# Patient Record
Sex: FEMALE | Race: WHITE | Employment: OTHER | URBAN - METROPOLITAN AREA
[De-identification: names, ages, dates, MRNs, and addresses within clinical notes are randomized per-mention and may not be internally consistent; named-entity substitution may affect disease eponyms.]

---

## 2017-02-23 ENCOUNTER — HOSPITAL ENCOUNTER (OUTPATIENT)
Dept: RADIATION ONCOLOGY | Age: 37
Discharge: HOME | End: 2017-02-23
Payer: COMMERCIAL

## 2017-02-23 PROCEDURE — 99211 OFF/OP EST MAY X REQ PHY/QHP: CPT | Performed by: OBSTETRICS & GYNECOLOGY

## 2017-07-13 ENCOUNTER — HOSPITAL ENCOUNTER (OUTPATIENT)
Dept: RADIATION ONCOLOGY | Age: 37
Discharge: HOME | End: 2017-07-13
Payer: COMMERCIAL

## 2017-07-13 PROCEDURE — 99211 OFF/OP EST MAY X REQ PHY/QHP: CPT | Performed by: OBSTETRICS & GYNECOLOGY

## 2018-05-10 ENCOUNTER — HOSPITAL ENCOUNTER (OUTPATIENT)
Dept: RADIATION ONCOLOGY | Age: 38
Discharge: HOME | End: 2018-05-10
Payer: COMMERCIAL

## 2018-05-10 PROCEDURE — 99211 OFF/OP EST MAY X REQ PHY/QHP: CPT | Performed by: OBSTETRICS & GYNECOLOGY

## 2019-05-24 ENCOUNTER — APPOINTMENT (OUTPATIENT)
Dept: GENERAL RADIOLOGY | Age: 39
End: 2019-05-24
Payer: COMMERCIAL

## 2019-05-24 ENCOUNTER — HOSPITAL ENCOUNTER (OUTPATIENT)
Age: 39
Setting detail: OBSERVATION
Discharge: HOME | End: 2019-05-26
Attending: EMERGENCY MEDICINE | Admitting: INTERNAL MEDICINE
Payer: COMMERCIAL

## 2019-05-24 DIAGNOSIS — J98.4 CAVITATING MASS IN RIGHT MIDDLE LUNG LOBE: Primary | ICD-10-CM

## 2019-05-24 PROCEDURE — 83605 ASSAY OF LACTIC ACID: CPT

## 2019-05-24 PROCEDURE — 71260 CT THORAX DX C+: CPT

## 2019-05-24 PROCEDURE — 80053 COMPREHEN METABOLIC PANEL: CPT

## 2019-05-24 PROCEDURE — 71046 X-RAY EXAM CHEST 2 VIEWS: CPT

## 2019-05-24 PROCEDURE — 36415 COLL VENOUS BLD VENIPUNCTURE: CPT

## 2019-05-24 PROCEDURE — 85025 COMPLETE CBC W/AUTO DIFF WBC: CPT

## 2019-05-24 PROCEDURE — 99285 EMERGENCY DEPT VISIT HI MDM: CPT

## 2019-05-24 RX ORDER — CITALOPRAM 10 MG/1
10 TABLET ORAL NIGHTLY
COMMUNITY

## 2019-05-24 SDOH — HEALTH STABILITY: MENTAL HEALTH: HOW OFTEN DO YOU HAVE A DRINK CONTAINING ALCOHOL?: NEVER

## 2019-05-24 ASSESSMENT — ENCOUNTER SYMPTOMS
EYE DISCHARGE: 0
WHEEZING: 0
BACK PAIN: 0
NAUSEA: 0
SHORTNESS OF BREATH: 1
CHEST TIGHTNESS: 0
COUGH: 1
PHOTOPHOBIA: 0
VOMITING: 0
ABDOMINAL PAIN: 0
ANAL BLEEDING: 0
SORE THROAT: 0
ABDOMINAL DISTENTION: 0
CONSTIPATION: 0

## 2019-05-24 ASSESSMENT — PAIN SCALES - GENERAL: PAINLEVEL_OUTOF10: 6

## 2019-05-24 ASSESSMENT — PAIN DESCRIPTION - ORIENTATION: ORIENTATION: LEFT;UPPER

## 2019-05-24 ASSESSMENT — PAIN DESCRIPTION - LOCATION: LOCATION: BACK

## 2019-05-24 ASSESSMENT — PAIN DESCRIPTION - PAIN TYPE: TYPE: ACUTE PAIN

## 2019-05-25 ENCOUNTER — APPOINTMENT (OUTPATIENT)
Dept: CT IMAGING | Age: 39
End: 2019-05-25
Payer: COMMERCIAL

## 2019-05-25 PROBLEM — R91.8 LUNG MASS: Status: ACTIVE | Noted: 2019-05-25

## 2019-05-25 PROBLEM — C78.00 METASTATIC CANCER TO LUNG (HCC): Status: ACTIVE | Noted: 2019-05-25

## 2019-05-25 PROBLEM — N93.9 ABNORMAL VAGINAL BLEEDING: Status: ACTIVE | Noted: 2019-05-25

## 2019-05-25 PROBLEM — R05.9 COUGH: Status: ACTIVE | Noted: 2019-05-25

## 2019-05-25 PROBLEM — Z90.710 H/O: HYSTERECTOMY: Chronic | Status: ACTIVE | Noted: 2019-05-25

## 2019-05-25 LAB
ALBUMIN SERPL-MCNC: 3.8 G/DL (ref 3.5–4.6)
ALP BLD-CCNC: 64 U/L (ref 40–130)
ALT SERPL-CCNC: 17 U/L (ref 0–33)
ANION GAP SERPL CALCULATED.3IONS-SCNC: 12 MEQ/L (ref 9–15)
ANION GAP SERPL CALCULATED.3IONS-SCNC: 14 MEQ/L (ref 9–15)
AST SERPL-CCNC: 16 U/L (ref 0–35)
BASOPHILS ABSOLUTE: 0.1 K/UL (ref 0–0.2)
BASOPHILS RELATIVE PERCENT: 0.5 %
BILIRUB SERPL-MCNC: 0.5 MG/DL (ref 0.2–0.7)
BUN BLDV-MCNC: 15 MG/DL (ref 6–20)
BUN BLDV-MCNC: 16 MG/DL (ref 6–20)
CA 125: 22.3 U/ML (ref 0–35)
CALCIUM SERPL-MCNC: 8.7 MG/DL (ref 8.5–9.9)
CALCIUM SERPL-MCNC: 9.3 MG/DL (ref 8.5–9.9)
CEA: 3.4 NG/ML (ref 0–5.5)
CHLORIDE BLD-SCNC: 100 MEQ/L (ref 95–107)
CHLORIDE BLD-SCNC: 98 MEQ/L (ref 95–107)
CO2: 24 MEQ/L (ref 20–31)
CO2: 26 MEQ/L (ref 20–31)
CREAT SERPL-MCNC: 0.67 MG/DL (ref 0.5–0.9)
CREAT SERPL-MCNC: 0.67 MG/DL (ref 0.5–0.9)
EOSINOPHILS ABSOLUTE: 0.2 K/UL (ref 0–0.7)
EOSINOPHILS RELATIVE PERCENT: 1.7 %
GFR AFRICAN AMERICAN: >60
GFR AFRICAN AMERICAN: >60
GFR NON-AFRICAN AMERICAN: >60
GFR NON-AFRICAN AMERICAN: >60
GLOBULIN: 3.7 G/DL (ref 2.3–3.5)
GLUCOSE BLD-MCNC: 142 MG/DL (ref 70–99)
GLUCOSE BLD-MCNC: 175 MG/DL (ref 70–99)
HCT VFR BLD CALC: 35.1 % (ref 37–47)
HCT VFR BLD CALC: 38 % (ref 37–47)
HEMOGLOBIN: 11.8 G/DL (ref 12–16)
HEMOGLOBIN: 12.9 G/DL (ref 12–16)
LACTIC ACID: 0.8 MMOL/L (ref 0.5–2.2)
LYMPHOCYTES ABSOLUTE: 1.6 K/UL (ref 1–4.8)
LYMPHOCYTES RELATIVE PERCENT: 14.9 %
MCH RBC QN AUTO: 27.7 PG (ref 27–31.3)
MCH RBC QN AUTO: 28.6 PG (ref 27–31.3)
MCHC RBC AUTO-ENTMCNC: 33.5 % (ref 33–37)
MCHC RBC AUTO-ENTMCNC: 34 % (ref 33–37)
MCV RBC AUTO: 82.7 FL (ref 82–100)
MCV RBC AUTO: 84 FL (ref 82–100)
MONOCYTES ABSOLUTE: 0.6 K/UL (ref 0.2–0.8)
MONOCYTES RELATIVE PERCENT: 5.6 %
NEUTROPHILS ABSOLUTE: 8.4 K/UL (ref 1.4–6.5)
NEUTROPHILS RELATIVE PERCENT: 77.3 %
PDW BLD-RTO: 15.7 % (ref 11.5–14.5)
PDW BLD-RTO: 15.9 % (ref 11.5–14.5)
PLATELET # BLD: 279 K/UL (ref 130–400)
PLATELET # BLD: 337 K/UL (ref 130–400)
POC CREATININE WHOLE BLOOD: 0.7
POTASSIUM REFLEX MAGNESIUM: 3.9 MEQ/L (ref 3.4–4.9)
POTASSIUM SERPL-SCNC: 3.9 MEQ/L (ref 3.4–4.9)
RBC # BLD: 4.25 M/UL (ref 4.2–5.4)
RBC # BLD: 4.53 M/UL (ref 4.2–5.4)
SODIUM BLD-SCNC: 136 MEQ/L (ref 135–144)
SODIUM BLD-SCNC: 138 MEQ/L (ref 135–144)
TOTAL PROTEIN: 7.5 G/DL (ref 6.3–8)
WBC # BLD: 10.8 K/UL (ref 4.8–10.8)
WBC # BLD: 9.4 K/UL (ref 4.8–10.8)

## 2019-05-25 PROCEDURE — 36415 COLL VENOUS BLD VENIPUNCTURE: CPT

## 2019-05-25 PROCEDURE — G0378 HOSPITAL OBSERVATION PER HR: HCPCS

## 2019-05-25 PROCEDURE — 99232 SBSQ HOSP IP/OBS MODERATE 35: CPT | Performed by: INTERNAL MEDICINE

## 2019-05-25 PROCEDURE — 96360 HYDRATION IV INFUSION INIT: CPT

## 2019-05-25 PROCEDURE — 6360000004 HC RX CONTRAST MEDICATION: Performed by: EMERGENCY MEDICINE

## 2019-05-25 PROCEDURE — 71260 CT THORAX DX C+: CPT

## 2019-05-25 PROCEDURE — 86304 IMMUNOASSAY TUMOR CA 125: CPT

## 2019-05-25 PROCEDURE — 2580000003 HC RX 258: Performed by: INTERNAL MEDICINE

## 2019-05-25 PROCEDURE — 2500000003 HC RX 250 WO HCPCS

## 2019-05-25 PROCEDURE — 80048 BASIC METABOLIC PNL TOTAL CA: CPT

## 2019-05-25 PROCEDURE — 87076 CULTURE ANAEROBE IDENT EACH: CPT

## 2019-05-25 PROCEDURE — 99222 1ST HOSP IP/OBS MODERATE 55: CPT | Performed by: INTERNAL MEDICINE

## 2019-05-25 PROCEDURE — 6370000000 HC RX 637 (ALT 250 FOR IP): Performed by: INTERNAL MEDICINE

## 2019-05-25 PROCEDURE — 85027 COMPLETE CBC AUTOMATED: CPT

## 2019-05-25 PROCEDURE — 87149 DNA/RNA DIRECT PROBE: CPT

## 2019-05-25 PROCEDURE — 6370000000 HC RX 637 (ALT 250 FOR IP): Performed by: HOSPITALIST

## 2019-05-25 PROCEDURE — 6370000000 HC RX 637 (ALT 250 FOR IP): Performed by: EMERGENCY MEDICINE

## 2019-05-25 PROCEDURE — 6360000004 HC RX CONTRAST MEDICATION: Performed by: HOSPITALIST

## 2019-05-25 PROCEDURE — 82378 CARCINOEMBRYONIC ANTIGEN: CPT

## 2019-05-25 PROCEDURE — 87040 BLOOD CULTURE FOR BACTERIA: CPT

## 2019-05-25 PROCEDURE — 74177 CT ABD & PELVIS W/CONTRAST: CPT

## 2019-05-25 RX ORDER — FAMOTIDINE 20 MG/1
20 TABLET, FILM COATED ORAL 2 TIMES DAILY
Status: DISCONTINUED | OUTPATIENT
Start: 2019-05-25 | End: 2019-05-26 | Stop reason: HOSPADM

## 2019-05-25 RX ORDER — SODIUM CHLORIDE 9 MG/ML
INJECTION, SOLUTION INTRAVENOUS CONTINUOUS
Status: DISCONTINUED | OUTPATIENT
Start: 2019-05-25 | End: 2019-05-25

## 2019-05-25 RX ORDER — LORAZEPAM 2 MG/ML
0.5 INJECTION INTRAMUSCULAR EVERY 6 HOURS PRN
Status: DISCONTINUED | OUTPATIENT
Start: 2019-05-25 | End: 2019-05-26 | Stop reason: HOSPADM

## 2019-05-25 RX ORDER — KETOROLAC TROMETHAMINE 30 MG/ML
30 INJECTION, SOLUTION INTRAMUSCULAR; INTRAVENOUS EVERY 6 HOURS PRN
Status: DISCONTINUED | OUTPATIENT
Start: 2019-05-25 | End: 2019-05-26 | Stop reason: HOSPADM

## 2019-05-25 RX ORDER — CODEINE PHOSPHATE AND GUAIFENESIN 10; 100 MG/5ML; MG/5ML
10 SOLUTION ORAL EVERY 4 HOURS PRN
Status: DISCONTINUED | OUTPATIENT
Start: 2019-05-25 | End: 2019-05-26 | Stop reason: HOSPADM

## 2019-05-25 RX ORDER — ONDANSETRON 2 MG/ML
4 INJECTION INTRAMUSCULAR; INTRAVENOUS EVERY 6 HOURS PRN
Status: DISCONTINUED | OUTPATIENT
Start: 2019-05-25 | End: 2019-05-26 | Stop reason: HOSPADM

## 2019-05-25 RX ORDER — SODIUM CHLORIDE 0.9 % (FLUSH) 0.9 %
10 SYRINGE (ML) INJECTION EVERY 12 HOURS SCHEDULED
Status: DISCONTINUED | OUTPATIENT
Start: 2019-05-25 | End: 2019-05-26 | Stop reason: HOSPADM

## 2019-05-25 RX ORDER — CITALOPRAM 10 MG/1
10 TABLET ORAL DAILY
Status: DISCONTINUED | OUTPATIENT
Start: 2019-05-25 | End: 2019-05-26 | Stop reason: HOSPADM

## 2019-05-25 RX ORDER — SODIUM CHLORIDE 0.9 % (FLUSH) 0.9 %
10 SYRINGE (ML) INJECTION EVERY 12 HOURS SCHEDULED
Status: DISCONTINUED | OUTPATIENT
Start: 2019-05-25 | End: 2019-05-25 | Stop reason: SDUPTHER

## 2019-05-25 RX ORDER — MAGNESIUM SULFATE 1 G/100ML
1 INJECTION INTRAVENOUS PRN
Status: DISCONTINUED | OUTPATIENT
Start: 2019-05-25 | End: 2019-05-26 | Stop reason: HOSPADM

## 2019-05-25 RX ORDER — TRAMADOL HYDROCHLORIDE 50 MG/1
50 TABLET ORAL EVERY 6 HOURS PRN
Status: DISCONTINUED | OUTPATIENT
Start: 2019-05-25 | End: 2019-05-26 | Stop reason: HOSPADM

## 2019-05-25 RX ORDER — POTASSIUM CHLORIDE 7.45 MG/ML
10 INJECTION INTRAVENOUS PRN
Status: DISCONTINUED | OUTPATIENT
Start: 2019-05-25 | End: 2019-05-26 | Stop reason: HOSPADM

## 2019-05-25 RX ORDER — POTASSIUM CHLORIDE 20 MEQ/1
40 TABLET, EXTENDED RELEASE ORAL PRN
Status: DISCONTINUED | OUTPATIENT
Start: 2019-05-25 | End: 2019-05-26 | Stop reason: HOSPADM

## 2019-05-25 RX ORDER — SENNOSIDES 8.8 MG/5ML
8.8 LIQUID ORAL DAILY PRN
Status: DISCONTINUED | OUTPATIENT
Start: 2019-05-25 | End: 2019-05-26 | Stop reason: HOSPADM

## 2019-05-25 RX ORDER — ONDANSETRON 2 MG/ML
4 INJECTION INTRAMUSCULAR; INTRAVENOUS EVERY 6 HOURS PRN
Status: DISCONTINUED | OUTPATIENT
Start: 2019-05-25 | End: 2019-05-25

## 2019-05-25 RX ORDER — SODIUM CHLORIDE 0.9 % (FLUSH) 0.9 %
10 SYRINGE (ML) INJECTION PRN
Status: DISCONTINUED | OUTPATIENT
Start: 2019-05-25 | End: 2019-05-25 | Stop reason: SDUPTHER

## 2019-05-25 RX ORDER — LORAZEPAM 1 MG/1
2 TABLET ORAL ONCE
Status: COMPLETED | OUTPATIENT
Start: 2019-05-25 | End: 2019-05-25

## 2019-05-25 RX ORDER — ACETAMINOPHEN 325 MG/1
650 TABLET ORAL EVERY 4 HOURS PRN
Status: DISCONTINUED | OUTPATIENT
Start: 2019-05-25 | End: 2019-05-26 | Stop reason: HOSPADM

## 2019-05-25 RX ORDER — SODIUM CHLORIDE 0.9 % (FLUSH) 0.9 %
10 SYRINGE (ML) INJECTION PRN
Status: DISCONTINUED | OUTPATIENT
Start: 2019-05-25 | End: 2019-05-26 | Stop reason: HOSPADM

## 2019-05-25 RX ORDER — POTASSIUM CHLORIDE 1.5 G/1.77G
40 POWDER, FOR SOLUTION ORAL PRN
Status: DISCONTINUED | OUTPATIENT
Start: 2019-05-25 | End: 2019-05-26 | Stop reason: HOSPADM

## 2019-05-25 RX ADMIN — IOPAMIDOL 75 ML: 755 INJECTION, SOLUTION INTRAVENOUS at 00:29

## 2019-05-25 RX ADMIN — CITALOPRAM HYDROBROMIDE 10 MG: 10 TABLET ORAL at 07:42

## 2019-05-25 RX ADMIN — Medication 10 ML: at 21:16

## 2019-05-25 RX ADMIN — GUAIFENESIN AND CODEINE PHOSPHATE 10 ML: 10; 100 LIQUID ORAL at 05:22

## 2019-05-25 RX ADMIN — FAMOTIDINE 20 MG: 20 TABLET ORAL at 07:42

## 2019-05-25 RX ADMIN — LORAZEPAM 2 MG: 1 TABLET ORAL at 02:53

## 2019-05-25 RX ADMIN — SODIUM CHLORIDE: 9 INJECTION, SOLUTION INTRAVENOUS at 04:14

## 2019-05-25 RX ADMIN — FAMOTIDINE 20 MG: 20 TABLET ORAL at 21:16

## 2019-05-25 RX ADMIN — BARIUM SULFATE 450 ML: 20 SUSPENSION ORAL at 11:21

## 2019-05-25 RX ADMIN — Medication 10 ML: at 07:43

## 2019-05-25 RX ADMIN — IOPAMIDOL 100 ML: 755 INJECTION, SOLUTION INTRAVENOUS at 12:20

## 2019-05-25 ASSESSMENT — PAIN SCALES - GENERAL: PAINLEVEL_OUTOF10: 6

## 2019-05-25 NOTE — H&P
[place] [address] MEDICINE    HISTORY AND PHYSICAL EXAM    PATIENT NAME:  Dina Masterson    MRN:  [id number]  SERVICE DATE:  5/25/2019   SERVICE TIME:  5:09 AM    Primary Care Physician: No primary care provider on file. SUBJECTIVE  CHIEF COMPLAINT:  Cough    HPI:  This is a 46 y.o. female who presents with significant PMH of endometrial cancer treated with chemo and radiation and then radical hysterectomy, unsure of cervical status; patient presented to the ER tonight with complaints of a cough that has been waxing and waning since December. Patient has tried over-the-counter medications that have not seemed to help much. Patient did see someone at [facility] and was diagnosed with a respiratory infection in December, her other symptoms one away such as congestion and shortness of breath but the cough has persisted. Patient admits to one episode of hemoptysis within the last 48 hours, cough is occasionally productive with yellow sputum. Patient also has occasional chest discomfort with deep inspiration. Patient denies night sweats or history of lung disease. Patient also admits to 35-40 pound weight loss within the last 3 months, patient did state she is active at work and has been eating better. Patient had endometrial cancer 4 years ago treated with chemo and radiation and then a radical hysterectomy, unsure if she still has a cervix. Patient also has a large family history of cancer. Patient also states she's been having episodes of intermittent abdominal cramping with vaginal bleeding, she attributed it to stress. Patient states she would've come in sooner for the cough but she works a lot and has been unable to get any time off. Patient is having some chest discomfort with cough but denies chest pain. Patient has no cardiac history.   Patient denies fevers, chills, myalgias, night sweats, chest pain, shortness of breath, palpitations, abdominal pain, nausea, vomiting, diarrhea, dysuria, numbness or tingling, dizziness or lightheadedness, vision changes.     PAST MEDICAL HISTORY:    Past Medical History:   Diagnosis Date    Endometrial cancer ([facility] [address]     chemo and radiation     PAST SURGICAL HISTORY:    Past Surgical History:   Procedure Laterality Date    HYSTERECTOMY  10/2015     FAMILY HISTORY:    Family History   Problem Relation Age of Onset    Cancer Mother         lymphoma    Diabetes Mother     No Known Problems Father     Diabetes Maternal Grandmother         questionable female cancer    Heart Disease Maternal Grandmother     Cancer Paternal Grandfather         stomach     SOCIAL HISTORY:    Social History     Socioeconomic History    Marital status: Single     Spouse name: Not on file    Number of children: Not on file    Years of education: Not on file    Highest education level: Not on file   Occupational History    Not on file   Social Needs    Financial resource strain: Not on file    Food insecurity:     Worry: Not on file     Inability: Not on file    Transportation needs:     Medical: Not on file     Non-medical: Not on file   Tobacco Use    Smoking status: Former Smoker     Types: Cigarettes     Last attempt to quit:      Years since quittin.3    Smokeless tobacco: Never Used   Substance and Sexual Activity    Alcohol use: Never     Frequency: Never    Drug use: Never    Sexual activity: Not on file   Lifestyle    Physical activity:     Days per week: Not on file     Minutes per session: Not on file    Stress: Not on file   Relationships    Social connections:     Talks on phone: Not on file     Gets together: Not on file     Attends Jewish service: Not on file     Active member of club or organization: Not on file     Attends meetings of clubs or organizations: Not on file     Relationship status: Not on file    Intimate partner violence:     Fear of current or ex partner: Not on file     Emotionally abused: Not on file     Physically abused: Not on file     Forced sexual activity: Not on file   Other Topics Concern    Not on file   Social History Narrative    Not on file     MEDICATIONS:   Prior to Admission medications    Medication Sig Start Date End Date Taking? Authorizing Provider   citalopram (CELEXA) 10 MG tablet Take 10 mg by mouth daily   Yes Historical Provider, MD       ALLERGIES: Pcn [penicillins] and Sulfa antibiotics    REVIEW OF SYSTEM:   ROS as noted in HPI, 12 point ROS reviewed and otherwise negative. OBJECTIVE  PHYSICAL EXAM: BP (!) 143/80   Pulse 111   Temp 98.4 °F (36.9 °C) (Oral)   Resp 16   Ht 5' 11\" (1.803 m)   Wt (!) 315 lb (142.9 kg)   SpO2 94%   BMI 43.93 kg/m²     CONSTITUTIONAL:  awake, alert, cooperative, moderately obese and tearful  HEMATOLOGIC/LYMPHATICS:  no cervical lymphadenopathy and no supraclavicular lymphadenopathy  LUNGS:  no increased work of breathing, good air exchange and diminished breath sounds right base and right middle lobe  CARDIOVASCULAR:  normal apical pulses, regular rate and rhythm, normal S1 and S2 and no edema  ABDOMEN: normal bowel sounds, soft, non-distended, non-tender, no masses palpated, no hepatosplenomegally  MUSCULOSKELETAL:  there is no redness, warmth, or swelling of the joints  motor strength is 5 out of 5 all extremities bilaterally  tone is normal  NEUROLOGIC:  Awake, alert, oriented to name, place and time. Cranial nerves II-XII are grossly intact. SKIN:  no bruising or bleeding, no rashes and no lesions    DATA:     Diagnostic tests reviewed for today's visit:    Most recent labs and imaging results reviewed.      LABS:    Recent Results (from the past 24 hour(s))   Comprehensive Metabolic Panel    Collection Time: 05/24/19 11:45 PM   Result Value Ref Range    Sodium 138 135 - 144 mEq/L    Potassium 3.9 3.4 - 4.9 mEq/L    Chloride 98 95 - 107 mEq/L    CO2 26 20 - 31 mEq/L    Anion Gap 14 9 - 15 mEq/L    Glucose 142 (H) 70 - 99 mg/dL    BUN 16 6 - 20 mg/dL CREATININE 0.67 0.50 - 0.90 mg/dL    GFR Non-African American >60.0 >60    GFR  >60.0 >60    Calcium 9.3 8.5 - 9.9 mg/dL    Total Protein 7.5 6.3 - 8.0 g/dL    Alb 3.8 3.5 - 4.6 g/dL    Total Bilirubin 0.5 0.2 - 0.7 mg/dL    Alkaline Phosphatase 64 40 - 130 U/L    ALT 17 0 - 33 U/L    AST 16 0 - 35 U/L    Globulin 3.7 (H) 2.3 - 3.5 g/dL   CBC Auto Differential    Collection Time: 05/24/19 11:45 PM   Result Value Ref Range    WBC 10.8 4.8 - 10.8 K/uL    RBC 4.53 4.20 - 5.40 M/uL    Hemoglobin 12.9 12.0 - 16.0 g/dL    Hematocrit 38.0 37.0 - 47.0 %    MCV 84.0 82.0 - 100.0 fL    MCH 28.6 27.0 - 31.3 pg    MCHC 34.0 33.0 - 37.0 %    RDW 15.9 (H) 11.5 - 14.5 %    Platelets 013 340 - 475 K/uL    Neutrophils % 77.3 %    Lymphocytes % 14.9 %    Monocytes % 5.6 %    Eosinophils % 1.7 %    Basophils % 0.5 %    Neutrophils # 8.4 (H) 1.4 - 6.5 K/uL    Lymphocytes # 1.6 1.0 - 4.8 K/uL    Monocytes # 0.6 0.2 - 0.8 K/uL    Eosinophils # 0.2 0.0 - 0.7 K/uL    Basophils # 0.1 0.0 - 0.2 K/uL   Lactic Acid, Plasma    Collection Time: 05/24/19 11:45 PM   Result Value Ref Range    Lactic Acid 0.8 0.5 - 2.2 mmol/L   POCT Creatinine    Collection Time: 05/25/19 12:17 AM   Result Value Ref Range    POC CREATININE WHOLE BLOOD 0.7        IMAGING:  No results found.     VTE Prophylaxis: unfractionated heparin -  start    [address]  Patient Active Hospital Problem List:   Lung mass (5/25/2019)    Assessment: Chest x-ray shows significant lung mass, history of endometrial cancer, significant family history of cancer, persistent cough since December     Plan:   Admit for observation  Consult interventional radiology for bronchoscopy or biopsy  Consults oncology  Consult pulmonology  Check CEA and       Cough (5/25/2019)    Assessment: Persistent, occasionally productive, chest discomfort with cough, Mucinex and benzonatate ineffective     Plan:   Robitussin with codeine      Abnormal vaginal bleeding (5/25/2019)

## 2019-05-25 NOTE — ED TRIAGE NOTES
Patient c/o cough for 3 months with yellow colored sputum, this morning she was coughing really hard and has had pain in left upper back with inspiraTION EVER SINCE.

## 2019-05-25 NOTE — ED NOTES
This RN spoke to Teodoro Leary on 4W; he stated he will send a tech to take pt to floor.      Armando Mittal RN  05/25/19 1211

## 2019-05-25 NOTE — ED NOTES
CT called by this RN to notify them of pt's POC creatnine.      Carlitos Suresh, OLLIE  05/25/19 5525

## 2019-05-25 NOTE — CONSULTS
Inpatient consult to Pulmonology  Consult performed by: Marvin Estrada MD  Consult ordered by: THONG Cho - CNP        Pulmonary Medicine  Consult Note      Reason for consultation: Lung masses          HISTORY OF PRESENT ILLNESS:    This is a 20-year-old white female with known history of endometrial cancer status post chemo, radiation, and hysterectomy back in 2015. Patient had been followed by Dr. Joao Caballero, initially but not recently according to her. Patient has been having persistent dry cough for several months but has not had time to check it out due to her heavy work schedule!!  Patient reported mild increase in her shortness breath on exertion as well. Ultimately she came to the emergency room where chest x-ray showed large bilateral masses, confirmed by CT of the chest, patient is also scheduled for CT of the abdomen. Past Medical History:        Diagnosis Date    Endometrial cancer ([facility] [address] 2015    chemo and radiation       Past Surgical History:        Procedure Laterality Date    HYSTERECTOMY  10/2015       Social History:     reports that she quit smoking about 4 years ago. Her smoking use included cigarettes. She has never used smokeless tobacco. She reports that she does not drink alcohol or use drugs.     Family History:       Problem Relation Age of Onset    Cancer Mother         lymphoma    Diabetes Mother     No Known Problems Father     Diabetes Maternal Grandmother         questionable female cancer    Heart Disease Maternal Grandmother     Cancer Paternal Grandfather         stomach       Allergies:  Pcn [penicillins] and Sulfa antibiotics        MEDICATIONS during current hospitalization:    Continuous Infusions:    Scheduled Meds:   sodium chloride flush  10 mL Intravenous 2 times per day    citalopram  10 mg Oral Daily    famotidine  20 mg Oral BID       PRN Meds:sodium chloride flush, potassium chloride **OR** potassium alternative oral replacement **OR** potassium chloride, potassium chloride, magnesium sulfate, senna, LORazepam, magnesium hydroxide, ondansetron, guaiFENesin-codeine, traMADol, ketorolac, acetaminophen, barium sulfate        REVIEW OF SYSTEMS:  As in history of present illness  Other 14 point review of system is negative. PHYSICAL EXAM:    Vitals:  /60   Pulse 98   Temp 98.2 °F (36.8 °C) (Oral)   Resp 18   Ht 5' 11\" (1.803 m)   Wt (!) 302 lb (137 kg)   SpO2 92%   BMI 42.12 kg/m²   General: Patient is  Alert, awake . comfortable in bed, No distress. Head: Atraumatic , Normocephalic   Eyes: PERRL. No icteric sclera. No conjunctival injection. No discharge   ENT: No nasal  discharge. Pharynx clear. Neck:  Trachea midline. No thyromegaly, no JVD, No cervical adenopathy. Chest : Adequate effort, symmetric bilateral excursions  Lung : Diminished breath sounds bilaterally  Heart[de-identified] Regular rhythm and rate. No mumur ,  Rub or gallop  ABD: Benign. Non-tender. Non-distended. No masses or organmegaly. Normal bowel sounds. EXT: No significant Pitting edema both lower extremities , No Cyanosis No clubbing  Neuro: no focal weakness  Skin: Warm and dry. No erythema or rash on exposed extremities. .      Data Review  Recent Labs     05/24/19 2345 05/25/19  0719   WBC 10.8 9.4   HGB 12.9 11.8*   HCT 38.0 35.1*    279      Recent Labs     05/24/19  2345 05/25/19  0719    136   K 3.9 3.9   CL 98 100   CO2 26 24   BUN 16 15   CREATININE 0.67 0.67   GLUCOSE 142* 175*       MV Settings: ABGs: No results for input(s): PHART, AYQ6JEG, PO2ART, SAQ9PFS, BEART, V1HQTVZQ, LHF9EKQ in the last 72 hours. O2 Device: None (Room air)  Lab Results   Component Value Date    LACTA 0.8 05/24/2019       Radiology  Xr Chest Standard (2 Vw)    Result Date: 5/25/2019  XR CHEST (2 VW) : 5/24/2019 CLINICAL HISTORY:  COUGH, PAIN . COMPARISON: Chest CT 3/17/2016. TECHNIQUE: Upright PA and lateral radiographs of the chest were obtained.  FINDINGS: Since the previous CT, large masses within the mid to lower lung fields have developed bilaterally, measuring up to 12.5 cm of the mid to inferior right upper lobe. Further evaluation with CT is suggested. There is no significant cardiomegaly, pleural effusion, vascular congestion, pneumothorax, or acute complication identified. LARGE PULMONARY MASSES HAVE DEVELOPED SINCE THE PREVIOUS CT. A FOLLOW-UP CT OF THE CHEST IS SUGGESTED. Ct Chest W Contrast    Result Date: 5/25/2019  EXAMINATION:  CT CHEST WITH IV CONTRAST CLINICAL HISTORY:  METASTATIC UTERINE CARCINOMA, PATIENT WITH INSPIRATORY CHEST PAIN AND PRODUCTIVE COUGH, ABNORMAL CHEST X-RAY COMPARISON:  3/17/2016 TECHNIQUE:  CT chest is obtained following IV injection of 75 mL of Isovue-370. Axial and MPR CT images of the chest are obtained. CT images are viewed with pulmonary and mediastinal window settings. FINDINGS:  The CT chest with IV contrast demonstrates the following: Pulmonary: There are bilateral large inhomogeneously hypodense pulmonary masses. The largest tumor mass is found in the RUL but there are additional relatively large tumor masses in both lower lobes. The right upper lobe mass/consolidation demonstrates central cavitation which may very well represent tumor necrosis. In view of the patient's history of uterine carcinoma, the bilateral pulmonary mass are most likely a manifestation of extensive metastatic disease to both lungs. There are no pleural effusions. There is no pneumothorax. Heart and mediastinum: The heart is not enlarged and there is no pericardial effusion. There is mediastinal and bilateral hilar adenopathy which likely represent metastatic adenopathy. There is no axillary adenopathy and there is no supraclavicular adenopathy. Thoracic aorta and great vessels appear unremarkable. Upper abdomen unremarkable. Osseous structures: Mild degenerative bone spurring in the thoracic spine. No thoracic spine fracture or malalignment. Sternum and ribs appear intact. No osteolytic or osteoblastic metastases visible in the bony thorax. 1. BILATERALLY PULMONARY MASSES COMPATIBLE WITH EXTENSIVE METASTATIC DISEASE TO THE LUNGS. 2. MEDIASTINAL AND BILATERAL HILAR ADENOPATHY LIKELY REPRESENTS METASTATIC ADENOPATHY. All CT scans at this facility use dose modulation, iterative reconstruction, and/or weight based dosing when appropriate to reduce radiation dose to as low as reasonably achievable. Ct Abdomen Pelvis W Iv Contrast Additional Contrast? Radiologist Recommendation    Result Date: 5/25/2019  CT ABDOMEN PELVIS W IV CONTRAST: 5/25/2019 CLINICAL HISTORY:  ENDOMETRIAL CANCER, ASSESS CERVICAL INVOLVEMENT . COMPARISON: 3/17/2016 and chest CT from earlier 5/25/2019. TECHNIQUE: Spiral images were obtained of the abdomen and pelvis neck Isovue. All CT scans at this facility use dose modulation, iterative reconstruction, and/or weight based dosing when appropriate to reduce radiation dose to as low as reasonably achievable. FINDINGS: Masses within the inferior hemithoraces appear unchanged from the earlier chest CTA. There has been previous hysterectomy. There are no abnormal masses, lymphadenopathy, ascites, or other significant changes in the abdomen and pelvis from 3/17/2016 identified. A moderate to markedly enlarged fatty liver appears unchanged. The spleen remains mildly enlarged. A small calcified gallstone within an otherwise normal-appearing gallstone is noted. The pancreas, adrenal glands, kidneys, great vessels, bowel loops, nearly decompressed urinary bladder, and additional images of pelvis are unremarkable. NO EVIDENCE OF RECURRENT PELVIC MASSES, OR OTHER SIGNIFICANT CHANGES IN THE ABDOMEN AND PELVIS FROM 3/17/2016 IDENTIFIED. ENLARGED FATTY LIVER. CHOLELITHIASIS. Assessment, plan:   1.  Bilateral very large lung masses likely metastatic uterine cancer  Discussed findings and plans for biopsies with the patient today at length.   Plan on proceeding with fiberoptic bronchoscopy early next week as an outpatient, awaiting oncology's opinion and further testing but otherwise patient can be discharged home from my standpoint to be evaluated as an outpatient        Electronically signed by Caterina Tilley MD, FCCP on 5/25/2019 at 3:25 PM

## 2019-05-25 NOTE — PROGRESS NOTES
Nutrition Assessment    Type and Reason for Visit: Initial, Positive Nutrition Screen(+ malnutruition screen)    Nutrition Recommendations:   Continue with General diet   Tolerating po > 75% , ONS not indicated at this time    Nutrition Assessment: Pt at high nutrition risk related to reprots of significant weight loss PTA, suspicion for metastatic lung Ca. Tolerated > 75% of breakfast today, not interested in ONS at this time    Malnutrition Assessment:  · Malnutrition Status: At risk for malnutrition  · Context: Acute illness or injury  · Findings of the 6 clinical characteristics of malnutrition (Minimum of 2 out of 6 clinical characteristics is required to make the diagnosis of moderate or severe Protein Calorie Malnutrition based on AND/ASPEN Guidelines):  1. Energy Intake-Greater than 75% of estimated energy requirement, Greater than or equal to 5 days    2. Weight Loss-10% loss or greater, in 6 months  3. Fat Loss-No significant subcutaneous fat loss,    4. Muscle Loss-No significant muscle mass loss,    5. Fluid Accumulation-No significant fluid accumulation,    6.  Strength-Not measured    Nutrition Risk Level:  Moderate    Nutrient Needs:  · Estimated Daily Total Kcal: 6849-1066 kcals ( 13-15 kcal/kg)  · Estimated Daily Protein (g): ~140 g ( 2.0 g/kg IBW)  · Estimated Daily Total Fluid (ml/day): ~2100 ( 30 ml/kg IBW)    Nutrition Diagnosis:   · Problem: Unintended weight loss  · Etiology: related to Catabolic illness     Signs and symptoms:  as evidenced by Weight loss greater than or equal to 10% in 6 months    Objective Information:  · Nutrition-Focused Physical Findings: trace non-pitting edema, Hx of Endometrial Ca ( 2015)  · Wound Type: None  · Current Nutrition Therapies:  · Oral Diet Orders: General   · Oral Diet intake: %  · Oral Nutrition Supplement (ONS) Orders: None  · OAnthropometric Measures:  · Ht: 5' 11\" (180.3 cm)   · Current Body Wt: 302 lb (137 kg)  · Admission Body Wt: 315 lb (142.9 kg)(stated)  · Usual Body Wt: 345 lb (156.5 kg)(per pt, No EMR weights available)  · % Weight Change:  ,  ~10% (35# per pt report) ~ 6 monhs ( estimate)  · Ideal Body Wt: 155 lb (70.3 kg), % Ideal Body > 100%  · BMI Classification: BMI > or equal to 40.0 Obese Class III    Nutrition Interventions:   Continue current diet  Continued Inpatient Monitoring, Education Not Indicated    Nutrition Evaluation:   · Evaluation: Goals set   · Goals: po > 75%    · Monitoring: Meal Intake, Weight      Electronically signed by Satya Evangelista RD, LD on 5/25/19 at 10:49 AM

## 2019-05-25 NOTE — PROGRESS NOTES
 barium sulfate (READI-CAT 2) 2 % suspension 450 mL  450 mL Oral ONCE PRN Katherin Hopper, APRN - CNP         Allergies   Allergen Reactions    Pcn [Penicillins]     Sulfa Antibiotics      Principal Problem:    Lung mass  Active Problems:    Cough    Abnormal vaginal bleeding  Resolved Problems:    * No resolved hospital problems. *    Blood pressure 120/60, pulse 98, temperature 98.2 °F (36.8 °C), temperature source Oral, resp. rate 18, height 5' 11\" (1.803 m), weight (!) 302 lb (137 kg), SpO2 92 %. Subjective:  Symptoms:  Stable. She reports shortness of breath, malaise, cough, chest pain and weakness. Diet:  Adequate intake. Objective:  General Appearance:  Ill-appearing and in no acute distress. Vital signs: (most recent): Blood pressure 115/60, pulse 96, temperature 97.9 °F (36.6 °C), temperature source Oral, resp. rate 18, height 5' 11\" (1.803 m), weight (!) 302 lb (137 kg), SpO2 91 %. Vital signs are normal.    Output: Producing urine and producing stool. Lungs:  Normal effort and normal respiratory rate. Breath sounds clear to auscultation. She is not in respiratory distress. No decreased breath sounds. Heart: Normal rate. Regular rhythm. S1 normal and S2 normal.    Abdomen: Abdomen is soft. Bowel sounds are normal.   There is no abdominal tenderness. Extremities: Normal range of motion. Pulses: Distal pulses are intact.       Assessment & Plan   Shortness of breath/Lung mass   History of endometrial cancer  pulm and oncology evaluation      Juan M Samano MD  5/25/2019

## 2019-05-25 NOTE — ED NOTES
pATIENT AMBULATORY TO XRAY, GAVE HER GOWN TO CHANGE IN RADIOLOGY CHANGING AREA, PATIENT AWARE SHE WILL RETURN TO LOBBY TO WAIT FOR A ROOM.      Kg Sanders RN  05/24/19 7170

## 2019-05-25 NOTE — PROGRESS NOTES
47 yo F with PMH Endometriosis s/p hysterectomy here with productive cough now with bilat lung masses

## 2019-05-25 NOTE — ED PROVIDER NOTES
[address] [facility] ED  eMERGENCY dEPARTMENT eNCOUnter      Pt Name: Deshaun Chaudhry  MRN: [id number]  [place] 1980  Date of evaluation: 5/24/2019  Provider: Raji Orellana MD    CHIEF COMPLAINT       Chief Complaint   Patient presents with    Cough     pain with breathing         HISTORY OF PRESENT ILLNESS   (Location/Symptom, Timing/Onset,Context/Setting, Quality, Duration, Modifying Factors, Severity)  Note limiting factors. Deshaun Chaudhry is a 46 y.o. female who presents to the emergency department. For evaluation of cough. Patient's had a 3 month history of cough now productive of a yellow green phlegm. She did have one episode of hemoptysis. Patient states it's hurts sometimes take a deep breath. Of significance, the patient has a remote history of endometrial cancer that supposed to be in complete remission. That was treated 4 years ago. She is a former smoker that quit 4 years ago. She's had no recent night sweats or weight loss. Nothing makes symptoms better or worse besides increased pain with deep breath. Current pain level is minimal.     HPI    NursingNotes were reviewed. REVIEW OF SYSTEMS    (2-9 systems for level 4, 10 or more for level 5)     Review of Systems   Constitutional: Negative for chills and diaphoresis. HENT: Negative for congestion, ear pain, mouth sores and sore throat. Eyes: Negative for photophobia and discharge. Respiratory: Positive for cough and shortness of breath. Negative for chest tightness and wheezing. Cardiovascular: Negative for chest pain and palpitations. Gastrointestinal: Negative for abdominal distention, abdominal pain, anal bleeding, constipation, nausea and vomiting. Endocrine: Negative for cold intolerance. Genitourinary: Negative for decreased urine volume, difficulty urinating, dysuria, flank pain, urgency and vaginal bleeding.    Musculoskeletal: Negative for arthralgias, back pain, joint swelling, myalgias and neck Emotionally abused: None     Physically abused: None     Forced sexual activity: None   Other Topics Concern    None   Social History Narrative    None       SCREENINGS      @FLOW([id number])@      PHYSICAL EXAM    (up to 7 for level 4, 8 or more for level 5)     ED Triage Vitals [05/24/19 2222]   BP Temp Temp Source Pulse Resp SpO2 Height Weight   130/64 98.5 °F (36.9 °C) Oral 107 18 96 % 5' 11\" (1.803 m) (!) 315 lb (142.9 kg)       Physical Exam   Constitutional: She is oriented to person, place, and time. She appears well-developed. HENT:   Head: Normocephalic. Nose: Nose normal.   Eyes: Pupils are equal, round, and reactive to light. Conjunctivae are normal.   Neck: Normal range of motion. Neck supple. Cardiovascular: Normal rate, regular rhythm, normal heart sounds and intact distal pulses. Pulmonary/Chest: Effort normal and breath sounds normal.   Abdominal: Soft. Bowel sounds are normal. She exhibits no mass. There is no tenderness. There is no guarding. Musculoskeletal: Normal range of motion. Neurological: She is alert and oriented to person, place, and time. Skin: Skin is warm and dry. Capillary refill takes less than 2 seconds. Psychiatric: She has a normal mood and affect. Thought content normal.   Nursing note and vitals reviewed. DIAGNOSTIC RESULTS     EKG: All EKG's are interpreted by the Emergency Department Physician who either signs or Co-signsthis chart in the absence of a cardiologist.        RADIOLOGY:   Gregary Tyree such as CT, Ultrasound and MRI are read by the radiologist. Gabriella Goodrich radiographic images are visualized and preliminarily interpreted by the emergency physician with the below findings:    Initial chest x-ray is concerning for lung mass with middle lobe effusion. She has a right middle lobe effusion along with lung masses in both lungs.     Interpretation per the Radiologist below, if available at the time ofthis note:    XR CHEST STANDARD (2 VW) (Results Pending)   CT CHEST W CONTRAST    (Results Pending)         ED BEDSIDE ULTRASOUND:   Performed by ED Physician - none    LABS:  Labs Reviewed   COMPREHENSIVE METABOLIC PANEL - Abnormal; Notable for the following components:       Result Value    Glucose 142 (*)     Globulin 3.7 (*)     All other components within normal limits   CBC WITH AUTO DIFFERENTIAL - Abnormal; Notable for the following components:    RDW 15.9 (*)     Neutrophils # 8.4 (*)     All other components within normal limits   POCT CREATININE - Normal   CULTURE BLOOD #2   CULTURE BLOOD #1   LACTIC ACID, PLASMA       All other labs were within normal range or not returned as of this dictation. EMERGENCY DEPARTMENT COURSE and DIFFERENTIAL DIAGNOSIS/MDM:   Vitals:    Vitals:    05/24/19 2222 05/25/19 0030   BP: 130/64 127/77   Pulse: 107 108   Resp: 18 16   Temp: 98.5 °F (36.9 °C)    TempSrc: Oral    SpO2: 96% 96%   Weight: (!) 315 lb (142.9 kg)    Height: 5' 11\" (1.803 m)         MDM large bilateral lateral lung masses on CT scan and x-ray. Based on the patient's history of remote endometrial cancer this is likely metastatic disease. CONSULTS:  None    PROCEDURES:  Unless otherwise noted below, none     Procedures    FINAL IMPRESSION      1. Cavitating mass in right middle lung lobe          DISPOSITION/PLAN   DISPOSITION Decision To Admit 05/25/2019 01:55:14 AM      PATIENT REFERRED TO:  No follow-up provider specified.     DISCHARGE MEDICATIONS:  New Prescriptions    No medications on file          (Please note that portions of this note were completed with a voice recognition program.  Efforts were made to edit the dictations but occasionally words are mis-transcribed.)    Grace Olson MD (electronically signed)  Attending Emergency Physician          Grace Olson MD  05/25/19 9972

## 2019-05-25 NOTE — PLAN OF CARE
Nutrition Problem: Unintended weight loss  Intervention: Food and/or Nutrient Delivery: Continue current diet  Nutritional Goals: po > 75%

## 2019-05-25 NOTE — PROGRESS NOTES
45 yo F with PMH Endometriosis s/p hysterectomy here with productive cough now with bilat lung masses

## 2019-05-26 VITALS
DIASTOLIC BLOOD PRESSURE: 67 MMHG | HEIGHT: 71 IN | RESPIRATION RATE: 18 BRPM | WEIGHT: 293 LBS | TEMPERATURE: 98.6 F | OXYGEN SATURATION: 91 % | HEART RATE: 91 BPM | SYSTOLIC BLOOD PRESSURE: 116 MMHG | BODY MASS INDEX: 41.02 KG/M2

## 2019-05-26 LAB
ANION GAP SERPL CALCULATED.3IONS-SCNC: 12 MEQ/L (ref 9–15)
BASOPHILS ABSOLUTE: 0.1 K/UL (ref 0–0.2)
BASOPHILS RELATIVE PERCENT: 1 %
BUN BLDV-MCNC: 13 MG/DL (ref 6–20)
CALCIUM SERPL-MCNC: 8.9 MG/DL (ref 8.5–9.9)
CHLORIDE BLD-SCNC: 99 MEQ/L (ref 95–107)
CO2: 26 MEQ/L (ref 20–31)
CREAT SERPL-MCNC: 0.65 MG/DL (ref 0.5–0.9)
EOSINOPHILS ABSOLUTE: 0.2 K/UL (ref 0–0.7)
EOSINOPHILS RELATIVE PERCENT: 2.2 %
GFR AFRICAN AMERICAN: >60
GFR NON-AFRICAN AMERICAN: >60
GLUCOSE BLD-MCNC: 141 MG/DL (ref 70–99)
HCT VFR BLD CALC: 36.4 % (ref 37–47)
HEMOGLOBIN: 12.5 G/DL (ref 12–16)
LYMPHOCYTES ABSOLUTE: 1.1 K/UL (ref 1–4.8)
LYMPHOCYTES RELATIVE PERCENT: 12.7 %
MCH RBC QN AUTO: 28.6 PG (ref 27–31.3)
MCHC RBC AUTO-ENTMCNC: 34.2 % (ref 33–37)
MCV RBC AUTO: 83.5 FL (ref 82–100)
MONOCYTES ABSOLUTE: 0.5 K/UL (ref 0.2–0.8)
MONOCYTES RELATIVE PERCENT: 5.2 %
NEUTROPHILS ABSOLUTE: 7 K/UL (ref 1.4–6.5)
NEUTROPHILS RELATIVE PERCENT: 78.9 %
PDW BLD-RTO: 15.8 % (ref 11.5–14.5)
PLATELET # BLD: 305 K/UL (ref 130–400)
POTASSIUM REFLEX MAGNESIUM: 4.4 MEQ/L (ref 3.4–4.9)
POTASSIUM SERPL-SCNC: 4.4 MEQ/L (ref 3.4–4.9)
RBC # BLD: 4.36 M/UL (ref 4.2–5.4)
SODIUM BLD-SCNC: 137 MEQ/L (ref 135–144)
WBC # BLD: 8.8 K/UL (ref 4.8–10.8)

## 2019-05-26 PROCEDURE — 85025 COMPLETE CBC W/AUTO DIFF WBC: CPT

## 2019-05-26 PROCEDURE — 80048 BASIC METABOLIC PNL TOTAL CA: CPT

## 2019-05-26 PROCEDURE — G0378 HOSPITAL OBSERVATION PER HR: HCPCS

## 2019-05-26 PROCEDURE — 6370000000 HC RX 637 (ALT 250 FOR IP): Performed by: HOSPITALIST

## 2019-05-26 PROCEDURE — 36415 COLL VENOUS BLD VENIPUNCTURE: CPT

## 2019-05-26 RX ADMIN — FAMOTIDINE 20 MG: 20 TABLET ORAL at 08:21

## 2019-05-26 ASSESSMENT — ENCOUNTER SYMPTOMS
SHORTNESS OF BREATH: 1
COUGH: 1

## 2019-05-26 ASSESSMENT — PAIN SCALES - GENERAL: PAINLEVEL_OUTOF10: 0

## 2019-05-26 NOTE — CONSULTS
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     Patient Active Problem List   Diagnosis    Lung mass    H/O: hysterectomy    Cough    Abnormal vaginal bleeding     Past Medical History:   Diagnosis Date    Endometrial cancer ([facility] [address]     chemo and radiation         Hx of febrile seizure as an infant (at 3 1/3 yo)  Past Surgical History:   Procedure Laterality Date    HYSTERECTOMY  10/2015     OB/GYN Hx;                         Menarche at age 13    Family History   Problem Relation Age of Onset    Cancer Mother         lymphoma    Diabetes Mother     No Known Problems Father     Diabetes Maternal Grandmother         questionable female cancer    Heart Disease Maternal Grandmother     Cancer Paternal Grandfather         stomach     Social History     Socioeconomic History    Marital status: Single     Spouse name: Not on file    Number of children: Not on file    Years of education: Not on file    Highest education level: Not on file   Occupational History    Not on file   Social Needs    Financial resource strain: Not on file    Food insecurity:     Worry: Not on file     Inability: Not on file    Transportation needs:     Medical: Not on file     Non-medical: Not on file   Tobacco Use    Smoking status: Former Smoker     Types: Cigarettes     Last attempt to quit:      Years since quittin.3    Smokeless tobacco: Never Used   Substance and Sexual Activity    Alcohol use: Never     Frequency: Never    Drug use: Never    Sexual activity: Not on file   Lifestyle    Physical activity:     Days per week: Not on file     Minutes per session: Not on file    Stress: Not on file   Relationships    Social connections:     Talks on phone: Not on file     Gets together: Not on file     Attends Yazidism service: Not on file     Active member of club or organization: Not on file     Attends meetings of clubs or organizations: Not on file     Relationship status: Not on file    Intimate partner violence:     Fear of current or ex partner: Not on file     Emotionally abused: Not on file     Physically abused: Not on file     Forced sexual activity: Not on file   Other Topics Concern    Not on file   Social History Narrative    Not on file     Pt smoked cigarettes  1/2 ppd x 19 yrs but quit in 6/2015.     Pt has Hx of taking  occ social alcoholic drink    Current Facility-Administered Medications   Medication Dose Route Frequency Provider Last Rate Last Dose    sodium chloride flush 0.9 % injection 10 mL  10 mL Intravenous 2 times per day Donna Manning MD   10 mL at 05/25/19 0743    sodium chloride flush 0.9 % injection 10 mL  10 mL Intravenous PRN Donna Manning MD        potassium chloride (KLOR-CON M) extended release tablet 40 mEq  40 mEq Oral PRN Donna Manning MD        Or    potassium chloride (KLOR-CON) packet 40 mEq  40 mEq Oral PRN Donna Manning MD        Or    potassium chloride 10 mEq/100 mL IVPB (Peripheral Line)  10 mEq Intravenous PRN Donna Manning MD        potassium chloride 10 mEq/100 mL IVPB (Peripheral Line)  10 mEq Intravenous PRN Donna Manning MD        magnesium sulfate 1 g in dextrose 5% 100 mL IVPB  1 g Intravenous PRN Donna Manning MD        senna (SENOKOT) 8.8 MG/5ML syrup 8.8 mg  8.8 mg Oral Daily PRN Donna Manning MD        citalopram (CELEXA) tablet 10 mg  10 mg Oral Daily Lu Ramos MD   10 mg at 05/25/19 0742    LORazepam (ATIVAN) injection 0.5 mg  0.5 mg Intravenous Q6H PRN Donna Manning MD        magnesium hydroxide (MILK OF MAGNESIA) 400 MG/5ML suspension 30 mL  30 mL Oral Daily PRN Thanh Li APRN - CNP        ondansetron (ZOFRAN) injection 4 mg  4 mg Intravenous Q6H PRN Thanh Li, APRN - CNP        guaiFENesin-codeine (GUAIFENESIN AC) 100-10 MG/5ML liquid 10 mL  10 mL Oral Q4H PRN Monjorden Li APRN - CNP   10 mL at 05/25/19 0522    traMADol (ULTRAM) tablet 50 mg  50 mg Oral Q6H PRN Terrea Roberto Carlos, APRN - CNP        ketorolac (TORADOL) injection 30 mg  30 mg Intravenous Q6H PRN Terrea Roberto Carlos, APRN - CNP        acetaminophen (TYLENOL) tablet 650 mg  650 mg Oral Q4H PRN Terrea Roberto Carlos, APRN - CNP        famotidine (PEPCID) tablet 20 mg  20 mg Oral BID Terrea Roberto Carlos, APRN - CNP   20 mg at 05/25/19 0413    barium sulfate (READI-CAT 2) 2 % suspension 450 mL  450 mL Oral ONCE PRN Terrea Roberto Carlos, APRN - CNP         Outpatient Medications Marked as Taking for the 5/24/19 encounter [facility] Encounter)   Medication Sig Dispense Refill    citalopram (CELEXA) 10 MG tablet Take 10 mg by mouth daily       Allergies   Allergen Reactions    Pcn [Penicillins]     Sulfa Antibiotics          ROS:  Unremarkable except for symptoms mentioned in HPI. PHYSICAL EXAMINATION:   VITAL SIGNS: /60   Pulse 98   Temp 98.2 °F (36.8 °C) (Oral)   Resp 18   Ht 5' 11\" (1.803 m)   Wt (!) 302 lb (137 kg)   SpO2 92%   BMI 42.12 kg/m²         GENERAL: In no acute distress, obese, alert and oriented to person place and time. SKIN: Warm and dry, without jaundice, ecchymoses, or petechiae. HEENT: Normocephalic, pink conjunctivae; sclerae anicteric, oral mucosa moist without lesion or exudate in the visible oral cavity or oropharynx, no epistaxis  NECK: supple ,no JVD, no thyromegaly  NODES: No palpable adenopathy in the neck , bilateral supraclavicular fossae, axillary chains, or inguinal regions. LUNGS: Good inspiratory effort, no accessory muscle use, clear bilaterally, no focal wheeze, rales or rhonchi. CARDIAC: Regular rate and rhythm,normal HS   ABDOMINAL: soft, non-tender, no mass or organomegaly. MUSKL: no tenderness over spine or ribs  EXTREMITIES: no pedal edema or calf tenderness  NEUROLOGIC: Gait not tested. No grossly apparent focal deficits.   PSYCH: cooperative; pt has appropriate affect and behavior    LAB RESULTS:  Recent Results (from the past 24 hour(s))   Comprehensive Metabolic Panel    Collection Time: 05/24/19 11:45 PM   Result Value Ref Range    Sodium 138 135 - 144 mEq/L    Potassium 3.9 3.4 - 4.9 mEq/L    Chloride 98 95 - 107 mEq/L    CO2 26 20 - 31 mEq/L    Anion Gap 14 9 - 15 mEq/L    Glucose 142 (H) 70 - 99 mg/dL    BUN 16 6 - 20 mg/dL    CREATININE 0.67 0.50 - 0.90 mg/dL    GFR Non-African American >60.0 >60    GFR  >60.0 >60    Calcium 9.3 8.5 - 9.9 mg/dL    Total Protein 7.5 6.3 - 8.0 g/dL    Alb 3.8 3.5 - 4.6 g/dL    Total Bilirubin 0.5 0.2 - 0.7 mg/dL    Alkaline Phosphatase 64 40 - 130 U/L    ALT 17 0 - 33 U/L    AST 16 0 - 35 U/L    Globulin 3.7 (H) 2.3 - 3.5 g/dL   CBC Auto Differential    Collection Time: 05/24/19 11:45 PM   Result Value Ref Range    WBC 10.8 4.8 - 10.8 K/uL    RBC 4.53 4.20 - 5.40 M/uL    Hemoglobin 12.9 12.0 - 16.0 g/dL    Hematocrit 38.0 37.0 - 47.0 %    MCV 84.0 82.0 - 100.0 fL    MCH 28.6 27.0 - 31.3 pg    MCHC 34.0 33.0 - 37.0 %    RDW 15.9 (H) 11.5 - 14.5 %    Platelets 766 381 - 297 K/uL    Neutrophils % 77.3 %    Lymphocytes % 14.9 %    Monocytes % 5.6 %    Eosinophils % 1.7 %    Basophils % 0.5 %    Neutrophils # 8.4 (H) 1.4 - 6.5 K/uL    Lymphocytes # 1.6 1.0 - 4.8 K/uL    Monocytes # 0.6 0.2 - 0.8 K/uL    Eosinophils # 0.2 0.0 - 0.7 K/uL    Basophils # 0.1 0.0 - 0.2 K/uL   Lactic Acid, Plasma    Collection Time: 05/24/19 11:45 PM   Result Value Ref Range    Lactic Acid 0.8 0.5 - 2.2 mmol/L   POCT Creatinine    Collection Time: 05/25/19 12:17 AM   Result Value Ref Range    POC CREATININE WHOLE BLOOD 0.7    Basic Metabolic Panel w/ Reflex to MG    Collection Time: 05/25/19  7:19 AM   Result Value Ref Range    Sodium 136 135 - 144 mEq/L    Potassium reflex Magnesium 3.9 3.4 - 4.9 mEq/L    Chloride 100 95 - 107 mEq/L    CO2 24 20 - 31 mEq/L    Anion Gap 12 9 - 15 mEq/L    Glucose 175 (H) 70 - 99 mg/dL    BUN 15 6 - 20 mg/dL    CREATININE 0.67 0.50 - 0.90 mg/dL    GFR Non- >60.0 >60    GFR  >60.0 >60    Calcium 8.7 8.5 - 9.9 mg/dL   CBC    Collection Time: 05/25/19  7:19 AM   Result Value Ref Range    WBC 9.4 4.8 - 10.8 K/uL    RBC 4.25 4.20 - 5.40 M/uL    Hemoglobin 11.8 (L) 12.0 - 16.0 g/dL    Hematocrit 35.1 (L) 37.0 - 47.0 %    MCV 82.7 82.0 - 100.0 fL    MCH 27.7 27.0 - 31.3 pg    MCHC 33.5 33.0 - 37.0 %    RDW 15.7 (H) 11.5 - 14.5 %    Platelets 791 453 - 758 K/uL   CEA    Collection Time: 05/25/19  7:19 AM   Result Value Ref Range    CEA 3.4 0.0 - 5.5 ng/mL       Collection Time: 05/25/19  7:19 AM   Result Value Ref Range     22.3 0.0 - 35.0 U/mL     Recent Labs     05/25/19  0719   GLUCOSE 175*            RADIOLOGY RESULTS:  Xr Chest Standard (2 Vw)    Result Date: 5/25/2019  XR CHEST (2 VW) : 5/24/2019 CLINICAL HISTORY:  COUGH, PAIN . COMPARISON: Chest CT 3/17/2016. TECHNIQUE: Upright PA and lateral radiographs of the chest were obtained. FINDINGS: Since the previous CT, large masses within the mid to lower lung fields have developed bilaterally, measuring up to 12.5 cm of the mid to inferior right upper lobe. Further evaluation with CT is suggested. There is no significant cardiomegaly, pleural effusion, vascular congestion, pneumothorax, or acute complication identified. LARGE PULMONARY MASSES HAVE DEVELOPED SINCE THE PREVIOUS CT. A FOLLOW-UP CT OF THE CHEST IS SUGGESTED. Ct Chest W Contrast    Result Date: 5/25/2019  EXAMINATION:  CT CHEST WITH IV CONTRAST CLINICAL HISTORY:  METASTATIC UTERINE CARCINOMA, PATIENT WITH INSPIRATORY CHEST PAIN AND PRODUCTIVE COUGH, ABNORMAL CHEST X-RAY COMPARISON:  3/17/2016 TECHNIQUE:  CT chest is obtained following IV injection of 75 mL of Isovue-370. Axial and MPR CT images of the chest are obtained. CT images are viewed with pulmonary and mediastinal window settings.  FINDINGS:  The CT chest with IV contrast demonstrates the following: Pulmonary: There are bilateral large

## 2019-05-26 NOTE — DISCHARGE SUMMARY
Physician Discharge Summary     Patient ID:  Lona Luo  [id number]  62 y.o.  1980    Admit date: 5/24/2019    Discharge date and time: 5/26/19     Admitting Physician: Amna Mann MD     Discharge Physician: Viktor Jo    Admission Diagnoses:  Cough and Lung masses    Discharge Diagnoses: bilateral lung masses    Admission Condition: fair    Discharged Condition: good    Indication for Admission: shortness of breath, and cough    Hospital Course:     Patient with history endometrial cancer s/p  Chemoradiation has been having chronic cough and chest pain with cxr showing bilateral lung masses. He received breathing treatment and was evaluated by oncologist and pulmonology. She was clinically stable. She was scheduled for out-patient bronchoscopy for lung mass biopsy. Consults: pulmonary/oncology    Significant Diagnostic Studies: radiology: CXR: large masses within the mid to lower lung fields have developed bilaterally, measuring up to 12.5 cm of the mid to inferior right upper lobe.     Treatments: respiratory therapy: O2 and albuterol/atropine nebulizer    Discharge Exam:  /67   Pulse 91   Temp 98.6 °F (37 °C)   Resp 18   Ht 5' 11\" (1.803 m)   Wt (!) 302 lb (137 kg)   SpO2 91%   BMI 42.12 kg/m²     General Appearance:    Alert, cooperative, no distress, appears stated age   Head:    Normocephalic, without obvious abnormality, atraumatic   Eyes:    PERRL, conjunctiva/corneas clear, EOM's intact, fundi     benign, both eyes   Ears:    Normal TM's and external ear canals, both ears   Nose:   Nares normal, septum midline, mucosa normal, no drainage    or sinus tenderness   Throat:   Lips, mucosa, and tongue normal; teeth and gums normal   Neck:   Supple, symmetrical, trachea midline, no adenopathy;     thyroid:  no enlargement/tenderness/nodules; no carotid    bruit or JVD   Back:     Symmetric, no curvature, ROM normal, no CVA tenderness   Lungs:     Clear to auscultation bilaterally, respirations unlabored   Chest Wall:    No tenderness or deformity    Heart:    Regular rate and rhythm, S1 and S2 normal, no murmur, rub   or gallop   Breast Exam:    No tenderness, masses, or nipple abnormality   Abdomen:     Soft, non-tender, bowel sounds active all four quadrants,     no masses, no organomegaly   Genitalia:    Normal female without lesion, discharge or tenderness   Rectal:    Normal tone ;guaiac negative stool   Extremities:   Extremities normal, atraumatic, no cyanosis or edema   Pulses:   2+ and symmetric all extremities   Skin:   Skin color, texture, turgor normal, no rashes or lesions   Lymph nodes:   Cervical, supraclavicular, and axillary nodes normal   Neurologic:   CNII-XII intact, normal strength, sensation and reflexes     throughout       Disposition: home    In process/preliminary results:  Outstanding Order Results     Date and Time Order Name Status Description    5/25/2019 0012 Culture Blood #1 Preliminary     5/25/2019 0012 Culture Blood #2 Preliminary           Patient Instructions:   Current Discharge Medication List      CONTINUE these medications which have NOT CHANGED    Details   citalopram (CELEXA) 10 MG tablet Take 10 mg by mouth daily           Activity: activity as tolerated  Diet: regular diet  Wound Care: none needed    Follow-up with pulmology  in 2 days.     Signed:  Mikayla Choi  5/26/2019  9:48 AM

## 2019-05-27 ENCOUNTER — HOSPITAL ENCOUNTER (EMERGENCY)
Age: 39
Discharge: HOME | End: 2019-05-27
Payer: COMMERCIAL

## 2019-05-27 VITALS
TEMPERATURE: 96.8 F | HEIGHT: 71 IN | HEART RATE: 98 BPM | SYSTOLIC BLOOD PRESSURE: 137 MMHG | OXYGEN SATURATION: 94 % | WEIGHT: 293 LBS | RESPIRATION RATE: 16 BRPM | BODY MASS INDEX: 41.02 KG/M2 | DIASTOLIC BLOOD PRESSURE: 72 MMHG

## 2019-05-27 DIAGNOSIS — Z00.00 WELL ADULT EXAM: Primary | ICD-10-CM

## 2019-05-27 DIAGNOSIS — R89.9 ABNORMAL LABORATORY TEST RESULT: ICD-10-CM

## 2019-05-27 LAB
ALBUMIN SERPL-MCNC: 3.4 G/DL (ref 3.5–4.6)
ALP BLD-CCNC: 58 U/L (ref 40–130)
ALT SERPL-CCNC: 16 U/L (ref 0–33)
ANION GAP SERPL CALCULATED.3IONS-SCNC: 14 MEQ/L (ref 9–15)
AST SERPL-CCNC: 16 U/L (ref 0–35)
BASOPHILS ABSOLUTE: 0.1 K/UL (ref 0–0.2)
BASOPHILS RELATIVE PERCENT: 1.2 %
BILIRUB SERPL-MCNC: <0.2 MG/DL (ref 0.2–0.7)
BUN BLDV-MCNC: 12 MG/DL (ref 6–20)
CALCIUM SERPL-MCNC: 8.7 MG/DL (ref 8.5–9.9)
CHLORIDE BLD-SCNC: 98 MEQ/L (ref 95–107)
CO2: 26 MEQ/L (ref 20–31)
CREAT SERPL-MCNC: 0.6 MG/DL (ref 0.5–0.9)
EOSINOPHILS ABSOLUTE: 0.2 K/UL (ref 0–0.7)
EOSINOPHILS RELATIVE PERCENT: 2.3 %
GFR AFRICAN AMERICAN: >60
GFR NON-AFRICAN AMERICAN: >60
GLOBULIN: 3.4 G/DL (ref 2.3–3.5)
GLUCOSE BLD-MCNC: 132 MG/DL (ref 70–99)
HCT VFR BLD CALC: 36.5 % (ref 37–47)
HEMOGLOBIN: 12.2 G/DL (ref 12–16)
LACTIC ACID: 1.2 MMOL/L (ref 0.5–2.2)
LYMPHOCYTES ABSOLUTE: 1.2 K/UL (ref 1–4.8)
LYMPHOCYTES RELATIVE PERCENT: 14.3 %
MCH RBC QN AUTO: 27.4 PG (ref 27–31.3)
MCHC RBC AUTO-ENTMCNC: 33.4 % (ref 33–37)
MCV RBC AUTO: 81.9 FL (ref 82–100)
MONOCYTES ABSOLUTE: 0.5 K/UL (ref 0.2–0.8)
MONOCYTES RELATIVE PERCENT: 5.9 %
NEUTROPHILS ABSOLUTE: 6.5 K/UL (ref 1.4–6.5)
NEUTROPHILS RELATIVE PERCENT: 76.3 %
PDW BLD-RTO: 15.4 % (ref 11.5–14.5)
PLATELET # BLD: 297 K/UL (ref 130–400)
POTASSIUM SERPL-SCNC: 3.9 MEQ/L (ref 3.4–4.9)
RBC # BLD: 4.45 M/UL (ref 4.2–5.4)
SODIUM BLD-SCNC: 138 MEQ/L (ref 135–144)
TOTAL CK: 62 U/L (ref 0–170)
TOTAL PROTEIN: 6.8 G/DL (ref 6.3–8)
WBC # BLD: 8.5 K/UL (ref 4.8–10.8)

## 2019-05-27 PROCEDURE — 36415 COLL VENOUS BLD VENIPUNCTURE: CPT

## 2019-05-27 PROCEDURE — 80053 COMPREHEN METABOLIC PANEL: CPT

## 2019-05-27 PROCEDURE — 87040 BLOOD CULTURE FOR BACTERIA: CPT

## 2019-05-27 PROCEDURE — 99283 EMERGENCY DEPT VISIT LOW MDM: CPT

## 2019-05-27 PROCEDURE — 83605 ASSAY OF LACTIC ACID: CPT

## 2019-05-27 PROCEDURE — 85025 COMPLETE CBC W/AUTO DIFF WBC: CPT

## 2019-05-27 PROCEDURE — 82550 ASSAY OF CK (CPK): CPT

## 2019-05-27 ASSESSMENT — ENCOUNTER SYMPTOMS
COUGH: 0
DIARRHEA: 0
VOMITING: 0
BACK PAIN: 0
ABDOMINAL PAIN: 0
SORE THROAT: 0
TROUBLE SWALLOWING: 0
NAUSEA: 0
COLOR CHANGE: 0
WHEEZING: 0
RHINORRHEA: 0
CHEST TIGHTNESS: 0
SHORTNESS OF BREATH: 0

## 2019-05-27 NOTE — ED TRIAGE NOTES
A & Ox4. Skin pink warm and dry. States was admitted from our ER on 5/25/19 because they found a mass in each lung and SOB. Sent home yesterday. Got a call today to come to ER for a positive \"critical\" blood culture. States she feels fine at this time. No complaints.

## 2019-05-27 NOTE — ED TRIAGE NOTES
Pt reports that she was admitted to the hospital and discharged yesterday pt was called today and was told to go to the ER pt alert rr even non labored ambulated to triage

## 2019-05-27 NOTE — CARE COORDINATION
5/27/2019 @ 9:53 CONTACTED PATIENT TO ADVISE OF POSITIVE BLOOD CULTURE. PER DR. HARRIS, PATIENT TO COME THE ER FOR READMISSION AND EVALUATION. PATIENT AGREEABLE.   Electronically signed by Dima Marie RN on 5/27/2019 at 9:54 AM

## 2019-05-28 ENCOUNTER — TELEPHONE (OUTPATIENT)
Dept: PULMONOLOGY | Age: 39
End: 2019-05-28

## 2019-05-28 LAB
BLOOD CULTURE, ROUTINE: ABNORMAL
ORGANISM: ABNORMAL
ORGANISM: ABNORMAL

## 2019-05-28 NOTE — ED PROVIDER NOTES
Neurological: Negative for dizziness, tremors, seizures, syncope, speech difficulty, weakness, light-headedness, numbness and headaches. Except as noted above the remainder of the review of systems was reviewed and negative.        PAST MEDICAL HISTORY     Past Medical History:   Diagnosis Date    Endometrial cancer ([facility] [address]     chemo and radiation     Past Surgical History:   Procedure Laterality Date    HYSTERECTOMY  10/2015     Social History     Socioeconomic History    Marital status: Single     Spouse name: None    Number of children: None    Years of education: None    Highest education level: None   Occupational History    None   Social Needs    Financial resource strain: None    Food insecurity:     Worry: None     Inability: None    Transportation needs:     Medical: None     Non-medical: None   Tobacco Use    Smoking status: Former Smoker     Types: Cigarettes     Last attempt to quit:      Years since quittin.4    Smokeless tobacco: Never Used   Substance and Sexual Activity    Alcohol use: Yes     Frequency: Never     Comment: socially    Drug use: Never    Sexual activity: None   Lifestyle    Physical activity:     Days per week: None     Minutes per session: None    Stress: None   Relationships    Social connections:     Talks on phone: None     Gets together: None     Attends Sabianism service: None     Active member of club or organization: None     Attends meetings of clubs or organizations: None     Relationship status: None    Intimate partner violence:     Fear of current or ex partner: None     Emotionally abused: None     Physically abused: None     Forced sexual activity: None   Other Topics Concern    None   Social History Narrative    None       SCREENINGS      @FLOW([id number])@      PHYSICAL EXAM    (up to 7 for level 4, 8 or more for level 5)     ED Triage Vitals [19 1751]   BP Temp Temp Source Pulse Resp SpO2 Height Weight   (!) 141/60 96.8 °F (36 °C) Temporal 104 16 94 % 5' 11\" (1.803 m) (!) 302 lb (137 kg)       Physical Exam   Constitutional: She is oriented to person, place, and time. She appears well-developed and well-nourished. No distress. HENT:   Head: Normocephalic and atraumatic. Right Ear: External ear normal.   Left Ear: External ear normal.   Nose: Nose normal.   Eyes: Pupils are equal, round, and reactive to light. Conjunctivae are normal. Right eye exhibits no discharge. Left eye exhibits no discharge. Neck: Normal range of motion. Cardiovascular: Normal rate, regular rhythm and intact distal pulses. Pulmonary/Chest: Effort normal.   Abdominal: Soft. She exhibits no distension. There is no tenderness. Musculoskeletal: Normal range of motion. She exhibits no tenderness. Neurological: She is alert and oriented to person, place, and time. Skin: Skin is warm and dry. Capillary refill takes less than 2 seconds. She is not diaphoretic.        DIAGNOSTIC RESULTS     EKG: All EKG's are interpreted by the Emergency Department Physician who either signs or Co-signsthis chart in the absence of a cardiologist.        RADIOLOGY:   Roxene Artist such as CT, Ultrasound and MRI are read by the radiologist. Asya Isabela radiographic images are visualized and preliminarily interpreted by the emergency physician with the below findings:        Interpretation per the Radiologist below, if available at the time ofthis note:    No orders to display         ED BEDSIDE ULTRASOUND:   Performed by ED Physician - none    LABS:  Labs Reviewed   CBC WITH AUTO DIFFERENTIAL - Abnormal; Notable for the following components:       Result Value    Hematocrit 36.5 (*)     MCV 81.9 (*)     RDW 15.4 (*)     All other components within normal limits   COMPREHENSIVE METABOLIC PANEL - Abnormal; Notable for the following components:    Glucose 132 (*)     Alb 3.4 (*)     All other components within normal limits   CULTURE BLOOD #2   CULTURE BLOOD #1   LACTIC ACID, PLASMA   CK       All other labs were within normal range or not returned as of this dictation. EMERGENCY DEPARTMENT COURSE and DIFFERENTIAL DIAGNOSIS/MDM:   Vitals:    Vitals:    05/27/19 1751 05/27/19 1945   BP: (!) 141/60 137/72   Pulse: 104 98   Resp: 16 16   Temp: 96.8 °F (36 °C)    TempSrc: Temporal    SpO2: 94% 94%   Weight: (!) 302 lb (137 kg)    Height: 5' 11\" (1.803 m)             MDM patient presents is afebrile nontoxic no acute distress hemodynamically stable no complaints pain to discomfort. Patient was directed to return for finding of one positive blood culture of the second blood culture showing negative. For patient safety and at the request and direction of the hospitalist group patient had repeat lab draws including CK and lactic acid. Patient's lab returns unremarkable no signs of acute infectious process in the physical exam or lab work. Patient is requesting be allowed to be discharged home. Placed a call out to the hospitalist on-call this evening to discuss the case for further direction. Spoke with on-call hospitalist Dr. Mayra Portillo he states that he would like to have a second set of blood cultures drawn that he will order an following this he was due the patient should be discharged. Patient encouraged return to the ER for any onset of new or worsening symptoms and follow up with pulmonologist as scheduled tomorrow. Patient verbalized understanding of all given instruction education. CRITICAL CARE TIME       CONSULTS:  None    PROCEDURES:  Unless otherwise noted below, none     Procedures    FINAL IMPRESSION      1. Well adult exam    2.  Abnormal laboratory test result          DISPOSITION/PLAN   DISPOSITION Discharge - Pending Orders Complete 05/27/2019 08:07:24 PM      PATIENT REFERRED TO:  Sheryle Kling, MD  [address]  [phone]    Call in 1 day        DISCHARGE MEDICATIONS:  New Prescriptions    No medications on file          (Please

## 2019-05-30 ENCOUNTER — ANESTHESIA EVENT (OUTPATIENT)
Dept: OPERATING ROOM | Age: 39
DRG: 204 | End: 2019-05-30
Payer: COMMERCIAL

## 2019-05-30 ENCOUNTER — APPOINTMENT (OUTPATIENT)
Dept: GENERAL RADIOLOGY | Age: 39
DRG: 204 | End: 2019-05-30
Attending: INTERNAL MEDICINE
Payer: COMMERCIAL

## 2019-05-30 ENCOUNTER — ANESTHESIA (OUTPATIENT)
Dept: OPERATING ROOM | Age: 39
DRG: 204 | End: 2019-05-30
Payer: COMMERCIAL

## 2019-05-30 ENCOUNTER — HOSPITAL ENCOUNTER (INPATIENT)
Age: 39
LOS: 3 days | Discharge: HOME | DRG: 204 | End: 2019-06-03
Attending: INTERNAL MEDICINE | Admitting: INTERNAL MEDICINE
Payer: COMMERCIAL

## 2019-05-30 VITALS — DIASTOLIC BLOOD PRESSURE: 89 MMHG | OXYGEN SATURATION: 99 % | SYSTOLIC BLOOD PRESSURE: 129 MMHG

## 2019-05-30 DIAGNOSIS — A49.8 FUSOBACTERIUM INFECTION: Primary | ICD-10-CM

## 2019-05-30 DIAGNOSIS — J98.4 CAVITARY PNEUMONIA: ICD-10-CM

## 2019-05-30 DIAGNOSIS — J18.9 CAVITARY PNEUMONIA: ICD-10-CM

## 2019-05-30 PROBLEM — R04.2 HEMOPTYSIS: Status: ACTIVE | Noted: 2019-05-30

## 2019-05-30 LAB
CULTURE, BLOOD 2: NORMAL
INR BLD: 1
PROTHROMBIN TIME: 10 SEC (ref 9–11.5)

## 2019-05-30 PROCEDURE — 6360000002 HC RX W HCPCS: Performed by: NURSE ANESTHETIST, CERTIFIED REGISTERED

## 2019-05-30 PROCEDURE — 7100000001 HC PACU RECOVERY - ADDTL 15 MIN: Performed by: INTERNAL MEDICINE

## 2019-05-30 PROCEDURE — 0BB48ZX EXCISION OF RIGHT UPPER LOBE BRONCHUS, VIA NATURAL OR ARTIFICIAL OPENING ENDOSCOPIC, DIAGNOSTIC: ICD-10-PCS | Performed by: INTERNAL MEDICINE

## 2019-05-30 PROCEDURE — G0378 HOSPITAL OBSERVATION PER HR: HCPCS

## 2019-05-30 PROCEDURE — 0B9C7ZX DRAINAGE OF RIGHT UPPER LUNG LOBE, VIA NATURAL OR ARTIFICIAL OPENING, DIAGNOSTIC: ICD-10-PCS | Performed by: INTERNAL MEDICINE

## 2019-05-30 PROCEDURE — 2580000003 HC RX 258: Performed by: INTERNAL MEDICINE

## 2019-05-30 PROCEDURE — 31628 BRONCHOSCOPY/LUNG BX EACH: CPT | Performed by: INTERNAL MEDICINE

## 2019-05-30 PROCEDURE — 88112 CYTOPATH CELL ENHANCE TECH: CPT

## 2019-05-30 PROCEDURE — 31624 DX BRONCHOSCOPE/LAVAGE: CPT | Performed by: INTERNAL MEDICINE

## 2019-05-30 PROCEDURE — 3609027000 HC BRONCHOSCOPY FIBEROPTIC: Performed by: INTERNAL MEDICINE

## 2019-05-30 PROCEDURE — 2500000003 HC RX 250 WO HCPCS: Performed by: INTERNAL MEDICINE

## 2019-05-30 PROCEDURE — 2500000003 HC RX 250 WO HCPCS: Performed by: NURSE ANESTHETIST, CERTIFIED REGISTERED

## 2019-05-30 PROCEDURE — 85610 PROTHROMBIN TIME: CPT

## 2019-05-30 PROCEDURE — 3700000000 HC ANESTHESIA ATTENDED CARE: Performed by: INTERNAL MEDICINE

## 2019-05-30 PROCEDURE — 71045 X-RAY EXAM CHEST 1 VIEW: CPT

## 2019-05-30 PROCEDURE — 6360000002 HC RX W HCPCS: Performed by: INTERNAL MEDICINE

## 2019-05-30 PROCEDURE — 88305 TISSUE EXAM BY PATHOLOGIST: CPT

## 2019-05-30 PROCEDURE — 2580000003 HC RX 258: Performed by: STUDENT IN AN ORGANIZED HEALTH CARE EDUCATION/TRAINING PROGRAM

## 2019-05-30 PROCEDURE — 0B958ZX DRAINAGE OF RIGHT MIDDLE LOBE BRONCHUS, VIA NATURAL OR ARTIFICIAL OPENING ENDOSCOPIC, DIAGNOSTIC: ICD-10-PCS | Performed by: INTERNAL MEDICINE

## 2019-05-30 PROCEDURE — 7100000000 HC PACU RECOVERY - FIRST 15 MIN: Performed by: INTERNAL MEDICINE

## 2019-05-30 PROCEDURE — 3209999900 FLUORO FOR SURGICAL PROCEDURES

## 2019-05-30 PROCEDURE — 6370000000 HC RX 637 (ALT 250 FOR IP): Performed by: INTERNAL MEDICINE

## 2019-05-30 PROCEDURE — 87205 SMEAR GRAM STAIN: CPT

## 2019-05-30 PROCEDURE — 2709999900 HC NON-CHARGEABLE SUPPLY: Performed by: INTERNAL MEDICINE

## 2019-05-30 PROCEDURE — 3700000001 HC ADD 15 MINUTES (ANESTHESIA): Performed by: INTERNAL MEDICINE

## 2019-05-30 PROCEDURE — 31625 BRONCHOSCOPY W/BIOPSY(S): CPT | Performed by: INTERNAL MEDICINE

## 2019-05-30 PROCEDURE — 87070 CULTURE OTHR SPECIMN AEROBIC: CPT

## 2019-05-30 PROCEDURE — 0BJ08ZZ INSPECTION OF TRACHEOBRONCHIAL TREE, VIA NATURAL OR ARTIFICIAL OPENING ENDOSCOPIC: ICD-10-PCS | Performed by: INTERNAL MEDICINE

## 2019-05-30 PROCEDURE — 6360000002 HC RX W HCPCS: Performed by: STUDENT IN AN ORGANIZED HEALTH CARE EDUCATION/TRAINING PROGRAM

## 2019-05-30 PROCEDURE — 31623 DX BRONCHOSCOPE/BRUSH: CPT | Performed by: INTERNAL MEDICINE

## 2019-05-30 PROCEDURE — 87075 CULTR BACTERIA EXCEPT BLOOD: CPT

## 2019-05-30 RX ORDER — LIDOCAINE HYDROCHLORIDE 20 MG/ML
INJECTION, SOLUTION EPIDURAL; INFILTRATION; INTRACAUDAL; PERINEURAL PRN
Status: DISCONTINUED | OUTPATIENT
Start: 2019-05-30 | End: 2019-05-30 | Stop reason: SDUPTHER

## 2019-05-30 RX ORDER — MAGNESIUM HYDROXIDE 1200 MG/15ML
LIQUID ORAL CONTINUOUS PRN
Status: COMPLETED | OUTPATIENT
Start: 2019-05-30 | End: 2019-05-30

## 2019-05-30 RX ORDER — DEXAMETHASONE SODIUM PHOSPHATE 10 MG/ML
INJECTION INTRAMUSCULAR; INTRAVENOUS PRN
Status: DISCONTINUED | OUTPATIENT
Start: 2019-05-30 | End: 2019-05-30 | Stop reason: SDUPTHER

## 2019-05-30 RX ORDER — PROPOFOL 10 MG/ML
INJECTION, EMULSION INTRAVENOUS PRN
Status: DISCONTINUED | OUTPATIENT
Start: 2019-05-30 | End: 2019-05-30 | Stop reason: SDUPTHER

## 2019-05-30 RX ORDER — SODIUM CHLORIDE, SODIUM LACTATE, POTASSIUM CHLORIDE, CALCIUM CHLORIDE 600; 310; 30; 20 MG/100ML; MG/100ML; MG/100ML; MG/100ML
INJECTION, SOLUTION INTRAVENOUS CONTINUOUS
Status: DISCONTINUED | OUTPATIENT
Start: 2019-05-30 | End: 2019-05-30

## 2019-05-30 RX ORDER — HYDRALAZINE HYDROCHLORIDE 20 MG/ML
10 INJECTION INTRAMUSCULAR; INTRAVENOUS EVERY 4 HOURS PRN
Status: DISCONTINUED | OUTPATIENT
Start: 2019-05-30 | End: 2019-06-03 | Stop reason: HOSPADM

## 2019-05-30 RX ORDER — LIDOCAINE HYDROCHLORIDE 20 MG/ML
INJECTION, SOLUTION EPIDURAL; INFILTRATION; INTRACAUDAL; PERINEURAL PRN
Status: DISCONTINUED | OUTPATIENT
Start: 2019-05-30 | End: 2019-05-30 | Stop reason: ALTCHOICE

## 2019-05-30 RX ORDER — ONDANSETRON 2 MG/ML
INJECTION INTRAMUSCULAR; INTRAVENOUS PRN
Status: DISCONTINUED | OUTPATIENT
Start: 2019-05-30 | End: 2019-05-30 | Stop reason: SDUPTHER

## 2019-05-30 RX ORDER — SODIUM CHLORIDE 0.9 % (FLUSH) 0.9 %
10 SYRINGE (ML) INJECTION EVERY 12 HOURS SCHEDULED
Status: DISCONTINUED | OUTPATIENT
Start: 2019-05-30 | End: 2019-06-03 | Stop reason: HOSPADM

## 2019-05-30 RX ORDER — FENTANYL CITRATE 50 UG/ML
INJECTION, SOLUTION INTRAMUSCULAR; INTRAVENOUS PRN
Status: DISCONTINUED | OUTPATIENT
Start: 2019-05-30 | End: 2019-05-30 | Stop reason: SDUPTHER

## 2019-05-30 RX ORDER — ONDANSETRON 2 MG/ML
4 INJECTION INTRAMUSCULAR; INTRAVENOUS
Status: COMPLETED | OUTPATIENT
Start: 2019-05-30 | End: 2019-05-30

## 2019-05-30 RX ORDER — CITALOPRAM 10 MG/1
10 TABLET ORAL DAILY
Status: DISCONTINUED | OUTPATIENT
Start: 2019-05-30 | End: 2019-06-03 | Stop reason: HOSPADM

## 2019-05-30 RX ORDER — ONDANSETRON 2 MG/ML
4 INJECTION INTRAMUSCULAR; INTRAVENOUS EVERY 6 HOURS PRN
Status: DISCONTINUED | OUTPATIENT
Start: 2019-05-30 | End: 2019-06-03 | Stop reason: HOSPADM

## 2019-05-30 RX ORDER — ESMOLOL HYDROCHLORIDE 10 MG/ML
INJECTION INTRAVENOUS PRN
Status: DISCONTINUED | OUTPATIENT
Start: 2019-05-30 | End: 2019-05-30 | Stop reason: SDUPTHER

## 2019-05-30 RX ORDER — ACETAMINOPHEN 325 MG/1
650 TABLET ORAL EVERY 4 HOURS PRN
Status: DISCONTINUED | OUTPATIENT
Start: 2019-05-30 | End: 2019-06-03 | Stop reason: HOSPADM

## 2019-05-30 RX ORDER — PROPOFOL 10 MG/ML
INJECTION, EMULSION INTRAVENOUS CONTINUOUS PRN
Status: DISCONTINUED | OUTPATIENT
Start: 2019-05-30 | End: 2019-05-30 | Stop reason: SDUPTHER

## 2019-05-30 RX ORDER — SODIUM CHLORIDE 0.9 % (FLUSH) 0.9 %
10 SYRINGE (ML) INJECTION PRN
Status: DISCONTINUED | OUTPATIENT
Start: 2019-05-30 | End: 2019-06-03 | Stop reason: HOSPADM

## 2019-05-30 RX ORDER — ROCURONIUM BROMIDE 10 MG/ML
INJECTION, SOLUTION INTRAVENOUS PRN
Status: DISCONTINUED | OUTPATIENT
Start: 2019-05-30 | End: 2019-05-30 | Stop reason: SDUPTHER

## 2019-05-30 RX ORDER — MIDAZOLAM HYDROCHLORIDE 1 MG/ML
INJECTION INTRAMUSCULAR; INTRAVENOUS PRN
Status: DISCONTINUED | OUTPATIENT
Start: 2019-05-30 | End: 2019-05-30 | Stop reason: SDUPTHER

## 2019-05-30 RX ADMIN — PROPOFOL 20 MG: 10 INJECTION, EMULSION INTRAVENOUS at 15:21

## 2019-05-30 RX ADMIN — FENTANYL CITRATE 25 MCG: 50 INJECTION, SOLUTION INTRAMUSCULAR; INTRAVENOUS at 15:21

## 2019-05-30 RX ADMIN — FENTANYL CITRATE 25 MCG: 50 INJECTION, SOLUTION INTRAMUSCULAR; INTRAVENOUS at 15:51

## 2019-05-30 RX ADMIN — ESMOLOL HYDROCHLORIDE 10 MG: 10 INJECTION, SOLUTION INTRAVENOUS at 15:49

## 2019-05-30 RX ADMIN — PROPOFOL 100 MG: 10 INJECTION, EMULSION INTRAVENOUS at 15:52

## 2019-05-30 RX ADMIN — LIDOCAINE HYDROCHLORIDE 100 MG: 20 INJECTION, SOLUTION EPIDURAL; INFILTRATION; INTRACAUDAL; PERINEURAL at 15:21

## 2019-05-30 RX ADMIN — SODIUM CHLORIDE, POTASSIUM CHLORIDE, SODIUM LACTATE AND CALCIUM CHLORIDE: 600; 310; 30; 20 INJECTION, SOLUTION INTRAVENOUS at 15:17

## 2019-05-30 RX ADMIN — ONDANSETRON 4 MG: 2 INJECTION INTRAMUSCULAR; INTRAVENOUS at 16:00

## 2019-05-30 RX ADMIN — ONDANSETRON 4 MG: 2 INJECTION INTRAMUSCULAR; INTRAVENOUS at 16:54

## 2019-05-30 RX ADMIN — ESMOLOL HYDROCHLORIDE 10 MG: 10 INJECTION, SOLUTION INTRAVENOUS at 15:52

## 2019-05-30 RX ADMIN — PROPOFOL 100 MCG/KG/MIN: 10 INJECTION, EMULSION INTRAVENOUS at 15:21

## 2019-05-30 RX ADMIN — ROCURONIUM BROMIDE 50 MG: 10 INJECTION INTRAVENOUS at 15:41

## 2019-05-30 RX ADMIN — SUGAMMADEX 300 MG: 100 INJECTION, SOLUTION INTRAVENOUS at 16:28

## 2019-05-30 RX ADMIN — MIDAZOLAM HYDROCHLORIDE 2 MG: 1 INJECTION, SOLUTION INTRAMUSCULAR; INTRAVENOUS at 15:17

## 2019-05-30 RX ADMIN — DEXAMETHASONE SODIUM PHOSPHATE 10 MG: 10 INJECTION INTRAMUSCULAR; INTRAVENOUS at 15:49

## 2019-05-30 RX ADMIN — CITALOPRAM HYDROBROMIDE 10 MG: 10 TABLET ORAL at 20:51

## 2019-05-30 RX ADMIN — SODIUM CHLORIDE, POTASSIUM CHLORIDE, SODIUM LACTATE AND CALCIUM CHLORIDE: 600; 310; 30; 20 INJECTION, SOLUTION INTRAVENOUS at 15:56

## 2019-05-30 RX ADMIN — ESMOLOL HYDROCHLORIDE 10 MG: 10 INJECTION, SOLUTION INTRAVENOUS at 15:58

## 2019-05-30 ASSESSMENT — PAIN - FUNCTIONAL ASSESSMENT: PAIN_FUNCTIONAL_ASSESSMENT: 0-10

## 2019-05-30 ASSESSMENT — PULMONARY FUNCTION TESTS
PIF_VALUE: 8
PIF_VALUE: 1
PIF_VALUE: 2
PIF_VALUE: 35
PIF_VALUE: 3
PIF_VALUE: 2
PIF_VALUE: 35
PIF_VALUE: 41
PIF_VALUE: 33
PIF_VALUE: 35
PIF_VALUE: 17
PIF_VALUE: 35
PIF_VALUE: 35
PIF_VALUE: 36
PIF_VALUE: 12
PIF_VALUE: 1
PIF_VALUE: 1
PIF_VALUE: 33
PIF_VALUE: 21
PIF_VALUE: 1
PIF_VALUE: 1
PIF_VALUE: 3
PIF_VALUE: 4
PIF_VALUE: 28
PIF_VALUE: 35
PIF_VALUE: 36
PIF_VALUE: 35
PIF_VALUE: 33
PIF_VALUE: 2
PIF_VALUE: 33
PIF_VALUE: 35
PIF_VALUE: 1
PIF_VALUE: 2
PIF_VALUE: 31
PIF_VALUE: 10
PIF_VALUE: 1
PIF_VALUE: 1
PIF_VALUE: 14
PIF_VALUE: 15
PIF_VALUE: 35
PIF_VALUE: 1
PIF_VALUE: 35
PIF_VALUE: 31
PIF_VALUE: 4
PIF_VALUE: 12
PIF_VALUE: 37
PIF_VALUE: 1
PIF_VALUE: 35
PIF_VALUE: 1
PIF_VALUE: 35
PIF_VALUE: 2
PIF_VALUE: 0
PIF_VALUE: 35
PIF_VALUE: 35
PIF_VALUE: 32
PIF_VALUE: 35
PIF_VALUE: 19
PIF_VALUE: 29
PIF_VALUE: 33
PIF_VALUE: 22
PIF_VALUE: 2
PIF_VALUE: 32
PIF_VALUE: 36
PIF_VALUE: 0
PIF_VALUE: 1
PIF_VALUE: -8
PIF_VALUE: 1
PIF_VALUE: 34
PIF_VALUE: 33
PIF_VALUE: 1
PIF_VALUE: 1

## 2019-05-30 ASSESSMENT — PAIN SCALES - GENERAL: PAINLEVEL_OUTOF10: 5

## 2019-05-30 NOTE — H&P
Hospital Medicine History & Physical      PCP: No primary care provider on file. Date of Admission: 5/30/2019    Date of Service: Pt seen/examined on 5/30/19 Admitted to Inpatient with expected LOS greater than two midnights due to medical therapy. Chief Complaint:  Post bronchoscopy       History Of Present Illness:   46 y.o. female  with  PMHx of endometrial cancer s/p hysterectomy, chemo and radiotion who is a direct admit to [address] [facility] following bronchoscopy d/t lung masses. Pt was reported to have had bleeding had a large dark brown emesis after procedure and also noted to have bleed from biopsy site. Pt still c/o occassional dry cough, but denies hemoptysis, fever, chills, N/V. Past Medical History:          Diagnosis Date    Endometrial cancer ([facility] [address] 2015    chemo and radiation    Prolonged emergence from general anesthesia 2015       Past Surgical History:          Procedure Laterality Date    HYSTERECTOMY  10/2015       Medications Prior to Admission:      Prior to Admission medications    Medication Sig Start Date End Date Taking? Authorizing Provider   citalopram (CELEXA) 10 MG tablet Take 10 mg by mouth daily   Yes Historical Provider, MD       Allergies:  Pcn [penicillins]; Sulfa antibiotics; and Sulfites    Social History:      The patient currently lives @ home     TOBACCO:   reports that she quit smoking about 4 years ago. Her smoking use included cigarettes. She has never used smokeless tobacco.  ETOH:   reports that she drinks alcohol. Family History:       Reviewed in detail and negative for DM, CAD, Cancer, CVA. Positive as follows:        Problem Relation Age of Onset    Cancer Mother         lymphoma    Diabetes Mother     No Known Problems Father     Diabetes Maternal Grandmother         questionable female cancer    Heart Disease Maternal Grandmother     Cancer Paternal Grandfather         stomach       REVIEW OF SYSTEMS:   Pertinent positives as noted in the HPI.  All other systems reviewed and negative. PHYSICAL EXAM:    BP (!) 164/71   Pulse 90   Temp 97.5 °F (36.4 °C) (Temporal)   Resp 25   Ht 5' 11\" (1.803 m)   Wt (!) 302 lb (137 kg)   SpO2 93%   BMI 42.12 kg/m²     General appearance:  No apparent distress, appears stated age and cooperative. HEENT:  Normal cephalic, atraumatic without obvious deformity. Pupils equal, round, and reactive to light. Extra ocular muscles intact. Conjunctivae/corneas clear. Neck: Supple, with full range of motion. No jugular venous distention. Trachea midline. Respiratory:  Normal respiratory effort. Clear to auscultation, diminished in the bases   Cardiovascular:  Regular rate and rhythm with normal S1/S2 without murmurs, rubs or gallops. Abdomen: Soft, non-tender, non-distended with normal bowel sounds. Musculoskeletal:  No clubbing, cyanosis or edema bilaterally. Full range of motion without deformity. Skin: Skin color, texture, turgor normal.  No rashes or lesions. Neurologic:  Neurovascularly intact without any focal sensory/motor deficits. Cranial nerves: II-XII intact, grossly non-focal.  Psychiatric:  Alert and oriented, thought content appropriate, normal insight  Capillary Refill: Brisk,< 3 seconds   Peripheral Pulses: +2 palpable, equal bilaterally       Labs:     No results for input(s): WBC, HGB, HCT, PLT in the last 72 hours. No results for input(s): NA, K, CL, CO2, BUN, CREATININE, CALCIUM, PHOS in the last 72 hours. Invalid input(s): MAGNES  No results for input(s): AST, ALT, BILIDIR, BILITOT, ALKPHOS in the last 72 hours. Recent Labs     05/30/19  1234   INR 1.0     No results for input(s): Terryann Haste in the last 72 hours.     Urinalysis:    No results found for: Poonam Quintana, BACTERIA, [address] [facility], [facility], [address], [place] [facility] [address]    Radiology:     CXR: I have reviewed the CXR with the following interpretation:   EKG:  I have reviewed the EKG with the following interpretation:     XR CHEST PORTABLE   Final Result      FLUORO FOR SURGICAL PROCEDURES   Final Result      XR CHEST STANDARD (2 VW)    (Results Pending)       ASSESSMENT/Plan:    Lung Mass: s/p bronchoscopy , with reports of hemoptysis,  monitor cbc for any depletion and any hemoptysis,  repeat chest x-ray in am, acapella, f/u with pulmonology     Nausea/ vomiting: improved,IV  zofran PRN    Elevated B/p : monitor for now PRN hydralazine for SBP> 165    DVT Prophylaxis:SCD   Diet: clear liquid  Code Status: Full Code    Dispo -internal      David Crenshaw, APRN - CNP    Thank you No primary care provider on file. for the opportunity to be involved in this patient's care. If you have any questions or concerns please feel free to contact me.

## 2019-05-30 NOTE — ANESTHESIA PRE PROCEDURE
Department of Anesthesiology  Preprocedure Note       Name:  Len Alejandro   Age:  46 y.o.  :  1980                                          MRN:  [id number]         Date:  2019      Surgeon: Abelino Soares):  Mary Kate Lee MD    Procedure: BRONCHOSCOPY (N/A )    Medications prior to admission:   Prior to Admission medications    Medication Sig Start Date End Date Taking? Authorizing Provider   citalopram (CELEXA) 10 MG tablet Take 10 mg by mouth daily   Yes Historical Provider, MD       Current medications:    Current Facility-Administered Medications   Medication Dose Route Frequency Provider Last Rate Last Dose    lactated ringers infusion   Intravenous Continuous Franklyn Plummer DO           Allergies:     Allergies   Allergen Reactions    Pcn [Penicillins] Hives    Sulfa Antibiotics Hives    Sulfites Other (See Comments)     Headache; Nausea       Problem List:    Patient Active Problem List   Diagnosis Code    Lung mass R91.8    H/O: hysterectomy Z90.710    Cough R05    Abnormal vaginal bleeding N93.9    Cavitating mass in right middle lung lobe J98.4    Endometrial cancer ([facility] [address] C54.1       Past Medical History:        Diagnosis Date    Endometrial cancer ([address]     chemo and radiation    Prolonged emergence from general anesthesia        Past Surgical History:        Procedure Laterality Date    HYSTERECTOMY  10/2015       Social History:    Social History     Tobacco Use    Smoking status: Former Smoker     Types: Cigarettes     Last attempt to quit: 2015     Years since quittin.4    Smokeless tobacco: Never Used   Substance Use Topics    Alcohol use: Yes     Frequency: Never     Comment: socially                                Counseling given: Not Answered      Vital Signs (Current):   Vitals:    19 1236   BP: 118/61   Pulse: 95   Resp: 20   Temp: 96.9 °F (36.1 °C)   TempSrc: Temporal   SpO2: 93%   Weight: (!) 302 lb (137 kg)   Height: 5' 11\" (1.803 m) BP Readings from Last 3 Encounters:   05/30/19 118/61   05/27/19 137/72   05/26/19 116/67       NPO Status: Time of last liquid consumption: 2345                        Time of last solid consumption: 2345                        Date of last liquid consumption: 05/29/19                        Date of last solid food consumption: 05/29/19    BMI:   Wt Readings from Last 3 Encounters:   05/30/19 (!) 302 lb (137 kg)   05/27/19 (!) 302 lb (137 kg)   05/25/19 (!) 302 lb (137 kg)     Body mass index is 42.12 kg/m². CBC:   Lab Results   Component Value Date    WBC 8.5 05/27/2019    RBC 4.45 05/27/2019    HGB 12.2 05/27/2019    HCT 36.5 05/27/2019    MCV 81.9 05/27/2019    RDW 15.4 05/27/2019     05/27/2019       CMP:   Lab Results   Component Value Date     05/27/2019    K 3.9 05/27/2019    K 4.4 05/26/2019    CL 98 05/27/2019    CO2 26 05/27/2019    BUN 12 05/27/2019    CREATININE 0.60 05/27/2019    GFRAA >60.0 05/27/2019    LABGLOM >60.0 05/27/2019    GLUCOSE 132 05/27/2019    PROT 6.8 05/27/2019    CALCIUM 8.7 05/27/2019    BILITOT <0.2 05/27/2019    ALKPHOS 58 05/27/2019    AST 16 05/27/2019    ALT 16 05/27/2019       POC Tests: No results for input(s): POCGLU, POCNA, POCK, POCCL, POCBUN, POCHEMO, POCHCT in the last 72 hours.     Coags:   Lab Results   Component Value Date    PROTIME 10.0 05/30/2019    INR 1.0 05/30/2019    APTT 26.1 05/28/2015       HCG (If Applicable): No results found for: PREGTESTUR, PREGSERUM, HCG, HCGQUANT     ABGs: No results found for: PHART, PO2ART, WDV4FWV, KKN7MQF, BEART, B1IIDJYK     Type & Screen (If Applicable):  No results found for: [facility]    Anesthesia Evaluation  Patient summary reviewed and Nursing notes reviewed no history of anesthetic complications:   Airway: Mallampati: II  TM distance: >3 FB   Neck ROM: full  Mouth opening: > = 3 FB Dental: normal exam         Pulmonary:Negative Pulmonary ROS and normal exam  breath sounds clear to auscultation                             Cardiovascular:Negative CV ROS  Exercise tolerance: good (>4 METS),           Rhythm: regular  Rate: normal           Beta Blocker:  Not on Beta Blocker         Neuro/Psych:   Negative Neuro/Psych ROS              GI/Hepatic/Renal: Neg GI/Hepatic/Renal ROS  (+) morbid obesity         ROS comment: BMI 42. Endo/Other:    (+) malignancy/cancer. Pt had PAT visit. Abdominal:           Vascular: negative vascular ROS. Anesthesia Plan      MAC     ASA 3       Induction: intravenous. MIPS: Prophylactic antiemetics administered. Anesthetic plan and risks discussed with patient. Plan discussed with CRNA.     Attending anesthesiologist reviewed and agrees with DO Sophy   5/30/2019

## 2019-05-30 NOTE — ANESTHESIA POSTPROCEDURE EVALUATION
Department of Anesthesiology  Postprocedure Note    Patient: Thang Hong  MRN: [id number]  YOB: 1980  Date of evaluation: 5/30/2019  Time:  4:38 PM     Procedure Summary     Date:  05/30/19 [place] / [place]  [place] [address] [place] [facility] [place]    Anesthesia Start:  4205 Anesthesia Stop:      Procedure:  BRONCHOSCOPY (N/A ) Diagnosis:  (LUNG NODULE)    Surgeon:  Malu Arnold MD Responsible Provider:  Gabo Redmond DO    Anesthesia Type:  MAC ASA Status:  3          Anesthesia Type: MAC    Jacky Phase I: Jacky Score: 10    Jacky Phase II:      Last vitals: Reviewed and per EMR flowsheets.        Anesthesia Post Evaluation    Patient location during evaluation: PACU  Patient participation: complete - patient participated  Level of consciousness: awake and awake and alert  Pain score: 0  Airway patency: patent  Nausea & Vomiting: no nausea and no vomiting  Complications: no  Cardiovascular status: blood pressure returned to baseline and hemodynamically stable  Respiratory status: acceptable  Hydration status: euvolemic

## 2019-05-30 NOTE — H&P
Patient seen and examined. Patient had bronchoscopy today for lung masses. She has a history of stage 3 endometrial cancer. Has hemoptysis post procedure. Hemoptysis improved at this time. She is not dyspneic. No chest pain. Vitals:    05/30/19 1805   BP: (!) 164/71   Pulse: 90   Resp: 25   Temp:    SpO2: 93%     On RA, NAD, RRR, lungs clear, neuro intact    # hemoptysis after bronch with biopsy for lung masses concerning for mets from endometrial cancer   - monitor ON  - CXR in am  - CBC am  - IS, acapella   - pulm on consult  - if remains stable, dc in am     # recent positive blood cultures- 1/2 GNR  - no symptoms or signs of infection.  Will repeat cultures now     Full H&P to follow     CHE[facility]nes[facility] Medicine

## 2019-05-30 NOTE — PROGRESS NOTES
Pt coughing a lot. Vomited a large amount of dark brownish liquid. Zofran given. Pt states she feels better.

## 2019-05-30 NOTE — H&P
Pulmonary and Critical Care Medicine   Encounter Date: 2019 3:02 PM    Ms. Adelaida Basurto is a 46 y.o. female  : 1980       HISTORY OF PRESENT ILLNESS:    Patient is 46 y.o. presents with multiple lung masses , no complaint, no chest pain, cough or SOB          Past Medical History:        Diagnosis Date    Endometrial cancer ([facility] [address]     chemo and radiation    Prolonged emergence from general anesthesia        Past Surgical History:        Procedure Laterality Date    HYSTERECTOMY  10/2015       Social History:     reports that she quit smoking about 4 years ago. Her smoking use included cigarettes. She has never used smokeless tobacco. She reports that she drinks alcohol. She reports that she does not use drugs. Family History:       Problem Relation Age of Onset    Cancer Mother         lymphoma    Diabetes Mother     No Known Problems Father     Diabetes Maternal Grandmother         questionable female cancer    Heart Disease Maternal Grandmother     Cancer Paternal Grandfather         stomach       Allergies:  Pcn [penicillins]; Sulfa antibiotics; and Sulfites        MEDICATIONS during current hospitalization:    Continuous Infusions:   lactated ringers         Scheduled Meds:    PRN Meds:ondansetron        REVIEW OF SYSTEMS:  ROS: 10 organs review of system is done including general, psychological, ENT, hematological, endocrine, respiratory, cardiovascular, gastrointestinal, musculoskeletal, neurological,  allergy and Immunology is done and is otherwise negative. PHYSICAL EXAM:    Vitals:  /61   Pulse 95   Temp 96.9 °F (36.1 °C) (Temporal)   Resp 20   Ht 5' 11\" (1.803 m)   Wt (!) 302 lb (137 kg)   SpO2 93%   BMI 42.12 kg/m²     General: alert, cooperative, no distress  Head: normocephalic, atraumatic  Eyes:No gross abnormalities.   ENT:  MMM no lesions  Neck:  supple and no masses  Chest : clear to auscultation bilaterally- no wheezes, rales or rhonchi, normal air movement, no respiratory distress  Heart[de-identified] Heart sounds are normal.  Regular rate and rhythm without murmur, gallop or rub. ABD:  symmetric, soft, non-tender  Musculoskeletal : no cyanosis, no clubbing and no edema  Neuro:  Grossly normal  Skin: No rashes or nodules noted. Lymph node:  no cervical nodes  Urology: No Farris   Psychiatric: appropriate        Data Review  Recent Labs     05/27/19  1845   WBC 8.5   HGB 12.2   HCT 36.5*         Recent Labs     05/27/19  1845      K 3.9   CL 98   CO2 26   BUN 12   CREATININE 0.60   GLUCOSE 132*       MV Settings: ABGs: No results for input(s): PHART, OTD5YCN, PO2ART, UNN9LGW, BEART, Z2GUHJAA, AQX5JSO in the last 72 hours. O2 Device: None (Room air)  Lab Results   Component Value Date    LACTA 1.2 05/27/2019    LACTA 0.8 05/24/2019              Assessment, plan:      Multiple masses likely metastatic disease   Plan for bronchoscopy with TBB/ BAL and brush     Discussed with patient risks include but not limited to bleeding, infection, lung collapse , cardiac arrhythmia, need for other procedures or allergy to med, benefits and alternatives discussed with patient.  Patient  agrees to proceed with procedure          Thank you for consultation    Electronically signed by Alfredo Gould MD, [facility],  on 5/30/2019 at 3:02 PM

## 2019-05-31 ENCOUNTER — APPOINTMENT (OUTPATIENT)
Dept: GENERAL RADIOLOGY | Age: 39
DRG: 204 | End: 2019-05-31
Attending: INTERNAL MEDICINE
Payer: COMMERCIAL

## 2019-05-31 PROBLEM — R78.81 BACTEREMIA: Status: ACTIVE | Noted: 2019-05-31

## 2019-05-31 LAB
ANION GAP SERPL CALCULATED.3IONS-SCNC: 13 MEQ/L (ref 9–15)
BASOPHILS ABSOLUTE: 0.1 K/UL (ref 0–0.2)
BASOPHILS RELATIVE PERCENT: 0.4 %
BLOOD CULTURE, ROUTINE: ABNORMAL
BLOOD CULTURE, ROUTINE: ABNORMAL
BUN BLDV-MCNC: 14 MG/DL (ref 6–20)
CALCIUM SERPL-MCNC: 9.2 MG/DL (ref 8.5–9.9)
CHLORIDE BLD-SCNC: 100 MEQ/L (ref 95–107)
CO2: 24 MEQ/L (ref 20–31)
CREAT SERPL-MCNC: 0.61 MG/DL (ref 0.5–0.9)
EOSINOPHILS ABSOLUTE: 0 K/UL (ref 0–0.7)
EOSINOPHILS RELATIVE PERCENT: 0.1 %
GFR AFRICAN AMERICAN: >60
GFR NON-AFRICAN AMERICAN: >60
GLUCOSE BLD-MCNC: 152 MG/DL (ref 70–99)
HCT VFR BLD CALC: 36.2 % (ref 37–47)
HEMOGLOBIN: 12.3 G/DL (ref 12–16)
INR BLD: 1
LYMPHOCYTES ABSOLUTE: 0.9 K/UL (ref 1–4.8)
LYMPHOCYTES RELATIVE PERCENT: 7.3 %
MCH RBC QN AUTO: 28 PG (ref 27–31.3)
MCHC RBC AUTO-ENTMCNC: 33.8 % (ref 33–37)
MCV RBC AUTO: 82.9 FL (ref 82–100)
MONOCYTES ABSOLUTE: 0.6 K/UL (ref 0.2–0.8)
MONOCYTES RELATIVE PERCENT: 5.2 %
NEUTROPHILS ABSOLUTE: 10.5 K/UL (ref 1.4–6.5)
NEUTROPHILS RELATIVE PERCENT: 87 %
ORGANISM: ABNORMAL
PDW BLD-RTO: 14.9 % (ref 11.5–14.5)
PLATELET # BLD: 343 K/UL (ref 130–400)
POTASSIUM REFLEX MAGNESIUM: 4.3 MEQ/L (ref 3.4–4.9)
PROTHROMBIN TIME: 10.4 SEC (ref 9–11.5)
RBC # BLD: 4.37 M/UL (ref 4.2–5.4)
SODIUM BLD-SCNC: 137 MEQ/L (ref 135–144)
WBC # BLD: 12 K/UL (ref 4.8–10.8)

## 2019-05-31 PROCEDURE — 1210000000 HC MED SURG R&B

## 2019-05-31 PROCEDURE — 80048 BASIC METABOLIC PNL TOTAL CA: CPT

## 2019-05-31 PROCEDURE — 99232 SBSQ HOSP IP/OBS MODERATE 35: CPT | Performed by: INTERNAL MEDICINE

## 2019-05-31 PROCEDURE — 71046 X-RAY EXAM CHEST 2 VIEWS: CPT

## 2019-05-31 PROCEDURE — 6360000002 HC RX W HCPCS: Performed by: INTERNAL MEDICINE

## 2019-05-31 PROCEDURE — 36415 COLL VENOUS BLD VENIPUNCTURE: CPT

## 2019-05-31 PROCEDURE — 85025 COMPLETE CBC W/AUTO DIFF WBC: CPT

## 2019-05-31 PROCEDURE — 6370000000 HC RX 637 (ALT 250 FOR IP): Performed by: INTERNAL MEDICINE

## 2019-05-31 PROCEDURE — 85610 PROTHROMBIN TIME: CPT

## 2019-05-31 PROCEDURE — 99222 1ST HOSP IP/OBS MODERATE 55: CPT | Performed by: INTERNAL MEDICINE

## 2019-05-31 PROCEDURE — 87040 BLOOD CULTURE FOR BACTERIA: CPT

## 2019-05-31 PROCEDURE — 2580000003 HC RX 258: Performed by: NURSE PRACTITIONER

## 2019-05-31 PROCEDURE — 2580000003 HC RX 258: Performed by: INTERNAL MEDICINE

## 2019-05-31 PROCEDURE — 02HV33Z INSERTION OF INFUSION DEVICE INTO SUPERIOR VENA CAVA, PERCUTANEOUS APPROACH: ICD-10-PCS | Performed by: INTERNAL MEDICINE

## 2019-05-31 RX ADMIN — Medication 10 ML: at 23:12

## 2019-05-31 RX ADMIN — MEROPENEM 1 G: 1 INJECTION, POWDER, FOR SOLUTION INTRAVENOUS at 23:11

## 2019-05-31 RX ADMIN — CITALOPRAM HYDROBROMIDE 10 MG: 10 TABLET ORAL at 21:23

## 2019-05-31 RX ADMIN — MEROPENEM 100 MG: 500 INJECTION, POWDER, FOR SOLUTION INTRAVENOUS at 17:39

## 2019-05-31 NOTE — PLAN OF CARE
Problem: Pain - Acute  Goal: Pain control  5/30/2019 2316 by Morelia Mckeon RN  Outcome: Ongoing  5/30/2019 2314 by Morelia Mckeon RN  Outcome: Ongoing     Problem: Breathing Pattern - Ineffective  Goal: Effective breathing pattern  5/30/2019 2316 by Morelia Mckeon RN  Outcome: Ongoing  5/30/2019 2314 by Morelia Mckeon RN  Outcome: Ongoing     Problem: Pain:  Description  Pain management should include both nonpharmacologic and pharmacologic interventions.   Goal: Pain level will decrease  Outcome: Ongoing  Goal: Control of acute pain  Outcome: Ongoing  Goal: Control of chronic pain  Outcome: Ongoing

## 2019-05-31 NOTE — PLAN OF CARE
Problem: Pain - Acute  Goal: Pain control  Outcome: Ongoing     Problem: Breathing Pattern - Ineffective  Goal: Effective breathing pattern  Outcome: Ongoing

## 2019-05-31 NOTE — PROGRESS NOTES
Physician Progress Note    2019   1:34 PM    Name:  Theresa Garladn  MRN:    [id number]      Day: 0     Admit Date: 2019 11:18 AM  PCP: No primary care provider on file. Code Status:  Full Code    Subjective:      no new events. Feels well. Physical Examination:      Vitals:  /68   Pulse 78   Temp 97.3 °F (36.3 °C) (Oral)   Resp 16   Ht 5' 11\" (1.803 m)   Wt (!) 302 lb (137 kg)   SpO2 93%   BMI 42.12 kg/m²   Temp (24hrs), Av.4 °F (36.3 °C), Min:97.3 °F (36.3 °C), Max:97.5 °F (36.4 °C)      General appearance: alert, cooperative and no distress  Mental Status: oriented to person, place and time and normal affect  Lungs: clear to auscultation bilaterally, normal effort  Heart: regular rate and rhythm, no murmur  Abdomen: soft, nontender, nondistended, bowel sounds present, no masses  Extremities: no edema, redness, tenderness in the calves  Skin: no gross lesions, rashes    Data:     Labs:  Recent Labs     19  1138   WBC 12.0*   HGB 12.3        Recent Labs     19  1139      K 4.3      CO2 24   BUN 14   CREATININE 0.61   GLUCOSE 152*     No results for input(s): AST, ALT, ALB, BILITOT, ALKPHOS in the last 72 hours.     Current Facility-Administered Medications   Medication Dose Route Frequency Provider Last Rate Last Dose    sodium chloride flush 0.9 % injection 10 mL  10 mL Intravenous 2 times per day Graylon Barley, APRN - CNP        sodium chloride flush 0.9 % injection 10 mL  10 mL Intravenous PRN Graylon Barley, APRN - CNP        magnesium hydroxide (MILK OF MAGNESIA) 400 MG/5ML suspension 30 mL  30 mL Oral Daily PRN Graylon Barley, APRN - CNP        ondansetron (ZOFRAN) injection 4 mg  4 mg Intravenous Q6H PRN Graylon Barley, APRN - CNP        acetaminophen (TYLENOL) tablet 650 mg  650 mg Oral Q4H PRN Graylon Barley, APRN - CNP        citalopram (CELEXA) tablet 10 mg  10 mg Oral Daily Zeus Gray DO   10 mg at 19    benzocaine-menthol (CEPACOL SORE THROAT) lozenge 1 lozenge  1 lozenge Oral Q2H PRN Lenor Brunner, APRN - CNP        hydrALAZINE (APRESOLINE) injection 10 mg  10 mg Intravenous Q4H PRN Lenor Brunner, APRN - CNP         Assessment and Plan:           Active problems:    # hemoptysis after bronchoscopy   - stable  - pulm on board   - ok to dc from pulm standpoint     # fusibacterium bacteremia positive twice in 1/2 on , 5/25 and 5/27, no symptoms or signs of infection, h/o endometrial cancer and mets  - per ID treatment is needed, will need to start IV abx     # depression   - resume celexa     DVT/PPx        DISCHARGE PLANNING  Pending ID [id number]/u        Electronically signed by Simin Patino DO on 5/31/2019 at 1:34 PM

## 2019-05-31 NOTE — CONSULTS
Infectious Diseases Inpatient Consult Note      Reason for Consult:   Bacteremia  Requesting Physician:   Dr Campos Lizama  Primary Care Physician:  No primary care provider on file. History Obtained From:   Pt, EPIC    Admit Date: 2019  Hospital Day: 2      HISTORY OF PRESENT ILLNESS:  This is a 46 y.o. female was admitted to [address] [place] [address]  from home with hemoptysis post Lung Bx. Was hospitalized on  with acute SOB, pleuritic chest pain that she felt in the back, 3 months productive cough, weight loss of 40 lbs, decreased appetite and night sweats. Has H/O endometrial cancer treated in  with ChemoRx, RTX and ERICKA.     CHIEF COMPLAINT:       Past Medical History:   Diagnosis Date    Endometrial cancer ([facility] [address]     chemo and radiation    Prolonged emergence from general anesthesia        Past Surgical History:   Procedure Laterality Date    HYSTERECTOMY  10/2015       Current Medications:     meropenem  1 g Intravenous Q8H    meropenem  100 mg Intravenous Once    sodium chloride flush  10 mL Intravenous 2 times per day    citalopram  10 mg Oral Daily       Allergies:  Pcn [penicillins]; Sulfa antibiotics; and Sulfites    Social History     Socioeconomic History    Marital status: Single     Spouse name: Not on file    Number of children: Not on file    Years of education: Not on file    Highest education level: Not on file   Occupational History    Not on file   Social Needs    Financial resource strain: Not on file    Food insecurity:     Worry: Not on file     Inability: Not on file    Transportation needs:     Medical: Not on file     Non-medical: Not on file   Tobacco Use    Smoking status: Former Smoker     Types: Cigarettes     Last attempt to quit:      Years since quittin.4    Smokeless tobacco: Never Used   Substance and Sexual Activity    Alcohol use: Yes     Frequency: Never     Comment: socially    Drug use: Never    Sexual activity: Not Currently   Lifestyle    Physical activity:     Days per week: Not on file     Minutes per session: Not on file    Stress: Not on file   Relationships    Social connections:     Talks on phone: Not on file     Gets together: Not on file     Attends Jain service: Not on file     Active member of club or organization: Not on file     Attends meetings of clubs or organizations: Not on file     Relationship status: Not on file    Intimate partner violence:     Fear of current or ex partner: Not on file     Emotionally abused: Not on file     Physically abused: Not on file     Forced sexual activity: Not on file   Other Topics Concern    Not on file   Social History Narrative    Not on file         Family History:   Family History   Problem Relation Age of Onset    Cancer Mother         lymphoma    Diabetes Mother     No Known Problems Father     Diabetes Maternal Grandmother         questionable female cancer    Heart Disease Maternal Grandmother     Cancer Paternal Grandfather         stomach       Review of Systems  14 system review is negative other than HPI    Physical Exam  Vitals:    05/30/19 1800 05/30/19 1805 05/30/19 1942 05/31/19 0735   BP: (!) 161/69 (!) 164/71 122/68    Pulse: 92 90 78    Resp: 27 25 16    Temp:   97.3 °F (36.3 °C)    TempSrc:   Oral Oral   SpO2: 92% 93% 93%    Weight:       Height:         General Appearance: alert and oriented to person, place and time, well-developed and well-nourished, in no acute distress  Skin: warm and dry, no rash. Head: normocephalic and atraumatic  Eyes: extraocular eye movements intact, conjunctivae normal, anicteric sclerae  ENT: oropharynx clear and moist with normal mucous membranes.  No thrush  Lungs: normal respiratory effort, diminished BS with few rales B bases, no rhonchi, no crackles, no wheezes  Heart:RRR, nl S1/S2, no murmur  Abdomen: soft, no tenderness, no H-S-megaly, + BS  NEUROLOGICAL: alert and oriented x 3, no focal deficits  Trace B leg edema  No erythema, no

## 2019-05-31 NOTE — PROGRESS NOTES
Pt resting quietly in bed. Admission and assessment completed. Oriented to room call light and phone. Pt tolerating clear liquids no nausea or emesis. Hourly rounds continue. Call light in reach.

## 2019-05-31 NOTE — CARE COORDINATION
IV BENEFIT REQUEST FORM    FAX FROM:  [facility]                        [address] [place]    REQUESTED BY: Electronically signed by Mirtha Oliveira RN on 2019 at 4:35 PM                                               RN/C3: PHONE: [phone]    DATE:/TIME OF REQUEST: 19  TIME: 4:35 PM      TO: Julia Banks 238 TO: [phone]    PHONE: [phone]     THIS PATIENT HAS BEEN IDENTIFIED TO POSSIBLY NEED LONG TERM IV'S. PLEASE CHECK INSURANCE COVERAGE FOR THE FOLLOWING PT/DRUGS. PATIENT'S NAME: Terri Bazzi                              [facility]   PATIENT'S : 1980  PATIENT ADDRESS: [address] [place] [address]    PAYOR NAME:  Payor: Lorena Cannon / Plan: Lorena Cannon / Product Type: *No Product type* /     DRUG: MERREM                      DOSE: 1 GRAM            FREQUENCY: Q 8 HR  __________ CHECK HERE IF PT HAS NO INSURANCE AND REQUESTING SELF PAY COST. *IF [facility] IS NOT A PROVIDER FOR THIS PATIENT, PLEASE FORWARD INFO VIA FAX TO [facility] @ [phone],(PHONE NUMBER: [phone]) TO RUN BENEFIT VERIFICATION AND NOTIFY THE ABOVE C3 OF THIS PLAN. (FAX FACE SHEET WITH DEMOGRAPHICS AND INSURANCE INFO WITH THIS FORM.)  PLEASE FAX BENEFIT INFO TO: THE [phone]    This message is intended only for the use of the individual or entity to which it is addressed and may contain information that is privileged, confidential, and exempt from disclosure under applicable law. If the reader of the notice is not intended recipient of the employee/agent responsible for delivering the message to the intended recipient, you are hereby notified than any dissemination, distribution or copying of this communication is strictly prohibited. Please contact the sender for further instructions on handling the information.

## 2019-06-01 LAB
ANION GAP SERPL CALCULATED.3IONS-SCNC: 12 MEQ/L (ref 9–15)
BASOPHILS ABSOLUTE: 0.1 K/UL (ref 0–0.2)
BASOPHILS RELATIVE PERCENT: 0.8 %
BUN BLDV-MCNC: 16 MG/DL (ref 6–20)
CALCIUM SERPL-MCNC: 8.6 MG/DL (ref 8.5–9.9)
CHLORIDE BLD-SCNC: 105 MEQ/L (ref 95–107)
CO2: 23 MEQ/L (ref 20–31)
CREAT SERPL-MCNC: 0.46 MG/DL (ref 0.5–0.9)
EOSINOPHILS ABSOLUTE: 0.1 K/UL (ref 0–0.7)
EOSINOPHILS RELATIVE PERCENT: 1.2 %
GFR AFRICAN AMERICAN: >60
GFR NON-AFRICAN AMERICAN: >60
GLUCOSE BLD-MCNC: 134 MG/DL (ref 70–99)
HCT VFR BLD CALC: 34.8 % (ref 37–47)
HEMOGLOBIN: 11.4 G/DL (ref 12–16)
LYMPHOCYTES ABSOLUTE: 1.1 K/UL (ref 1–4.8)
LYMPHOCYTES RELATIVE PERCENT: 16.2 %
MCH RBC QN AUTO: 27.8 PG (ref 27–31.3)
MCHC RBC AUTO-ENTMCNC: 32.8 % (ref 33–37)
MCV RBC AUTO: 84.7 FL (ref 82–100)
MONOCYTES ABSOLUTE: 0.4 K/UL (ref 0.2–0.8)
MONOCYTES RELATIVE PERCENT: 6.8 %
NEUTROPHILS ABSOLUTE: 4.9 K/UL (ref 1.4–6.5)
NEUTROPHILS RELATIVE PERCENT: 75 %
PDW BLD-RTO: 15.5 % (ref 11.5–14.5)
PLATELET # BLD: 247 K/UL (ref 130–400)
POTASSIUM REFLEX MAGNESIUM: 4.3 MEQ/L (ref 3.4–4.9)
RBC # BLD: 4.11 M/UL (ref 4.2–5.4)
SODIUM BLD-SCNC: 140 MEQ/L (ref 135–144)
WBC # BLD: 6.5 K/UL (ref 4.8–10.8)

## 2019-06-01 PROCEDURE — 1210000000 HC MED SURG R&B

## 2019-06-01 PROCEDURE — 85025 COMPLETE CBC W/AUTO DIFF WBC: CPT

## 2019-06-01 PROCEDURE — 2580000003 HC RX 258: Performed by: INTERNAL MEDICINE

## 2019-06-01 PROCEDURE — 80048 BASIC METABOLIC PNL TOTAL CA: CPT

## 2019-06-01 PROCEDURE — 6370000000 HC RX 637 (ALT 250 FOR IP): Performed by: INTERNAL MEDICINE

## 2019-06-01 PROCEDURE — 6360000002 HC RX W HCPCS: Performed by: INTERNAL MEDICINE

## 2019-06-01 PROCEDURE — 99232 SBSQ HOSP IP/OBS MODERATE 35: CPT | Performed by: INTERNAL MEDICINE

## 2019-06-01 PROCEDURE — 2580000003 HC RX 258: Performed by: NURSE PRACTITIONER

## 2019-06-01 PROCEDURE — 36415 COLL VENOUS BLD VENIPUNCTURE: CPT

## 2019-06-01 RX ADMIN — Medication 10 ML: at 10:14

## 2019-06-01 RX ADMIN — MEROPENEM 1 G: 1 INJECTION, POWDER, FOR SOLUTION INTRAVENOUS at 06:35

## 2019-06-01 RX ADMIN — CITALOPRAM HYDROBROMIDE 10 MG: 10 TABLET ORAL at 21:40

## 2019-06-01 RX ADMIN — Medication 10 ML: at 14:44

## 2019-06-01 RX ADMIN — Medication 10 ML: at 21:41

## 2019-06-01 RX ADMIN — MEROPENEM 1 G: 1 INJECTION, POWDER, FOR SOLUTION INTRAVENOUS at 14:42

## 2019-06-01 ASSESSMENT — ENCOUNTER SYMPTOMS
COUGH: 1
SHORTNESS OF BREATH: 1
GASTROINTESTINAL NEGATIVE: 1

## 2019-06-01 ASSESSMENT — PAIN SCALES - GENERAL: PAINLEVEL_OUTOF10: 2

## 2019-06-01 NOTE — PROGRESS NOTES
Nutrition Assessment    Type and Reason for Visit: Positive Nutrition Screen, Initial(+ malnutrition screen)    Nutrition Recommendations:   Continue with General diet  Pt not interested In nutritional supplement at this time    Nutrition Assessment: Nutritional status adequate at this time, at risk for decline in nutritionals status due to newly dx metastatic ca ( endometrial to lung). Pt not interested in nutritional supplements at this time    Malnutrition Assessment:  · Malnutrition Status: At risk for malnutrition  · Context: Chronic illness  · Findings of the 6 clinical characteristics of malnutrition (Minimum of 2 out of 6 clinical characteristics is required to make the diagnosis of moderate or severe Protein Calorie Malnutrition based on AND/ASPEN Guidelines):  1. Energy Intake-Greater than 75% of estimated energy requirement, Greater than or equal to 3 months    2. Weight Loss-10% loss or greater, in 6 months  3. Fat Loss-No significant subcutaneous fat loss,    4. Muscle Loss-No significant muscle mass loss,    5. Fluid Accumulation-No significant fluid accumulation,    6.  Strength-     Nutrition Risk Level: Moderate    Nutrient Needs:  · Estimated Daily Total Kcal: 4270-0728 kcals, ( 15-18 kcal/kg)  · Estimated Daily Protein (g): 84-98 g protein ( 1.2-1.4 g/kg IBW)  · Estimated Daily Total Fluid (ml/day): ~2200 ( 1 ml/kcal)    Nutrition Diagnosis:   · Problem: Unintended weight loss  · Etiology: related to Catabolic illness     Signs and symptoms:  as evidenced by Weight loss    Objective Information:  · Nutrition-Focused Physical Findings: no significant findings.  hx : endometrial Ca 2015, newly dx lung Ca  · Wound Type: None  · Current Nutrition Therapies:  · Oral Diet Orders: General   · Oral Diet intake: %  · Oral Nutrition Supplement (ONS) Orders: None  · Anthropometric Measures:  · Ht: 5' 11\" (180.3 cm)   · Current Body Wt: 289 lb (131.1 kg)(6/1)  · Admission Body Wt: 302 lb (137 kg)(stated)  · Usual Body Wt: 340 lb (154.2 kg)(2016, started to notice weight loss 12/2018)  · % Weight Change:  ,  ~50# ( 15%) ~ 6  months, states was trying to lose weight, unceratin how much weight was lost ' unintentionally'  · Ideal Body Wt: 155 lb (70.3 kg), % Ideal Body > 100%  · BMI Classification: BMI > or equal to 40.0 Obese Class III    Nutrition Interventions:   Continue current diet  Continued Inpatient Monitoring, Education Not Indicated    Nutrition Evaluation:   · Evaluation: Goals set   · Goals: po > 75%    · Monitoring: Meal Intake, Weight      Electronically signed by Pao Mckenna RD, LD on 6/1/19 at 9:44 AM

## 2019-06-01 NOTE — PROGRESS NOTES
for input(s): PHART, BHD2JIW, PO2ART, KOE0YTK, BEART, T9SYQIBO in the last 72 hours. O2 Device: None (Room air)  O2 Flow Rate (L/min): 3 L/min    DIET GENERAL;     MEDICATIONS during current hospitalization:    Continuous Infusions:    Scheduled Meds:   meropenem  1 g Intravenous Q8H    sodium chloride flush  10 mL Intravenous 2 times per day    citalopram  10 mg Oral Daily       PRN Meds:sodium chloride flush, magnesium hydroxide, ondansetron, acetaminophen, benzocaine-menthol, hydrALAZINE    Radiology  Xr Chest Standard (2 Vw)    Result Date: 5/31/2019  XR CHEST (2 VW) : 5/31/2019 CLINICAL HISTORY:  post bronchoscopy . COMPARISON: Portable chest 5/30/2019 and chest CT 5/25/2019. TECHNIQUE: Upright PA and lateral radiographs of the chest were obtained. FINDINGS: An endotracheal tube has been removed. Large masses predominately within the hilar regions have not significantly changed. There is no pneumothorax, significant pleural effusion, or other post bronchoscopy complication identified. NO EVIDENCE OF A POST BRONCHOSCOPY COMPLICATION, OR CHANGE FROM PRIOR STUDIES IDENTIFIED. Xr Chest Standard (2 Vw)    Result Date: 5/25/2019  XR CHEST (2 VW) : 5/24/2019 CLINICAL HISTORY:  COUGH, PAIN . COMPARISON: Chest CT 3/17/2016. TECHNIQUE: Upright PA and lateral radiographs of the chest were obtained. FINDINGS: Since the previous CT, large masses within the mid to lower lung fields have developed bilaterally, measuring up to 12.5 cm of the mid to inferior right upper lobe. Further evaluation with CT is suggested. There is no significant cardiomegaly, pleural effusion, vascular congestion, pneumothorax, or acute complication identified. LARGE PULMONARY MASSES HAVE DEVELOPED SINCE THE PREVIOUS CT. A FOLLOW-UP CT OF THE CHEST IS SUGGESTED.      Ct Chest W Contrast    Result Date: 5/25/2019  EXAMINATION:  CT CHEST WITH IV CONTRAST CLINICAL HISTORY:  METASTATIC UTERINE CARCINOMA, PATIENT WITH INSPIRATORY CHEST PAIN AND PRODUCTIVE COUGH, ABNORMAL CHEST X-RAY COMPARISON:  3/17/2016 TECHNIQUE:  CT chest is obtained following IV injection of 75 mL of Isovue-370. Axial and MPR CT images of the chest are obtained. CT images are viewed with pulmonary and mediastinal window settings. FINDINGS:  The CT chest with IV contrast demonstrates the following: Pulmonary: There are bilateral large inhomogeneously hypodense pulmonary masses. The largest tumor mass is found in the RUL but there are additional relatively large tumor masses in both lower lobes. The right upper lobe mass/consolidation demonstrates central cavitation which may very well represent tumor necrosis. In view of the patient's history of uterine carcinoma, the bilateral pulmonary mass are most likely a manifestation of extensive metastatic disease to both lungs. There are no pleural effusions. There is no pneumothorax. Heart and mediastinum: The heart is not enlarged and there is no pericardial effusion. There is mediastinal and bilateral hilar adenopathy which likely represent metastatic adenopathy. There is no axillary adenopathy and there is no supraclavicular adenopathy. Thoracic aorta and great vessels appear unremarkable. Upper abdomen unremarkable. Osseous structures: Mild degenerative bone spurring in the thoracic spine. No thoracic spine fracture or malalignment. Sternum and ribs appear intact. No osteolytic or osteoblastic metastases visible in the bony thorax. 1. BILATERALLY PULMONARY MASSES COMPATIBLE WITH EXTENSIVE METASTATIC DISEASE TO THE LUNGS. 2. MEDIASTINAL AND BILATERAL HILAR ADENOPATHY LIKELY REPRESENTS METASTATIC ADENOPATHY. All CT scans at this facility use dose modulation, iterative reconstruction, and/or weight based dosing when appropriate to reduce radiation dose to as low as reasonably achievable.     Ct Abdomen Pelvis W Iv Contrast Additional Contrast? Radiologist Recommendation    Result Date: 5/25/2019  CT ABDOMEN PELVIS W IV CONTRAST: UNREMARKABLE DOCUMENTATION OF BRONCHOSCOPY X-RAY DOSE SUMMARY: 3 FLUOROSCOPIC IMAGES SAVED TO PACS. 0SPOT FILM WAS EXPOSED. 76.8SECOND FLUOROSCOPY TIME. 17.53MGY CUMULATIVE X-RAY DOSE. IMPRESSION AND SUGGESTION:  1. Fusobacterium bacteremia, high grade  2. Community acquired pneumonia, cavitary, multilobar  3. H/O endometrial cancer, treated in 2015  4. Metastatic lung disease, likely from uterine ca, S/P biopsy   5. Bleed during biopsy, highly vascular tumor, now subsided   6. Obesity , ? RAFAEL will need out patient eval     She is  not having any more hemoptysis. Antibiotic as per ID. Dr. Ynes Rouse will see patient as outpatient in 1 week. She had biopsy done followed by hemoptysis which is resolved.   Possible sleep apnea need out patient eval          Electronically signed by Jimmy Roland MD, FCCP on 6/1/2019 at 3:30 PM

## 2019-06-01 NOTE — CARE COORDINATION
Discharge plan remains home and she will come to outpatient infusion for iv antibiotics. Will follow.

## 2019-06-01 NOTE — FLOWSHEET NOTE
Patient has been up in her room throughout the day with visitors at bedside. Dr Cooper Nash and Dr Cirilo Walton visited. On Merrem IVAB q8 hrs. Tele. NST-113. No hemoptosis noted or cough. HGB.11.4 for today. Lung clear w/ slight rales in lower bases. No SOB noted.

## 2019-06-01 NOTE — PLAN OF CARE
Problem: Pain - Acute  Goal: Pain control  Outcome: Ongoing     Problem: Breathing Pattern - Ineffective  Goal: Effective breathing pattern  Outcome: Ongoing     Problem: Pain:  Description  Pain management should include both nonpharmacologic and pharmacologic interventions.   Goal: Pain level will decrease  Outcome: Ongoing  Goal: Control of acute pain  Outcome: Ongoing  Goal: Control of chronic pain  Outcome: Ongoing

## 2019-06-01 NOTE — PROGRESS NOTES
Physician Progress Note    2019   1:37 PM    Name:  Thang Hong  MRN:    [id number]     IP Day: 1     Admit Date: 2019 11:18 AM  PCP: No primary care provider on file. Code Status:  Full Code    Subjective:      no new events. Feels well. Physical Examination:      Vitals:  /65   Pulse 97   Temp 98.4 °F (36.9 °C) (Oral)   Resp 16   Ht 5' 11\" (1.803 m)   Wt 289 lb (131.1 kg)   SpO2 97%   BMI 40.31 kg/m²   Temp (24hrs), Av.4 °F (36.9 °C), Min:98.4 °F (36.9 °C), Max:98.4 °F (36.9 °C)      General appearance: alert, cooperative and no distress  Mental Status: oriented to person, place and time and normal affect  Lungs: clear to auscultation bilaterally, normal effort  Heart: regular rate and rhythm, no murmur  Abdomen: soft, nontender, nondistended, bowel sounds present, no masses  Extremities: no edema, redness, tenderness in the calves  Skin: no gross lesions, rashes    Data:     Labs:  Recent Labs     19  1138 19  0708   WBC 12.0* 6.5   HGB 12.3 11.4*    247     Recent Labs     19  1139 19  0707    140   K 4.3 4.3    105   CO2 24 23   BUN 14 16   CREATININE 0.61 0.46*   GLUCOSE 152* 134*     No results for input(s): AST, ALT, ALB, BILITOT, ALKPHOS in the last 72 hours.     Current Facility-Administered Medications   Medication Dose Route Frequency Provider Last Rate Last Dose    meropenem (MERREM) 1 g in sodium chloride 0.9 % 100 mL IVPB (mini-bag)  1 g Intravenous Cleotha Frankel, MD   Stopped at 19 0813    sodium chloride flush 0.9 % injection 10 mL  10 mL Intravenous 2 times per day Lenor Brunner, APRN - CNP   10 mL at 19 1014    sodium chloride flush 0.9 % injection 10 mL  10 mL Intravenous PRN Lenor Brunner, APRN - CNP        magnesium hydroxide (MILK OF MAGNESIA) 400 MG/5ML suspension 30 mL  30 mL Oral Daily PRN Lenor Brunner, APRN - CNP        ondansetron (ZOFRAN) injection 4 mg  4 mg Intravenous Q6H PRN

## 2019-06-01 NOTE — PROGRESS NOTES
Shanita Oliva is a 46 y. o.female patient. Fusobacterium bacteremia    pneumonia, cavitary, multilobar          hemoptysis post Lung Bx   3 months productive cough, weight loss of 40 lbs   05/25 with acute SOB, pleuritic chest pain    endometrial cancer         Organism Abnormal  05/25/2019 12:12 AM  - [facility]   Fusobacterium species    Blood Culture, Routine 05/25/2019 12:12 AM [address] for   No further workup                EXAMINATION: XR CHEST PORTABLE       CLINICAL HISTORY:  post bronch        COMPARISONS: CT thorax, May 25, 2019       FINDINGS: Single AP portable view the chest obtained showing unremarkable intubation. The large bilateral lung masses, right greater than left persist. There is no pneumothorax. There is no pleural effusion. The mediastinal silhouette is unremarkable.       CONCLUSION: LARGE LUNG MASSES. NO PNEUMOTHORAX FOLLOWING BRONCHOSCOPY     EXAMINATION:  CT CHEST WITH IV CONTRAST       CLINICAL HISTORY:  METASTATIC UTERINE CARCINOMA, PATIENT WITH INSPIRATORY CHEST PAIN AND PRODUCTIVE COUGH, ABNORMAL CHEST X-RAY       COMPARISON:  3/17/2016       TECHNIQUE:  CT chest is obtained following IV injection of 75 mL of Isovue-370. Axial and MPR CT images of the chest are obtained. CT images are viewed with pulmonary and mediastinal window settings.       FINDINGS:  The CT chest with IV contrast demonstrates the following:       Pulmonary: There are bilateral large inhomogeneously hypodense pulmonary masses. The largest tumor mass is found in the RUL but there are additional relatively large tumor masses in both lower lobes. The right upper lobe mass/consolidation demonstrates    central cavitation which may very well represent tumor necrosis. In view of the patient's history of uterine carcinoma, the bilateral pulmonary mass are most likely a manifestation of extensive metastatic disease to both lungs. There are no pleural    effusions.  There is no pneumothorax.       Heart and mediastinum: The heart is not enlarged and there is no pericardial effusion. There is mediastinal and bilateral hilar adenopathy which likely represent metastatic adenopathy. There is no axillary adenopathy and there is no supraclavicular    adenopathy. Thoracic aorta and great vessels appear unremarkable. Upper abdomen unremarkable.       Osseous structures: Mild degenerative bone spurring in the thoracic spine. No thoracic spine fracture or malalignment. Sternum and ribs appear intact. No osteolytic or osteoblastic metastases visible in the bony thorax.           Impression   1. BILATERALLY PULMONARY MASSES COMPATIBLE WITH EXTENSIVE METASTATIC DISEASE TO THE LUNGS. 2. MEDIASTINAL AND BILATERAL HILAR ADENOPATHY LIKELY REPRESENTS METASTATIC ADENOPATHY.          Current Facility-Administered Medications   Medication Dose Route Frequency Provider Last Rate Last Dose    meropenem (MERREM) 1 g in sodium chloride 0.9 % 100 mL IVPB (mini-bag)  1 g Intravenous Q8H Brown Dooley MD   Stopped at 06/01/19 0813    sodium chloride flush 0.9 % injection 10 mL  10 mL Intravenous 2 times per day Love [address]Whale Path, APRN - CNP   10 mL at 06/01/19 1014    sodium chloride flush 0.9 % injection 10 mL  10 mL Intravenous PRN Love Snowball, APRN - CNP        magnesium hydroxide (MILK OF MAGNESIA) 400 MG/5ML suspension 30 mL  30 mL Oral Daily PRN Love Snowball, APRN - CNP        ondansetron (ZOFRAN) injection 4 mg  4 mg Intravenous Q6H PRN Love Snowball, APRN - CNP        acetaminophen (TYLENOL) tablet 650 mg  650 mg Oral Q4H PRN Love Snowball, APRN - CNP        citalopram (CELEXA) tablet 10 mg  10 mg Oral Daily Brayden Alaniz, DO   10 mg at 05/31/19 2123    benzocaine-menthol (CEPACOL SORE THROAT) lozenge 1 lozenge  1 lozenge Oral Q2H PRN Love Snowball, APRN - CNP        hydrALAZINE (APRESOLINE) injection 10 mg  10 mg Intravenous Q4H PRN Love Snowball, APRN - CNP           Allergies

## 2019-06-01 NOTE — PROGRESS NOTES
Pt up steady in room. Medications given per orders. merrem infused with out any reactions. . Frequent checks to room call light in reach.

## 2019-06-01 NOTE — PROGRESS NOTES
Pt resting in bed no distress. meds given per orders. Lungs diminished with rhonchi and moist non productive cough. Hourly rounds continue. Call light in reach.

## 2019-06-02 LAB
ANION GAP SERPL CALCULATED.3IONS-SCNC: 10 MEQ/L (ref 9–15)
BASOPHILS ABSOLUTE: 0.1 K/UL (ref 0–0.2)
BASOPHILS RELATIVE PERCENT: 0.7 %
BUN BLDV-MCNC: 13 MG/DL (ref 6–20)
CALCIUM SERPL-MCNC: 8.7 MG/DL (ref 8.5–9.9)
CHLORIDE BLD-SCNC: 101 MEQ/L (ref 95–107)
CO2: 27 MEQ/L (ref 20–31)
CREAT SERPL-MCNC: 0.6 MG/DL (ref 0.5–0.9)
CULTURE, BLOOD 2: NORMAL
EOSINOPHILS ABSOLUTE: 0.2 K/UL (ref 0–0.7)
EOSINOPHILS RELATIVE PERCENT: 2.3 %
GFR AFRICAN AMERICAN: >60
GFR NON-AFRICAN AMERICAN: >60
GLUCOSE BLD-MCNC: 150 MG/DL (ref 70–99)
HCT VFR BLD CALC: 35.6 % (ref 37–47)
HEMOGLOBIN: 12.2 G/DL (ref 12–16)
LYMPHOCYTES ABSOLUTE: 1 K/UL (ref 1–4.8)
LYMPHOCYTES RELATIVE PERCENT: 12.7 %
MCH RBC QN AUTO: 28.5 PG (ref 27–31.3)
MCHC RBC AUTO-ENTMCNC: 34.4 % (ref 33–37)
MCV RBC AUTO: 82.9 FL (ref 82–100)
MONOCYTES ABSOLUTE: 0.5 K/UL (ref 0.2–0.8)
MONOCYTES RELATIVE PERCENT: 5.7 %
NEUTROPHILS ABSOLUTE: 6.4 K/UL (ref 1.4–6.5)
NEUTROPHILS RELATIVE PERCENT: 78.6 %
PDW BLD-RTO: 15.2 % (ref 11.5–14.5)
PLATELET # BLD: 313 K/UL (ref 130–400)
POTASSIUM REFLEX MAGNESIUM: 4.2 MEQ/L (ref 3.4–4.9)
RBC # BLD: 4.3 M/UL (ref 4.2–5.4)
SODIUM BLD-SCNC: 138 MEQ/L (ref 135–144)
WBC # BLD: 8.1 K/UL (ref 4.8–10.8)

## 2019-06-02 PROCEDURE — 6370000000 HC RX 637 (ALT 250 FOR IP): Performed by: INTERNAL MEDICINE

## 2019-06-02 PROCEDURE — 2580000003 HC RX 258: Performed by: INTERNAL MEDICINE

## 2019-06-02 PROCEDURE — 36415 COLL VENOUS BLD VENIPUNCTURE: CPT

## 2019-06-02 PROCEDURE — 85025 COMPLETE CBC W/AUTO DIFF WBC: CPT

## 2019-06-02 PROCEDURE — 80048 BASIC METABOLIC PNL TOTAL CA: CPT

## 2019-06-02 PROCEDURE — 6360000002 HC RX W HCPCS: Performed by: INTERNAL MEDICINE

## 2019-06-02 PROCEDURE — 1210000000 HC MED SURG R&B

## 2019-06-02 PROCEDURE — 99225 PR SBSQ OBSERVATION CARE/DAY 25 MINUTES: CPT | Performed by: INTERNAL MEDICINE

## 2019-06-02 PROCEDURE — 2580000003 HC RX 258: Performed by: NURSE PRACTITIONER

## 2019-06-02 RX ADMIN — MEROPENEM 1 G: 1 INJECTION, POWDER, FOR SOLUTION INTRAVENOUS at 07:46

## 2019-06-02 RX ADMIN — MEROPENEM 1 G: 1 INJECTION, POWDER, FOR SOLUTION INTRAVENOUS at 00:17

## 2019-06-02 RX ADMIN — CITALOPRAM HYDROBROMIDE 10 MG: 10 TABLET ORAL at 21:24

## 2019-06-02 RX ADMIN — Medication 10 ML: at 07:48

## 2019-06-02 RX ADMIN — Medication 10 ML: at 21:24

## 2019-06-02 RX ADMIN — MEROPENEM 1 G: 1 INJECTION, POWDER, FOR SOLUTION INTRAVENOUS at 15:36

## 2019-06-02 RX ADMIN — Medication 10 ML: at 15:36

## 2019-06-02 ASSESSMENT — ENCOUNTER SYMPTOMS
GASTROINTESTINAL NEGATIVE: 1
COUGH: 1
SHORTNESS OF BREATH: 1

## 2019-06-02 ASSESSMENT — PAIN SCALES - GENERAL: PAINLEVEL_OUTOF10: 0

## 2019-06-02 NOTE — PROGRESS NOTES
Physician Progress Note    2019   1:56 PM    Name:  Walter Schrader  MRN:    [id number]      Day: 2     Admit Date: 2019 11:18 AM  PCP: No primary care provider on file. Code Status:  Full Code    Subjective:      no new events. Feels well. Physical Examination:      Vitals:  /68   Pulse 90   Temp 99.1 °F (37.3 °C) (Oral)   Resp 17   Ht 5' 11\" (1.803 m)   Wt 296 lb 6.4 oz (134.4 kg)   SpO2 96%   BMI 41.34 kg/m²   Temp (24hrs), Av.1 °F (37.3 °C), Min:99.1 °F (37.3 °C), Max:99.1 °F (37.3 °C)      General appearance: alert, cooperative and no distress  Mental Status: oriented to person, place and time and normal affect  Lungs: clear to auscultation bilaterally, normal effort  Heart: regular rate and rhythm, no murmur  Abdomen: soft, nontender, nondistended, bowel sounds present, no masses  Extremities: no edema, redness, tenderness in the calves  Skin: no gross lesions, rashes    Data:     Labs:  Recent Labs     19  0708 19  0723   WBC 6.5 8.1   HGB 11.4* 12.2    313     Recent Labs     19  0707 19  0723    138   K 4.3 4.2    101   CO2 23 27   BUN 16 13   CREATININE 0.46* 0.60   GLUCOSE 134* 150*     No results for input(s): AST, ALT, ALB, BILITOT, ALKPHOS in the last 72 hours.     Current Facility-Administered Medications   Medication Dose Route Frequency Provider Last Rate Last Dose    meropenem (MERREM) 1 g in sodium chloride 0.9 % 100 mL IVPB (mini-bag)  1 g Intravenous Q8H Maia Escoto  mL/hr at 19 0746 1 g at 19 0746    sodium chloride flush 0.9 % injection 10 mL  10 mL Intravenous 2 times per day THONG Pak - CNP   10 mL at 19 0748    sodium chloride flush 0.9 % injection 10 mL  10 mL Intravenous PRN THONG Pak CNP   10 mL at 19 1444    magnesium hydroxide (MILK OF MAGNESIA) 400 MG/5ML suspension 30 mL  30 mL Oral Daily PRN THONG Pak CNP        ondansetron (ZOFRAN)

## 2019-06-02 NOTE — PROGRESS NOTES
INPATIENT PROGRESS NOTES    PATIENT NAME: Candace Addison  MRN: [id number]  SERVICE DATE:  June 2, 2019   SERVICE TIME:  2:24 PM      PRIMARY SERVICE: Pulmonary Disease    CHIEF COMPLAIN: Hemoptysis      INTERVAL HPI: Patient seen and examined at bedside, Interval Notes, orders reviewed. Nursing notes noted  Patient is not having any more hemoptysis. She does have some cough, mostly dry. She is having exertional shortness of breath. No chest pain or pleuritic pain. No nausea vomiting diarrhea. No fever or chills. She Will have PICC line tomorrow    OBJECTIVE    Body mass index is 41.34 kg/m². PHYSICAL EXAM:  Vitals:  /68   Pulse 90   Temp 99.1 °F (37.3 °C) (Oral)   Resp 17   Ht 5' 11\" (1.803 m)   Wt 296 lb 6.4 oz (134.4 kg)   SpO2 96%   BMI 41.34 kg/m²   General: Obese, Alert, awake . comfortable in bed, No distress. appears stated age and cooperative  Head: Atraumatic , Normocephalic   Eyes: PERRL. No sclera icterus. No conjunctival injection. No discharge   ENT: No nasal  discharge. Pharynx clear. lips, teeth, mucosa and gums are normal, tongue protrudes in the midline  Neck:  Trachea midline. No thyromegaly, no JVD, No cervical adenopathy. Chest : Bilaterally symmetrical ,Normal effort,  No accessory muscle use  Lung : . Fair BS bilateral, decreased BS at bases. No Rales. No wheezing. No rhonchi. No dullness on percussion. Heart[de-identified] Normal  rate. Regular rhythm. No mumur ,  Rub or gallop  ABD: Non-tender. Non-distended. No masses. No organmegaly. Normal bowel sounds. No hernia.   Ext : No Pitting both leg , No Cyanosis No clubbing  Neuro: no focal weakness          DATA:   Recent Labs     06/01/19  0708 06/02/19  0723   WBC 6.5 8.1   HGB 11.4* 12.2   HCT 34.8* 35.6*   MCV 84.7 82.9    313     Recent Labs     06/01/19  0707 06/02/19  0723    138   K 4.3 4.2    101   CO2 23 27   BUN 16 13   CREATININE 0.46* 0.60   GLUCOSE 134* 150*   CALCIUM 8.6 8.7   LABGLOM >60.0 >60.0 METASTATIC UTERINE CARCINOMA, PATIENT WITH INSPIRATORY CHEST PAIN AND PRODUCTIVE COUGH, ABNORMAL CHEST X-RAY COMPARISON:  3/17/2016 TECHNIQUE:  CT chest is obtained following IV injection of 75 mL of Isovue-370. Axial and MPR CT images of the chest are obtained. CT images are viewed with pulmonary and mediastinal window settings. FINDINGS:  The CT chest with IV contrast demonstrates the following: Pulmonary: There are bilateral large inhomogeneously hypodense pulmonary masses. The largest tumor mass is found in the RUL but there are additional relatively large tumor masses in both lower lobes. The right upper lobe mass/consolidation demonstrates central cavitation which may very well represent tumor necrosis. In view of the patient's history of uterine carcinoma, the bilateral pulmonary mass are most likely a manifestation of extensive metastatic disease to both lungs. There are no pleural effusions. There is no pneumothorax. Heart and mediastinum: The heart is not enlarged and there is no pericardial effusion. There is mediastinal and bilateral hilar adenopathy which likely represent metastatic adenopathy. There is no axillary adenopathy and there is no supraclavicular adenopathy. Thoracic aorta and great vessels appear unremarkable. Upper abdomen unremarkable. Osseous structures: Mild degenerative bone spurring in the thoracic spine. No thoracic spine fracture or malalignment. Sternum and ribs appear intact. No osteolytic or osteoblastic metastases visible in the bony thorax. 1. BILATERALLY PULMONARY MASSES COMPATIBLE WITH EXTENSIVE METASTATIC DISEASE TO THE LUNGS. 2. MEDIASTINAL AND BILATERAL HILAR ADENOPATHY LIKELY REPRESENTS METASTATIC ADENOPATHY. All CT scans at this facility use dose modulation, iterative reconstruction, and/or weight based dosing when appropriate to reduce radiation dose to as low as reasonably achievable.     Ct Abdomen Pelvis W Iv Contrast Additional Contrast? Radiologist Recommendation    Result Date: 5/25/2019  CT ABDOMEN PELVIS W IV CONTRAST: 5/25/2019 CLINICAL HISTORY:  ENDOMETRIAL CANCER, ASSESS CERVICAL INVOLVEMENT . COMPARISON: 3/17/2016 and chest CT from earlier 5/25/2019. TECHNIQUE: Spiral images were obtained of the abdomen and pelvis neck Isovue. All CT scans at this facility use dose modulation, iterative reconstruction, and/or weight based dosing when appropriate to reduce radiation dose to as low as reasonably achievable. FINDINGS: Masses within the inferior hemithoraces appear unchanged from the earlier chest CTA. There has been previous hysterectomy. There are no abnormal masses, lymphadenopathy, ascites, or other significant changes in the abdomen and pelvis from 3/17/2016 identified. A moderate to markedly enlarged fatty liver appears unchanged. The spleen remains mildly enlarged. A small calcified gallstone within an otherwise normal-appearing gallstone is noted. The pancreas, adrenal glands, kidneys, great vessels, bowel loops, nearly decompressed urinary bladder, and additional images of pelvis are unremarkable. NO EVIDENCE OF RECURRENT PELVIC MASSES, OR OTHER SIGNIFICANT CHANGES IN THE ABDOMEN AND PELVIS FROM 3/17/2016 IDENTIFIED. ENLARGED FATTY LIVER. CHOLELITHIASIS. Xr Chest Portable    Result Date: 5/30/2019  EXAMINATION: XR CHEST PORTABLE CLINICAL HISTORY:  post bronch COMPARISONS: CT thorax, May 25, 2019 FINDINGS: Single AP portable view the chest obtained showing unremarkable intubation. The large bilateral lung masses, right greater than left persist. There is no pneumothorax. There is no pleural effusion. The mediastinal silhouette is unremarkable. CONCLUSION: LARGE LUNG MASSES. NO PNEUMOTHORAX FOLLOWING BRONCHOSCOPY.     Fluoro For Surgical Procedures    Result Date: 5/30/2019  EXAMINATION: FLUORO FOR SURGICAL PROCEDURES CLINICAL HISTORY:  bronch COMPARISONS: 8/25/2019 CT thorax FINDINGS: 3 fluoroscopic images were saved during bronchoscopy, with a bronchoscope directed into the right mainstem bronchus. CONCLUSION: UNREMARKABLE DOCUMENTATION OF BRONCHOSCOPY X-RAY DOSE SUMMARY: 3 FLUOROSCOPIC IMAGES SAVED TO PACS. 0SPOT FILM WAS EXPOSED. 76.8SECOND FLUOROSCOPY TIME. 17.53MGY CUMULATIVE X-RAY DOSE. IMPRESSION AND SUGGESTION:  1. Fusobacterium bacteremia, high grade  2. Community acquired pneumonia, cavitary, multilobar  3. H/O endometrial cancer, treated in 2015  4. Metastatic lung disease, likely from uterine ca, S/P biopsy   5. Bleed during biopsy, highly vascular tumor, now subsided   6. Obesity , ? RAFAEL will need out patient eval     She is not not having any more hemoptysis. Antibiotic as per ID. Dr. Jenn Anand will see patient as outpatient in 1 week. She had biopsy done followed by hemoptysis which is resolved.   Possible sleep apnea need out patient eval, continue present treatment plan          Electronically signed by Yessy Rabago MD, FCCP on 6/2/2019 at 2:24 PM

## 2019-06-02 NOTE — PROGRESS NOTES
no pneumothorax.       Heart and mediastinum: The heart is not enlarged and there is no pericardial effusion. There is mediastinal and bilateral hilar adenopathy which likely represent metastatic adenopathy. There is no axillary adenopathy and there is no supraclavicular    adenopathy. Thoracic aorta and great vessels appear unremarkable. Upper abdomen unremarkable.       Osseous structures: Mild degenerative bone spurring in the thoracic spine. No thoracic spine fracture or malalignment. Sternum and ribs appear intact. No osteolytic or osteoblastic metastases visible in the bony thorax.           Impression   1. BILATERALLY PULMONARY MASSES COMPATIBLE WITH EXTENSIVE METASTATIC DISEASE TO THE LUNGS. 2. MEDIASTINAL AND BILATERAL HILAR ADENOPATHY LIKELY REPRESENTS METASTATIC ADENOPATHY. Allergies   Allergen Reactions    Pcn [Penicillins] Hives    Sulfa Antibiotics Hives    Sulfites Other (See Comments)     Headache; Nausea     Patient Active Problem List    Diagnosis Date Noted    Bacteremia 05/31/2019    Fusobacterium infection     Cavitary pneumonia     Hemoptysis 05/30/2019    Cavitating mass in right middle lung lobe     Endometrial cancer ([facility] [address]     Lung mass 05/25/2019    H/O: hysterectomy 05/25/2019    Cough 05/25/2019    Abnormal vaginal bleeding 05/25/2019       /68   Pulse 90   Temp 99.1 °F (37.3 °C) (Oral)   Resp 17   Ht 5' 11\" (1.803 m)   Wt 296 lb 6.4 oz (134.4 kg)   SpO2 96%   BMI 41.34 kg/m²     Review of Systems   Constitutional: Negative for diaphoresis, fatigue and fever. HENT: Negative. Respiratory: Positive for cough and shortness of breath. Cardiovascular: Negative. Gastrointestinal: Negative. Genitourinary: Negative. Musculoskeletal: Negative. Neurological: Negative. Physical Exam   Constitutional: She is oriented to person, place, and time. HENT:   Head: Normocephalic. Eyes: Pupils are equal, round, and reactive to light. Cardiovascular: Normal heart sounds. No murmur heard. Pulmonary/Chest: Effort normal and breath sounds normal. No respiratory distress. She has no wheezes. She has no rales. She exhibits no tenderness. Abdominal: Bowel sounds are normal. She exhibits no distension and no mass. There is no tenderness. There is no rebound and no guarding. Musculoskeletal: She exhibits no edema. Neurological: She is alert and oriented to person, place, and time. Skin: Skin is warm. No erythema.        Assessment/Plan:    Fusobacterium bacteremia  pneumonia, cavitary, multilobar vs mass       Path pend      Cont meropenem

## 2019-06-03 ENCOUNTER — APPOINTMENT (OUTPATIENT)
Dept: INTERVENTIONAL RADIOLOGY/VASCULAR | Age: 39
DRG: 204 | End: 2019-06-03
Attending: INTERNAL MEDICINE
Payer: COMMERCIAL

## 2019-06-03 VITALS
BODY MASS INDEX: 40.52 KG/M2 | SYSTOLIC BLOOD PRESSURE: 126 MMHG | TEMPERATURE: 98.6 F | OXYGEN SATURATION: 96 % | HEART RATE: 92 BPM | HEIGHT: 71 IN | RESPIRATION RATE: 17 BRPM | DIASTOLIC BLOOD PRESSURE: 63 MMHG | WEIGHT: 289.4 LBS

## 2019-06-03 LAB
ANION GAP SERPL CALCULATED.3IONS-SCNC: 10 MEQ/L (ref 9–15)
BASOPHILS ABSOLUTE: 0.1 K/UL (ref 0–0.2)
BASOPHILS RELATIVE PERCENT: 1.1 %
BUN BLDV-MCNC: 12 MG/DL (ref 6–20)
CALCIUM SERPL-MCNC: 9 MG/DL (ref 8.5–9.9)
CHLORIDE BLD-SCNC: 102 MEQ/L (ref 95–107)
CO2: 27 MEQ/L (ref 20–31)
CREAT SERPL-MCNC: 0.57 MG/DL (ref 0.5–0.9)
CULTURE, RESPIRATORY: NORMAL
EOSINOPHILS ABSOLUTE: 0.2 K/UL (ref 0–0.7)
EOSINOPHILS RELATIVE PERCENT: 2.4 %
GFR AFRICAN AMERICAN: >60
GFR NON-AFRICAN AMERICAN: >60
GLUCOSE BLD-MCNC: 161 MG/DL (ref 70–99)
HCT VFR BLD CALC: 34.2 % (ref 37–47)
HEMOGLOBIN: 11.6 G/DL (ref 12–16)
LYMPHOCYTES ABSOLUTE: 1.3 K/UL (ref 1–4.8)
LYMPHOCYTES RELATIVE PERCENT: 14.8 %
MCH RBC QN AUTO: 27.8 PG (ref 27–31.3)
MCHC RBC AUTO-ENTMCNC: 34 % (ref 33–37)
MCV RBC AUTO: 81.9 FL (ref 82–100)
MONOCYTES ABSOLUTE: 0.5 K/UL (ref 0.2–0.8)
MONOCYTES RELATIVE PERCENT: 6.1 %
NEUTROPHILS ABSOLUTE: 6.5 K/UL (ref 1.4–6.5)
NEUTROPHILS RELATIVE PERCENT: 75.6 %
PDW BLD-RTO: 15.5 % (ref 11.5–14.5)
PLATELET # BLD: 282 K/UL (ref 130–400)
POTASSIUM REFLEX MAGNESIUM: 4.5 MEQ/L (ref 3.4–4.9)
RBC # BLD: 4.18 M/UL (ref 4.2–5.4)
SODIUM BLD-SCNC: 139 MEQ/L (ref 135–144)
WBC # BLD: 8.6 K/UL (ref 4.8–10.8)

## 2019-06-03 PROCEDURE — 36415 COLL VENOUS BLD VENIPUNCTURE: CPT

## 2019-06-03 PROCEDURE — 99232 SBSQ HOSP IP/OBS MODERATE 35: CPT | Performed by: INTERNAL MEDICINE

## 2019-06-03 PROCEDURE — 2709999900 IR PICC WO SQ PORT/PUMP > 5 YEARS

## 2019-06-03 PROCEDURE — 2580000003 HC RX 258: Performed by: INTERNAL MEDICINE

## 2019-06-03 PROCEDURE — 2580000003 HC RX 258: Performed by: NURSE PRACTITIONER

## 2019-06-03 PROCEDURE — 85025 COMPLETE CBC W/AUTO DIFF WBC: CPT

## 2019-06-03 PROCEDURE — 36573 INSJ PICC RS&I 5 YR+: CPT

## 2019-06-03 PROCEDURE — 2500000003 HC RX 250 WO HCPCS: Performed by: INTERNAL MEDICINE

## 2019-06-03 PROCEDURE — 6360000002 HC RX W HCPCS: Performed by: INTERNAL MEDICINE

## 2019-06-03 PROCEDURE — 80048 BASIC METABOLIC PNL TOTAL CA: CPT

## 2019-06-03 RX ORDER — SODIUM CHLORIDE 9 MG/ML
250 INJECTION, SOLUTION INTRAVENOUS ONCE
Status: COMPLETED | OUTPATIENT
Start: 2019-06-03 | End: 2019-06-03

## 2019-06-03 RX ORDER — LIDOCAINE HYDROCHLORIDE 20 MG/ML
5 INJECTION, SOLUTION INFILTRATION; PERINEURAL ONCE
Status: COMPLETED | OUTPATIENT
Start: 2019-06-03 | End: 2019-06-03

## 2019-06-03 RX ORDER — SODIUM CHLORIDE 0.9 % (FLUSH) 0.9 %
10 SYRINGE (ML) INJECTION PRN
Status: DISCONTINUED | OUTPATIENT
Start: 2019-06-03 | End: 2019-06-03 | Stop reason: HOSPADM

## 2019-06-03 RX ORDER — SODIUM CHLORIDE 0.9 % (FLUSH) 0.9 %
10 SYRINGE (ML) INJECTION EVERY 12 HOURS SCHEDULED
Status: DISCONTINUED | OUTPATIENT
Start: 2019-06-03 | End: 2019-06-03 | Stop reason: HOSPADM

## 2019-06-03 RX ADMIN — LIDOCAINE HYDROCHLORIDE 5 ML: 20 INJECTION, SOLUTION INFILTRATION; PERINEURAL at 17:18

## 2019-06-03 RX ADMIN — SODIUM CHLORIDE 250 ML: 9 INJECTION, SOLUTION INTRAVENOUS at 17:18

## 2019-06-03 RX ADMIN — Medication 10 ML: at 08:46

## 2019-06-03 RX ADMIN — MEROPENEM 1 G: 1 INJECTION, POWDER, FOR SOLUTION INTRAVENOUS at 00:01

## 2019-06-03 RX ADMIN — MEROPENEM 1 G: 1 INJECTION, POWDER, FOR SOLUTION INTRAVENOUS at 08:45

## 2019-06-03 RX ADMIN — MEROPENEM 1 G: 1 INJECTION, POWDER, FOR SOLUTION INTRAVENOUS at 16:06

## 2019-06-03 ASSESSMENT — PAIN SCALES - GENERAL: PAINLEVEL_OUTOF10: 0

## 2019-06-03 NOTE — DISCHARGE SUMMARY
Jerry Haile
directed into the right mainstem bronchus. CONCLUSION: UNREMARKABLE DOCUMENTATION OF BRONCHOSCOPY X-RAY DOSE SUMMARY: 3 FLUOROSCOPIC IMAGES SAVED TO PACS. 0SPOT FILM WAS EXPOSED. 76.8SECOND FLUOROSCOPY TIME. 17.53MGY CUMULATIVE X-RAY DOSE. Discharge Medications:       Marquis Yeung   Home Medication Instructions PAOLA:[id number]    Printed on:06/03/19 1022   Medication Information                      citalopram (CELEXA) 10 MG tablet  Take 10 mg by mouth daily                 Disposition:   If discharged to Home, Any [place] [address] needs that were indicated and/or required as been addressed and set up by Social Work. Condition at discharge: Pt was medically stable at the time of discharge. Activity: activity as tolerated    Total time taken for discharging this patient: 40 minutes. Greater than 70% of time was spent focused exclusively on this patient. Time was taken to review chart, discuss plans with consultants, reconciling medications, discussing plan answering questions with patient.      Comfort Almaguer  6/3/2019, 10:22 AM  ----------------------------------------------------------------------------------------------------------------------    Shanita Oliva

## 2019-06-03 NOTE — PROGRESS NOTES
Infectious Disease Progress Note    Patient informed that I am an ID physician on entry and permission obtained from the patient to speak in front of any visitors prior to any discussion for HIPPA purposes. 6/3/2019    Patient is a hospital followup regarding fusobacterium high grade bacteremia with hx of 3 months productive cough, weight loss of 40 lbs, decreased appetite and night sweats, multilobar pneumonia,   H/O endometrial cancer treated in 2015 with chemorx, RTX and ERICKA, with mets    Subjectively, no new complaints at this time. General: Patient in no acute distress, cooperative, obese  Skin: no new rashes   HEENT: EOMI, MMM, Neck is supple  Heart: S1 S2  Lungs:bilaterally clear for the most part. Some dry cough  Abdomen: soft, ND, +BS, NTTP  Extrem:+pulses, no calf pain  Neuro exam: CN II-XII intact      Lab Results   Component Value Date    WBC 8.6 06/03/2019    HGB 11.6 (L) 06/03/2019    HCT 34.2 (L) 06/03/2019    MCV 81.9 (L) 06/03/2019     06/03/2019     Lab Results   Component Value Date     06/03/2019    K 4.5 06/03/2019     06/03/2019    CO2 27 06/03/2019    BUN 12 06/03/2019    CREATININE 0.57 06/03/2019    GLUCOSE 161 06/03/2019    CALCIUM 9.0 06/03/2019        WBC trends are being monitored. Antibiotic doses are being adjusted per most recent renal labs.      Vitals:    06/03/19 0734   BP: 126/63   Pulse: 92   Resp: 17   Temp: 98.6 °F (37 °C)   SpO2: 96%           Patient Active Problem List   Diagnosis    Lung mass    H/O: hysterectomy    Cough    Abnormal vaginal bleeding    Cavitating mass in right middle lung lobe    Endometrial cancer (HCC)    Hemoptysis    Bacteremia    Fusobacterium infection    Cavitary pneumonia           ASSESSMENT:  fusobacterium high grade bacteremia with hx of 3 months productive cough, weight loss of 40 lbs, decreased appetite and night sweats, multilobar pneumonia,   H/O endometrial cancer treated in 2015 with chemorx, RTX and ERICKA, with mets      PLAN:  Patient will be going to outpatient infusions. Change Meropenem to Invanz on discharge. Maintain abx x 2-3 weeks past 5/31 ( date of clearance )  Script in chart. Imaging and labs were reviewed per medical records and any ID pertinent labs were addressed with the patient.        Patti Garcia DO'

## 2019-06-03 NOTE — PROGRESS NOTES
Per Dr. Jean Carlos Gregory via [employer], patient ok for discharge after PICC line today from pulmonology standpoint.

## 2019-06-03 NOTE — PROGRESS NOTES
INPATIENT PROGRESS NOTES    PATIENT NAME: Len Alejandro  MRN: [id number]  SERVICE DATE:  Suma 3, 2019   SERVICE TIME:  12:46 PM      PRIMARY SERVICE: Pulmonary Disease    CHIEF COMPLAINTS: Mild cough, no hemoptysis    INTERVAL HPI: Patient seen and examined at bedside, Interval Notes, orders reviewed. Nursing notes noted  Patient reports no significant active symptoms other than mild periodic cough, no pleuritic pain, hemoptysis, shortness breath, fevers, or other complaints. She remains well oxygenated on room air. OBJECTIVE    Body mass index is 40.36 kg/m². PHYSICAL EXAM:  Vitals:  /63   Pulse 92   Temp 98.6 °F (37 °C) (Oral)   Resp 17   Ht 5' 11\" (1.803 m)   Wt 289 lb 6.4 oz (131.3 kg)   SpO2 96%   BMI 40.36 kg/m²   General: Patient is  Alert, awake . comfortable in bed, No distress. Head: Atraumatic , Normocephalic   Eyes: PERRL. No icteric sclera. No conjunctival injection. No discharge   ENT: No nasal  discharge. Pharynx clear. Neck:  Trachea midline. No thyromegaly, no JVD, No cervical adenopathy. Chest : Adequate effort, symmetric bilateral excursions  Lung : Adequate breath sounds bilaterally, clear otherwise  Heart[de-identified] Regular rhythm and rate. No mumur ,  Rub or gallop  ABD: Benign. Non-tender. Non-distended. No masses or organmegaly. Normal bowel sounds. EXT: Trace Pitting edema both lower extremities , No Cyanosis No clubbing  Neuro: no focal weakness  Skin: Warm and dry. No erythema or rash on exposed extremities. DATA:   Recent Labs     06/02/19  0723 06/03/19  0622   WBC 8.1 8.6   HGB 12.2 11.6*   HCT 35.6* 34.2*   MCV 82.9 81.9*    282     Recent Labs     06/02/19  0723 06/03/19  0622    139   K 4.2 4.5    102   CO2 27 27   BUN 13 12   CREATININE 0.60 0.57   GLUCOSE 150* 161*   CALCIUM 8.7 9.0   LABGLOM >60.0 >60.0   GFRAA >60.0 >60.0       No results for input(s): PHART, BWX3NIB, PO2ART, PJP6FGS, BEART, K0YUHGYK in the last 72 hours.     O2 Device: into the right mainstem bronchus. CONCLUSION: UNREMARKABLE DOCUMENTATION OF BRONCHOSCOPY X-RAY DOSE SUMMARY: 3 FLUOROSCOPIC IMAGES SAVED TO PACS. 0SPOT FILM WAS EXPOSED. 76.8SECOND FLUOROSCOPY TIME. 17.53MGY CUMULATIVE X-RAY DOSE. IMPRESSION AND SUGGESTION:  1. Fusobacterium bacteremia, one out of 2 cultures on 2 occasions, May 25 and May 27, repeat cultures on the 31st negative. Patient does not appear to be symptomatic to suggest true bacteremia but has large necrotic masses which could be secondarily infected. Other sources cannot be excluded. Patient is status post multiple biopsies, awaiting pathology results  2.  History of uterine cancer with large bilateral lung masses now status post biopsies as mentioned above  Continue current treatment, awaiting pathology report, given the lack of true septic clinical signs and symptoms, completed course with oral antibiotic may be adequate, awaiting ID recommendations          Electronically signed by Jesika Sullivan MD, FCCP on 6/3/2019 at 12:46 PM

## 2019-06-03 NOTE — CARE COORDINATION
IV BENEFIT CHECK recd and placed on patients chart. Will not be needed as she will be going to the outpt infusion center. Set patient up for out patient infusion center. Appointment time @ 1:45. Discussed with patient, notation made to discharge instructions. Patient questions and concerns answered. PICC to be inserted this afternoon. Patient will discharge.   Electronically signed by Yovanny Grey RN on 6/3/2019 at 2:36 PM

## 2019-06-03 NOTE — PROGRESS NOTES
Per Dr. Ravi Round, ok for patient to be discharged after she gets her PICC line. She provided a script for invanz.   Awaiting PICC line which they said wouldn't happen until after 1500

## 2019-06-04 ENCOUNTER — HOSPITAL ENCOUNTER (OUTPATIENT)
Dept: INFUSION THERAPY | Age: 39
Setting detail: INFUSION SERIES
Discharge: HOME | End: 2019-06-04
Payer: COMMERCIAL

## 2019-06-04 VITALS
HEART RATE: 111 BPM | RESPIRATION RATE: 17 BRPM | DIASTOLIC BLOOD PRESSURE: 69 MMHG | TEMPERATURE: 99 F | SYSTOLIC BLOOD PRESSURE: 130 MMHG

## 2019-06-04 DIAGNOSIS — J18.9 CAVITARY PNEUMONIA: ICD-10-CM

## 2019-06-04 DIAGNOSIS — J98.4 CAVITARY PNEUMONIA: ICD-10-CM

## 2019-06-04 DIAGNOSIS — R78.81 BACTEREMIA: Primary | ICD-10-CM

## 2019-06-04 DIAGNOSIS — A49.8 FUSOBACTERIUM INFECTION: ICD-10-CM

## 2019-06-04 DIAGNOSIS — J98.4 CAVITATING MASS IN RIGHT MIDDLE LUNG LOBE: ICD-10-CM

## 2019-06-04 PROCEDURE — 96365 THER/PROPH/DIAG IV INF INIT: CPT

## 2019-06-04 PROCEDURE — 6360000002 HC RX W HCPCS: Performed by: INTERNAL MEDICINE

## 2019-06-04 PROCEDURE — 2580000003 HC RX 258: Performed by: INTERNAL MEDICINE

## 2019-06-04 RX ADMIN — SODIUM CHLORIDE 1000 MG: 900 INJECTION INTRAVENOUS at 14:04

## 2019-06-04 NOTE — FLOWSHEET NOTE
Patient completed her Invanz antibiotic; since this is her first treatment she will be waiting for 1 hour before going home, for any reaction. Sitting out in the waiting room.

## 2019-06-05 ENCOUNTER — HOSPITAL ENCOUNTER (OUTPATIENT)
Dept: INFUSION THERAPY | Age: 39
Setting detail: INFUSION SERIES
Discharge: HOME | End: 2019-06-05
Payer: COMMERCIAL

## 2019-06-05 ENCOUNTER — TELEPHONE (OUTPATIENT)
Dept: PULMONOLOGY | Age: 39
End: 2019-06-05

## 2019-06-05 VITALS
HEART RATE: 111 BPM | SYSTOLIC BLOOD PRESSURE: 116 MMHG | RESPIRATION RATE: 18 BRPM | DIASTOLIC BLOOD PRESSURE: 57 MMHG | TEMPERATURE: 98.6 F

## 2019-06-05 DIAGNOSIS — J18.9 CAVITARY PNEUMONIA: ICD-10-CM

## 2019-06-05 DIAGNOSIS — J98.4 CAVITARY PNEUMONIA: ICD-10-CM

## 2019-06-05 DIAGNOSIS — R78.81 BACTEREMIA: Primary | ICD-10-CM

## 2019-06-05 DIAGNOSIS — J98.4 CAVITATING MASS IN RIGHT MIDDLE LUNG LOBE: ICD-10-CM

## 2019-06-05 DIAGNOSIS — A49.8 FUSOBACTERIUM INFECTION: ICD-10-CM

## 2019-06-05 DIAGNOSIS — R91.8 LUNG MASS: Primary | ICD-10-CM

## 2019-06-05 LAB
BLOOD CULTURE, ROUTINE: NORMAL
CULTURE, BLOOD 2: NORMAL

## 2019-06-05 PROCEDURE — 6360000002 HC RX W HCPCS: Performed by: INTERNAL MEDICINE

## 2019-06-05 PROCEDURE — 2580000003 HC RX 258: Performed by: INTERNAL MEDICINE

## 2019-06-05 PROCEDURE — 96365 THER/PROPH/DIAG IV INF INIT: CPT

## 2019-06-05 RX ADMIN — SODIUM CHLORIDE 1000 MG: 900 INJECTION INTRAVENOUS at 14:04

## 2019-06-05 NOTE — TELEPHONE ENCOUNTER
Spoke with patient, biopsy is nondiagnostic, we will proceed with CT-guided biopsy, discussed with Dr. Lilibeth Goode lesion tendency to bleed discussed and will be watched for during procedure.

## 2019-06-05 NOTE — FLOWSHEET NOTE
Patient to the floor ambulatory for her iv invanz. Vital signs taken. Denies any discomfort. Call light within reach.

## 2019-06-06 ENCOUNTER — HOSPITAL ENCOUNTER (OUTPATIENT)
Dept: INFUSION THERAPY | Age: 39
Setting detail: INFUSION SERIES
Discharge: HOME | End: 2019-06-06
Payer: COMMERCIAL

## 2019-06-06 VITALS
DIASTOLIC BLOOD PRESSURE: 65 MMHG | TEMPERATURE: 98.4 F | HEART RATE: 101 BPM | RESPIRATION RATE: 16 BRPM | SYSTOLIC BLOOD PRESSURE: 126 MMHG

## 2019-06-06 DIAGNOSIS — R78.81 BACTEREMIA: Primary | ICD-10-CM

## 2019-06-06 DIAGNOSIS — A49.8 FUSOBACTERIUM INFECTION: ICD-10-CM

## 2019-06-06 DIAGNOSIS — J98.4 CAVITATING MASS IN RIGHT MIDDLE LUNG LOBE: ICD-10-CM

## 2019-06-06 DIAGNOSIS — J18.9 CAVITARY PNEUMONIA: ICD-10-CM

## 2019-06-06 DIAGNOSIS — J98.4 CAVITARY PNEUMONIA: ICD-10-CM

## 2019-06-06 PROCEDURE — 96365 THER/PROPH/DIAG IV INF INIT: CPT

## 2019-06-06 PROCEDURE — 6360000002 HC RX W HCPCS: Performed by: INTERNAL MEDICINE

## 2019-06-06 PROCEDURE — 2580000003 HC RX 258: Performed by: INTERNAL MEDICINE

## 2019-06-06 RX ADMIN — SODIUM CHLORIDE 1000 MG: 900 INJECTION INTRAVENOUS at 13:52

## 2019-06-07 ENCOUNTER — HOSPITAL ENCOUNTER (OUTPATIENT)
Dept: INFUSION THERAPY | Age: 39
Setting detail: INFUSION SERIES
Discharge: HOME | End: 2019-06-07
Payer: COMMERCIAL

## 2019-06-07 VITALS
HEART RATE: 103 BPM | TEMPERATURE: 98.8 F | RESPIRATION RATE: 16 BRPM | SYSTOLIC BLOOD PRESSURE: 119 MMHG | DIASTOLIC BLOOD PRESSURE: 61 MMHG

## 2019-06-07 DIAGNOSIS — J98.4 CAVITARY PNEUMONIA: ICD-10-CM

## 2019-06-07 DIAGNOSIS — J98.4 CAVITATING MASS IN RIGHT MIDDLE LUNG LOBE: ICD-10-CM

## 2019-06-07 DIAGNOSIS — A49.8 FUSOBACTERIUM INFECTION: ICD-10-CM

## 2019-06-07 DIAGNOSIS — J18.9 CAVITARY PNEUMONIA: ICD-10-CM

## 2019-06-07 DIAGNOSIS — R78.81 BACTEREMIA: Primary | ICD-10-CM

## 2019-06-07 PROCEDURE — 2580000003 HC RX 258: Performed by: INTERNAL MEDICINE

## 2019-06-07 PROCEDURE — 96365 THER/PROPH/DIAG IV INF INIT: CPT

## 2019-06-07 PROCEDURE — 6360000002 HC RX W HCPCS: Performed by: INTERNAL MEDICINE

## 2019-06-07 RX ADMIN — SODIUM CHLORIDE 1000 MG: 900 INJECTION INTRAVENOUS at 16:12

## 2019-06-07 NOTE — FLOWSHEET NOTE
Patient to the floor ambulatory for her iv antx. Vital signs taken. Denies any discomfort. Call light within reach.

## 2019-06-08 ENCOUNTER — HOSPITAL ENCOUNTER (OUTPATIENT)
Age: 39
Discharge: HOME | End: 2019-06-08
Attending: INTERNAL MEDICINE | Admitting: INTERNAL MEDICINE
Payer: COMMERCIAL

## 2019-06-08 DIAGNOSIS — R78.81 BACTEREMIA: Primary | ICD-10-CM

## 2019-06-08 DIAGNOSIS — J98.4 CAVITATING MASS IN RIGHT MIDDLE LUNG LOBE: ICD-10-CM

## 2019-06-08 DIAGNOSIS — J98.4 CAVITARY PNEUMONIA: ICD-10-CM

## 2019-06-08 DIAGNOSIS — J18.9 CAVITARY PNEUMONIA: ICD-10-CM

## 2019-06-08 DIAGNOSIS — A49.8 FUSOBACTERIUM INFECTION: ICD-10-CM

## 2019-06-08 PROCEDURE — 6360000002 HC RX W HCPCS: Performed by: INTERNAL MEDICINE

## 2019-06-08 PROCEDURE — 96365 THER/PROPH/DIAG IV INF INIT: CPT

## 2019-06-08 PROCEDURE — 2580000003 HC RX 258: Performed by: INTERNAL MEDICINE

## 2019-06-08 RX ADMIN — SODIUM CHLORIDE 1000 MG: 900 INJECTION INTRAVENOUS at 10:23

## 2019-06-09 ENCOUNTER — HOSPITAL ENCOUNTER (OUTPATIENT)
Age: 39
Discharge: HOME | End: 2019-06-09
Attending: INTERNAL MEDICINE | Admitting: INTERNAL MEDICINE
Payer: COMMERCIAL

## 2019-06-09 VITALS
DIASTOLIC BLOOD PRESSURE: 62 MMHG | OXYGEN SATURATION: 96 % | TEMPERATURE: 99 F | SYSTOLIC BLOOD PRESSURE: 118 MMHG | HEART RATE: 102 BPM

## 2019-06-09 DIAGNOSIS — J98.4 CAVITATING MASS IN RIGHT MIDDLE LUNG LOBE: ICD-10-CM

## 2019-06-09 DIAGNOSIS — A49.8 FUSOBACTERIUM INFECTION: ICD-10-CM

## 2019-06-09 DIAGNOSIS — J18.9 CAVITARY PNEUMONIA: ICD-10-CM

## 2019-06-09 DIAGNOSIS — R78.81 BACTEREMIA: Primary | ICD-10-CM

## 2019-06-09 DIAGNOSIS — J98.4 CAVITARY PNEUMONIA: ICD-10-CM

## 2019-06-09 PROCEDURE — 6360000002 HC RX W HCPCS: Performed by: INTERNAL MEDICINE

## 2019-06-09 PROCEDURE — 96374 THER/PROPH/DIAG INJ IV PUSH: CPT

## 2019-06-09 PROCEDURE — 2580000003 HC RX 258: Performed by: INTERNAL MEDICINE

## 2019-06-09 PROCEDURE — 96365 THER/PROPH/DIAG IV INF INIT: CPT

## 2019-06-09 RX ADMIN — SODIUM CHLORIDE 1000 MG: 900 INJECTION INTRAVENOUS at 10:51

## 2019-06-10 ENCOUNTER — HOSPITAL ENCOUNTER (OUTPATIENT)
Dept: INFUSION THERAPY | Age: 39
Setting detail: INFUSION SERIES
Discharge: HOME | End: 2019-06-10
Payer: COMMERCIAL

## 2019-06-10 VITALS
RESPIRATION RATE: 18 BRPM | DIASTOLIC BLOOD PRESSURE: 66 MMHG | HEART RATE: 110 BPM | SYSTOLIC BLOOD PRESSURE: 135 MMHG | TEMPERATURE: 97.9 F

## 2019-06-10 DIAGNOSIS — J98.4 CAVITARY PNEUMONIA: ICD-10-CM

## 2019-06-10 DIAGNOSIS — A49.8 FUSOBACTERIUM INFECTION: ICD-10-CM

## 2019-06-10 DIAGNOSIS — J98.4 CAVITATING MASS IN RIGHT MIDDLE LUNG LOBE: ICD-10-CM

## 2019-06-10 DIAGNOSIS — R78.81 BACTEREMIA: Primary | ICD-10-CM

## 2019-06-10 DIAGNOSIS — J18.9 CAVITARY PNEUMONIA: ICD-10-CM

## 2019-06-10 PROCEDURE — 6360000002 HC RX W HCPCS: Performed by: INTERNAL MEDICINE

## 2019-06-10 PROCEDURE — 2580000003 HC RX 258: Performed by: INTERNAL MEDICINE

## 2019-06-10 PROCEDURE — 96365 THER/PROPH/DIAG IV INF INIT: CPT

## 2019-06-10 RX ADMIN — SODIUM CHLORIDE 1000 MG: 900 INJECTION INTRAVENOUS at 13:36

## 2019-06-11 ENCOUNTER — HOSPITAL ENCOUNTER (OUTPATIENT)
Age: 39
Discharge: HOME | End: 2019-06-11
Attending: INTERNAL MEDICINE | Admitting: INTERNAL MEDICINE
Payer: COMMERCIAL

## 2019-06-11 DIAGNOSIS — A49.8 FUSOBACTERIUM INFECTION: ICD-10-CM

## 2019-06-11 DIAGNOSIS — J98.4 CAVITATING MASS IN RIGHT MIDDLE LUNG LOBE: ICD-10-CM

## 2019-06-11 DIAGNOSIS — J98.4 CAVITARY PNEUMONIA: ICD-10-CM

## 2019-06-11 DIAGNOSIS — J18.9 CAVITARY PNEUMONIA: ICD-10-CM

## 2019-06-11 DIAGNOSIS — R78.81 BACTEREMIA: Primary | ICD-10-CM

## 2019-06-11 PROCEDURE — 96365 THER/PROPH/DIAG IV INF INIT: CPT

## 2019-06-11 PROCEDURE — 2580000003 HC RX 258: Performed by: INTERNAL MEDICINE

## 2019-06-11 PROCEDURE — 6360000002 HC RX W HCPCS: Performed by: INTERNAL MEDICINE

## 2019-06-11 RX ADMIN — SODIUM CHLORIDE 1000 MG: 900 INJECTION INTRAVENOUS at 17:46

## 2019-06-11 NOTE — FLOWSHEET NOTE
Infusion complete. Picc flushed. New sterile cap placed. Pt ambulatory to main lobby with belongings. Electronically signed by Lyndsay Grant.  Keyla Bridges on 6/11/2019 at 6:22 PM

## 2019-06-11 NOTE — FLOWSHEET NOTE
Picc f/p.  +BR. Citlaly Jasso started. Pt has no needs at this time. Call light within reach. Electronically signed by Pauline Cisneros.  Richard Mueller on 6/11/2019 at 5:49 PM

## 2019-06-12 ENCOUNTER — HOSPITAL ENCOUNTER (OUTPATIENT)
Dept: INFUSION THERAPY | Age: 39
Setting detail: INFUSION SERIES
Discharge: HOME | End: 2019-06-12
Payer: COMMERCIAL

## 2019-06-12 VITALS
TEMPERATURE: 98.5 F | SYSTOLIC BLOOD PRESSURE: 123 MMHG | OXYGEN SATURATION: 98 % | HEART RATE: 103 BPM | RESPIRATION RATE: 18 BRPM | DIASTOLIC BLOOD PRESSURE: 69 MMHG

## 2019-06-12 DIAGNOSIS — A49.8 FUSOBACTERIUM INFECTION: ICD-10-CM

## 2019-06-12 DIAGNOSIS — J98.4 CAVITARY PNEUMONIA: ICD-10-CM

## 2019-06-12 DIAGNOSIS — R78.81 BACTEREMIA: Primary | ICD-10-CM

## 2019-06-12 DIAGNOSIS — J98.4 CAVITATING MASS IN RIGHT MIDDLE LUNG LOBE: ICD-10-CM

## 2019-06-12 DIAGNOSIS — J18.9 CAVITARY PNEUMONIA: ICD-10-CM

## 2019-06-12 PROCEDURE — 2580000003 HC RX 258: Performed by: INTERNAL MEDICINE

## 2019-06-12 PROCEDURE — 96365 THER/PROPH/DIAG IV INF INIT: CPT

## 2019-06-12 PROCEDURE — 6360000002 HC RX W HCPCS: Performed by: INTERNAL MEDICINE

## 2019-06-12 RX ADMIN — SODIUM CHLORIDE 1000 MG: 900 INJECTION INTRAVENOUS at 14:05

## 2019-06-12 NOTE — PROGRESS NOTES
IV complete. Patient tolerated well. PICC flushed and new cap applied. Left ambulatory.      Electronically signed by Izora Buerger, RN on 6/12/2019 at 3:52 PM

## 2019-06-13 ENCOUNTER — HOSPITAL ENCOUNTER (OUTPATIENT)
Dept: INFUSION THERAPY | Age: 39
Setting detail: INFUSION SERIES
Discharge: HOME | End: 2019-06-13
Payer: COMMERCIAL

## 2019-06-13 VITALS
RESPIRATION RATE: 17 BRPM | DIASTOLIC BLOOD PRESSURE: 64 MMHG | TEMPERATURE: 98.7 F | HEART RATE: 108 BPM | SYSTOLIC BLOOD PRESSURE: 123 MMHG

## 2019-06-13 DIAGNOSIS — R78.81 BACTEREMIA: Primary | ICD-10-CM

## 2019-06-13 DIAGNOSIS — J18.9 CAVITARY PNEUMONIA: ICD-10-CM

## 2019-06-13 DIAGNOSIS — A49.8 FUSOBACTERIUM INFECTION: ICD-10-CM

## 2019-06-13 DIAGNOSIS — J98.4 CAVITARY PNEUMONIA: ICD-10-CM

## 2019-06-13 DIAGNOSIS — J98.4 CAVITATING MASS IN RIGHT MIDDLE LUNG LOBE: ICD-10-CM

## 2019-06-13 PROCEDURE — 96365 THER/PROPH/DIAG IV INF INIT: CPT

## 2019-06-13 PROCEDURE — 6360000002 HC RX W HCPCS: Performed by: INTERNAL MEDICINE

## 2019-06-13 PROCEDURE — 2580000003 HC RX 258: Performed by: INTERNAL MEDICINE

## 2019-06-13 RX ADMIN — SODIUM CHLORIDE 1000 MG: 900 INJECTION INTRAVENOUS at 09:29

## 2019-06-13 NOTE — FLOWSHEET NOTE
Ambulatory patient came in for [facility] treatment. Vitals taken. Callbell in reach. Sitting quietly in chair.

## 2019-06-13 NOTE — FLOWSHEET NOTE
Patient treatment completed; right Piccline dressing changed. Dual port picc flushed and patent with both good blood returned. Ambulated out.

## 2019-06-14 ENCOUNTER — HOSPITAL ENCOUNTER (OUTPATIENT)
Dept: INFUSION THERAPY | Age: 39
Setting detail: INFUSION SERIES
Discharge: HOME | End: 2019-06-14
Payer: COMMERCIAL

## 2019-06-14 VITALS
HEART RATE: 101 BPM | SYSTOLIC BLOOD PRESSURE: 124 MMHG | TEMPERATURE: 98.8 F | DIASTOLIC BLOOD PRESSURE: 73 MMHG | RESPIRATION RATE: 16 BRPM | OXYGEN SATURATION: 98 %

## 2019-06-14 DIAGNOSIS — R78.81 BACTEREMIA: Primary | ICD-10-CM

## 2019-06-14 DIAGNOSIS — J98.4 CAVITARY PNEUMONIA: ICD-10-CM

## 2019-06-14 DIAGNOSIS — J18.9 CAVITARY PNEUMONIA: ICD-10-CM

## 2019-06-14 DIAGNOSIS — J98.4 CAVITATING MASS IN RIGHT MIDDLE LUNG LOBE: ICD-10-CM

## 2019-06-14 DIAGNOSIS — A49.8 FUSOBACTERIUM INFECTION: ICD-10-CM

## 2019-06-14 PROCEDURE — 96365 THER/PROPH/DIAG IV INF INIT: CPT

## 2019-06-14 PROCEDURE — 2580000003 HC RX 258: Performed by: INTERNAL MEDICINE

## 2019-06-14 PROCEDURE — 6360000002 HC RX W HCPCS: Performed by: INTERNAL MEDICINE

## 2019-06-14 RX ADMIN — SODIUM CHLORIDE 1000 MG: 900 INJECTION INTRAVENOUS at 18:17

## 2019-06-15 ENCOUNTER — HOSPITAL ENCOUNTER (OUTPATIENT)
Age: 39
Discharge: HOME | End: 2019-06-15
Attending: INTERNAL MEDICINE | Admitting: INTERNAL MEDICINE
Payer: COMMERCIAL

## 2019-06-15 DIAGNOSIS — R78.81 BACTEREMIA: Primary | ICD-10-CM

## 2019-06-15 DIAGNOSIS — J98.4 CAVITATING MASS IN RIGHT MIDDLE LUNG LOBE: ICD-10-CM

## 2019-06-15 DIAGNOSIS — J18.9 CAVITARY PNEUMONIA: ICD-10-CM

## 2019-06-15 DIAGNOSIS — A49.8 FUSOBACTERIUM INFECTION: ICD-10-CM

## 2019-06-15 DIAGNOSIS — J98.4 CAVITARY PNEUMONIA: ICD-10-CM

## 2019-06-15 PROCEDURE — 6360000002 HC RX W HCPCS: Performed by: INTERNAL MEDICINE

## 2019-06-15 PROCEDURE — 2580000003 HC RX 258: Performed by: INTERNAL MEDICINE

## 2019-06-15 PROCEDURE — 96365 THER/PROPH/DIAG IV INF INIT: CPT

## 2019-06-15 RX ADMIN — SODIUM CHLORIDE 1000 MG: 900 INJECTION INTRAVENOUS at 12:52

## 2019-06-16 ENCOUNTER — HOSPITAL ENCOUNTER (OUTPATIENT)
Age: 39
Discharge: HOME | End: 2019-06-16
Attending: INTERNAL MEDICINE | Admitting: INTERNAL MEDICINE
Payer: COMMERCIAL

## 2019-06-16 DIAGNOSIS — R78.81 BACTEREMIA: Primary | ICD-10-CM

## 2019-06-16 DIAGNOSIS — A49.8 FUSOBACTERIUM INFECTION: ICD-10-CM

## 2019-06-16 DIAGNOSIS — J98.4 CAVITARY PNEUMONIA: ICD-10-CM

## 2019-06-16 DIAGNOSIS — J98.4 CAVITATING MASS IN RIGHT MIDDLE LUNG LOBE: ICD-10-CM

## 2019-06-16 DIAGNOSIS — J18.9 CAVITARY PNEUMONIA: ICD-10-CM

## 2019-06-16 PROCEDURE — 6360000002 HC RX W HCPCS: Performed by: INTERNAL MEDICINE

## 2019-06-16 PROCEDURE — 2580000003 HC RX 258: Performed by: INTERNAL MEDICINE

## 2019-06-16 PROCEDURE — 96365 THER/PROPH/DIAG IV INF INIT: CPT

## 2019-06-16 RX ADMIN — SODIUM CHLORIDE 1000 MG: 900 INJECTION INTRAVENOUS at 12:35

## 2019-06-17 ENCOUNTER — INITIAL CONSULT (OUTPATIENT)
Dept: INTERVENTIONAL RADIOLOGY/VASCULAR | Age: 39
End: 2019-06-17
Payer: COMMERCIAL

## 2019-06-17 ENCOUNTER — HOSPITAL ENCOUNTER (OUTPATIENT)
Dept: INFUSION THERAPY | Age: 39
Setting detail: INFUSION SERIES
Discharge: HOME | End: 2019-06-17
Payer: COMMERCIAL

## 2019-06-17 ENCOUNTER — TELEPHONE (OUTPATIENT)
Dept: INTERVENTIONAL RADIOLOGY/VASCULAR | Age: 39
End: 2019-06-17

## 2019-06-17 VITALS
DIASTOLIC BLOOD PRESSURE: 60 MMHG | BODY MASS INDEX: 42.04 KG/M2 | SYSTOLIC BLOOD PRESSURE: 120 MMHG | HEART RATE: 98 BPM | RESPIRATION RATE: 14 BRPM | OXYGEN SATURATION: 95 % | WEIGHT: 293 LBS

## 2019-06-17 VITALS
TEMPERATURE: 98.2 F | HEART RATE: 95 BPM | SYSTOLIC BLOOD PRESSURE: 145 MMHG | RESPIRATION RATE: 16 BRPM | DIASTOLIC BLOOD PRESSURE: 69 MMHG

## 2019-06-17 DIAGNOSIS — J98.4 CAVITATING MASS IN RIGHT MIDDLE LUNG LOBE: ICD-10-CM

## 2019-06-17 DIAGNOSIS — J98.4 CAVITATING MASS IN RIGHT MIDDLE LUNG LOBE: Primary | ICD-10-CM

## 2019-06-17 DIAGNOSIS — C54.1 ENDOMETRIAL CANCER (HCC): ICD-10-CM

## 2019-06-17 DIAGNOSIS — A49.8 FUSOBACTERIUM INFECTION: ICD-10-CM

## 2019-06-17 DIAGNOSIS — J18.9 CAVITARY PNEUMONIA: ICD-10-CM

## 2019-06-17 DIAGNOSIS — J98.4 CAVITARY PNEUMONIA: ICD-10-CM

## 2019-06-17 DIAGNOSIS — R78.81 BACTEREMIA: Primary | ICD-10-CM

## 2019-06-17 LAB
INR BLD: 1
PROTHROMBIN TIME: 9.7 SEC (ref 9–11.5)

## 2019-06-17 PROCEDURE — 6360000002 HC RX W HCPCS: Performed by: INTERNAL MEDICINE

## 2019-06-17 PROCEDURE — 96375 TX/PRO/DX INJ NEW DRUG ADDON: CPT

## 2019-06-17 PROCEDURE — 2580000003 HC RX 258: Performed by: INTERNAL MEDICINE

## 2019-06-17 PROCEDURE — 99242 OFF/OP CONSLTJ NEW/EST SF 20: CPT | Performed by: NURSE PRACTITIONER

## 2019-06-17 PROCEDURE — 96365 THER/PROPH/DIAG IV INF INIT: CPT

## 2019-06-17 RX ADMIN — SODIUM CHLORIDE 1000 MG: 900 INJECTION INTRAVENOUS at 09:57

## 2019-06-17 ASSESSMENT — ENCOUNTER SYMPTOMS
ABDOMINAL PAIN: 0
EYE PAIN: 0
EYE REDNESS: 0
NAUSEA: 1
DIARRHEA: 1
SINUS PRESSURE: 0
SORE THROAT: 0
WHEEZING: 0
TROUBLE SWALLOWING: 0
CONSTIPATION: 0
EYE ITCHING: 0
ABDOMINAL DISTENTION: 0
BACK PAIN: 0
VOMITING: 0
COUGH: 1
SINUS PAIN: 0
SHORTNESS OF BREATH: 1
EYE DISCHARGE: 0

## 2019-06-17 NOTE — PROGRESS NOTES
Sandra Montgomery, a female of 46 y.o. came to the office 2019. Chief Complaint   Patient presents with    Pulmonary Nodule     Pt here for consult for lung biopsy. Pt states Dr. Gerard Beavers attempted to do a lung biopsy a few weeks ago however, the results found necrotic tissue. Pt was referred by Dr. Gerard Beavers       HPI: Patient presents to clinic as referral for consultation from Pulmonology  for evaluation of bilateral pulmonary masses and requesting CT Guided Needle Biopsy. H/O endometrial CA .      Family History   Problem Relation Age of Onset    Cancer Mother         lymphoma    Diabetes Mother     No Known Problems Father     Diabetes Maternal Grandmother         questionable female cancer    Heart Disease Maternal Grandmother     Cancer Paternal Grandfather         stomach       Past Surgical History:   Procedure Laterality Date    BRONCHOSCOPY N/A 2019    BRONCHOSCOPY performed by Alfredo Gould MD at [address] [place] [address]  10/2015        Past Medical History:   Diagnosis Date    Endometrial cancer ([facility] [address] 2015    chemo and radiation    Prolonged emergence from general anesthesia 2015       Social History     Socioeconomic History    Marital status: Single     Spouse name: None    Number of children: None    Years of education: None    Highest education level: None   Occupational History    None   Social Needs    Financial resource strain: None    Food insecurity:     Worry: None     Inability: None    Transportation needs:     Medical: None     Non-medical: None   Tobacco Use    Smoking status: Former Smoker     Types: Cigarettes     Last attempt to quit: 2015     Years since quittin.4    Smokeless tobacco: Never Used   Substance and Sexual Activity    Alcohol use: Yes     Frequency: Never     Comment: socially    Drug use: Never    Sexual activity: Not Currently   Lifestyle    Physical activity:     Days per week: None     Minutes per session: None   

## 2019-06-17 NOTE — PROGRESS NOTES
Pt's vitals are stable, no complaints of pain. PICC flushes well with brisk blood return. Waiting on infusion.

## 2019-06-17 NOTE — TELEPHONE ENCOUNTER
Pt aware  Of procedure  On [email or web address] and to check in at radiology at 8 am.    follup with maik is scheduled  On [email or web address]

## 2019-06-18 ENCOUNTER — HOSPITAL ENCOUNTER (OUTPATIENT)
Age: 39
Discharge: HOME | End: 2019-06-18
Attending: INTERNAL MEDICINE | Admitting: INTERNAL MEDICINE
Payer: COMMERCIAL

## 2019-06-18 DIAGNOSIS — R78.81 BACTEREMIA: Primary | ICD-10-CM

## 2019-06-18 DIAGNOSIS — A49.8 FUSOBACTERIUM INFECTION: ICD-10-CM

## 2019-06-18 DIAGNOSIS — J98.4 CAVITATING MASS IN RIGHT MIDDLE LUNG LOBE: ICD-10-CM

## 2019-06-18 DIAGNOSIS — J98.4 CAVITARY PNEUMONIA: ICD-10-CM

## 2019-06-18 DIAGNOSIS — J18.9 CAVITARY PNEUMONIA: ICD-10-CM

## 2019-06-18 PROCEDURE — 6360000002 HC RX W HCPCS: Performed by: INTERNAL MEDICINE

## 2019-06-18 PROCEDURE — 96374 THER/PROPH/DIAG INJ IV PUSH: CPT

## 2019-06-18 PROCEDURE — 2580000003 HC RX 258: Performed by: INTERNAL MEDICINE

## 2019-06-18 RX ADMIN — SODIUM CHLORIDE 1000 MG: 900 INJECTION INTRAVENOUS at 18:41

## 2019-06-24 ENCOUNTER — HOSPITAL ENCOUNTER (OUTPATIENT)
Dept: INFUSION THERAPY | Age: 39
Setting detail: INFUSION SERIES
Discharge: HOME | End: 2019-06-24
Payer: COMMERCIAL

## 2019-06-24 PROBLEM — R05.9 COUGH: Status: RESOLVED | Noted: 2019-05-25 | Resolved: 2019-06-24

## 2019-06-24 PROCEDURE — 99211 OFF/OP EST MAY X REQ PHY/QHP: CPT

## 2019-06-24 RX ORDER — SODIUM CHLORIDE 0.9 % (FLUSH) 0.9 %
20 SYRINGE (ML) INJECTION PRN
Status: CANCELLED | OUTPATIENT
Start: 2019-06-24

## 2019-06-24 RX ORDER — SODIUM CHLORIDE 0.9 % (FLUSH) 0.9 %
10 SYRINGE (ML) INJECTION PRN
Status: CANCELLED | OUTPATIENT
Start: 2019-06-24

## 2019-06-25 ENCOUNTER — PREP FOR PROCEDURE (OUTPATIENT)
Dept: INTERVENTIONAL RADIOLOGY/VASCULAR | Age: 39
End: 2019-06-25

## 2019-06-25 RX ORDER — MIDAZOLAM HYDROCHLORIDE 1 MG/ML
2 INJECTION INTRAMUSCULAR; INTRAVENOUS
Status: CANCELLED | OUTPATIENT
Start: 2019-06-25

## 2019-06-25 RX ORDER — FENTANYL CITRATE 50 UG/ML
50 INJECTION, SOLUTION INTRAMUSCULAR; INTRAVENOUS
Status: CANCELLED | OUTPATIENT
Start: 2019-06-25

## 2019-06-25 RX ORDER — 0.9 % SODIUM CHLORIDE 0.9 %
250 INTRAVENOUS SOLUTION INTRAVENOUS
Status: CANCELLED | OUTPATIENT
Start: 2019-06-25

## 2019-06-25 RX ORDER — ONDANSETRON 2 MG/ML
4 INJECTION INTRAMUSCULAR; INTRAVENOUS
Status: CANCELLED | OUTPATIENT
Start: 2019-06-25 | End: 2019-06-25

## 2019-06-25 RX ORDER — LIDOCAINE HYDROCHLORIDE 20 MG/ML
5 INJECTION, SOLUTION INFILTRATION; PERINEURAL
Status: CANCELLED | OUTPATIENT
Start: 2019-06-25

## 2019-06-25 RX ORDER — IBUPROFEN 200 MG
TABLET ORAL ONCE
Status: CANCELLED | OUTPATIENT
Start: 2019-06-25 | End: 2019-06-25

## 2019-06-26 ENCOUNTER — HOSPITAL ENCOUNTER (OUTPATIENT)
Dept: CT IMAGING | Age: 39
Discharge: HOME | End: 2019-06-28
Payer: COMMERCIAL

## 2019-06-26 ENCOUNTER — HOSPITAL ENCOUNTER (OUTPATIENT)
Dept: GENERAL RADIOLOGY | Age: 39
Discharge: HOME | End: 2019-06-28
Payer: COMMERCIAL

## 2019-06-26 VITALS
DIASTOLIC BLOOD PRESSURE: 64 MMHG | HEIGHT: 71 IN | SYSTOLIC BLOOD PRESSURE: 120 MMHG | WEIGHT: 293 LBS | BODY MASS INDEX: 41.02 KG/M2 | RESPIRATION RATE: 10 BRPM | HEART RATE: 102 BPM | OXYGEN SATURATION: 93 % | TEMPERATURE: 98.7 F

## 2019-06-26 DIAGNOSIS — C54.1 ENDOMETRIAL CANCER (HCC): ICD-10-CM

## 2019-06-26 DIAGNOSIS — J98.4 CAVITATING MASS IN RIGHT MIDDLE LUNG LOBE: ICD-10-CM

## 2019-06-26 PROCEDURE — 6360000002 HC RX W HCPCS: Performed by: NURSE PRACTITIONER

## 2019-06-26 PROCEDURE — 2580000003 HC RX 258: Performed by: NURSE PRACTITIONER

## 2019-06-26 PROCEDURE — 87205 SMEAR GRAM STAIN: CPT

## 2019-06-26 PROCEDURE — 88305 TISSUE EXAM BY PATHOLOGIST: CPT

## 2019-06-26 PROCEDURE — 32405 CT NEEDLE BIOPSY LUNG PERCUTANEOUS: CPT | Performed by: RADIOLOGY

## 2019-06-26 PROCEDURE — 77012 CT SCAN FOR NEEDLE BIOPSY: CPT | Performed by: RADIOLOGY

## 2019-06-26 PROCEDURE — 71046 X-RAY EXAM CHEST 2 VIEWS: CPT | Performed by: RADIOLOGY

## 2019-06-26 PROCEDURE — 6370000000 HC RX 637 (ALT 250 FOR IP): Performed by: NURSE PRACTITIONER

## 2019-06-26 PROCEDURE — 2500000003 HC RX 250 WO HCPCS: Performed by: RADIOLOGY

## 2019-06-26 PROCEDURE — 77012 CT SCAN FOR NEEDLE BIOPSY: CPT

## 2019-06-26 PROCEDURE — 32405 CT NEEDLE BIOPSY LUNG PERCUTANEOUS: CPT

## 2019-06-26 PROCEDURE — 87075 CULTR BACTERIA EXCEPT BLOOD: CPT

## 2019-06-26 PROCEDURE — 71046 X-RAY EXAM CHEST 2 VIEWS: CPT

## 2019-06-26 PROCEDURE — 87070 CULTURE OTHR SPECIMN AEROBIC: CPT

## 2019-06-26 RX ORDER — BACITRACIN, NEOMYCIN, POLYMYXIN B 400; 3.5; 5 [USP'U]/G; MG/G; [USP'U]/G
OINTMENT TOPICAL ONCE
Status: COMPLETED | OUTPATIENT
Start: 2019-06-26 | End: 2019-06-26

## 2019-06-26 RX ORDER — ONDANSETRON 2 MG/ML
4 INJECTION INTRAMUSCULAR; INTRAVENOUS
Status: ACTIVE | OUTPATIENT
Start: 2019-06-26 | End: 2019-06-26

## 2019-06-26 RX ORDER — 0.9 % SODIUM CHLORIDE 0.9 %
250 INTRAVENOUS SOLUTION INTRAVENOUS
Status: DISCONTINUED | OUTPATIENT
Start: 2019-06-26 | End: 2019-06-29 | Stop reason: HOSPADM

## 2019-06-26 RX ORDER — LIDOCAINE HYDROCHLORIDE 20 MG/ML
5 INJECTION, SOLUTION INFILTRATION; PERINEURAL
Status: DISCONTINUED | OUTPATIENT
Start: 2019-06-26 | End: 2019-06-29 | Stop reason: HOSPADM

## 2019-06-26 RX ORDER — MIDAZOLAM HYDROCHLORIDE 1 MG/ML
2 INJECTION INTRAMUSCULAR; INTRAVENOUS
Status: DISCONTINUED | OUTPATIENT
Start: 2019-06-26 | End: 2019-06-29 | Stop reason: HOSPADM

## 2019-06-26 RX ORDER — FENTANYL CITRATE 50 UG/ML
50 INJECTION, SOLUTION INTRAMUSCULAR; INTRAVENOUS
Status: DISCONTINUED | OUTPATIENT
Start: 2019-06-26 | End: 2019-06-29 | Stop reason: HOSPADM

## 2019-06-26 RX ORDER — LIDOCAINE HYDROCHLORIDE 20 MG/ML
INJECTION, SOLUTION INFILTRATION; PERINEURAL
Status: COMPLETED | OUTPATIENT
Start: 2019-06-26 | End: 2019-06-26

## 2019-06-26 RX ADMIN — SODIUM CHLORIDE 250 ML: 9 INJECTION, SOLUTION INTRAVENOUS at 09:45

## 2019-06-26 RX ADMIN — LIDOCAINE HYDROCHLORIDE 3 ML: 20 INJECTION, SOLUTION INFILTRATION; PERINEURAL at 10:00

## 2019-06-26 RX ADMIN — MIDAZOLAM HYDROCHLORIDE 0.5 MG: 1 INJECTION, SOLUTION INTRAMUSCULAR; INTRAVENOUS at 09:56

## 2019-06-26 RX ADMIN — FENTANYL CITRATE 50 MCG: 50 INJECTION, SOLUTION INTRAMUSCULAR; INTRAVENOUS at 09:57

## 2019-06-26 RX ADMIN — BACITRACIN ZINC, NEOMYCIN SULFATE, AND POLYMYXIN B SULFATE 1 G: 400; 3.5; 5 OINTMENT TOPICAL at 10:10

## 2019-06-26 ASSESSMENT — PAIN - FUNCTIONAL ASSESSMENT: PAIN_FUNCTIONAL_ASSESSMENT: 0-10

## 2019-06-26 ASSESSMENT — PAIN SCALES - GENERAL: PAINLEVEL_OUTOF10: 0

## 2019-06-26 NOTE — PROGRESS NOTES
Patient brought to the CT holding room for initial interview, mother here at her side for support. 4349 Procedure explained briefly and consent obtained. 1891 Blood patch obtained via PICC line red port.    4180 Jhonny Humphrey CNP here now to do pre sedation evaluation.

## 2019-06-26 NOTE — PROGRESS NOTES
Pt back to CT HR via cart. Right lung biopsy site band aid intact, no bleeding noted. Pt denies SOB or pain. Vitals stable. HOB elevated. Pt sipping water, mom at side. Pt with a slight cough. 1040 resting watching TV. No complaints voiced. 1100 no changes noted with pt. Eating crackers. o2 2l per NC removed at this time. 1120 pt to xray dept for post chest xray via cart. 1145 pt back to CT HR via cart. Denies SOB or pain. 1200 vitals stable. Right chest biopsy site unchanged. D/c instructions given to pt and verbalized understanding. Dejuan Alvarez, NP aware of post chest xray. Ok to d/c home now. 1210 right PICC dressing intact. Alcohol caps intact. Pt dressed without difficulty. 1215 pt refuse w/c transport, walking gait steady. Taken to jovani d/c home with mom. No complaints voiced.

## 2019-06-26 NOTE — PRE SEDATION
Sedation Pre-Procedure Note    Patient Name: Josh Dominguez   YOB: 1980  Room/Bed: Room/bed info not found  Medical Record Number: [id number]  Date: 6/26/2019   Time: 9:05 AM       1. Indication:  CT Guided Needle Biopsy of RUL Pulmonary Mass    Consent: I have discussed with the patient and/or the patient representative the indication, alternatives, and the possible risks and/or complications of the planned procedure and the anesthesia methods. The patient and/or patient representative appear to understand and agree to proceed. Vital Signs:   Vitals:    06/26/19 0850   BP: 131/77   Pulse: 96   Resp: 18   Temp: 98.7 °F (37.1 °C)   SpO2: 94%       Past Medical History:   has a past medical history of Endometrial cancer ([facility] [address] and Prolonged emergence from general anesthesia. Past Surgical History:   has a past surgical history that includes Hysterectomy (10/2015) and bronchoscopy (N/A, 5/30/2019). Medications:   Scheduled Meds:    neomycin-bacitracin-polymyxin   Topical Once     Continuous Infusions:   PRN Meds: sodium chloride, fentanNYL, lidocaine, midazolam, ondansetron  Home Meds:   Prior to Admission medications    Medication Sig Start Date End Date Taking? Authorizing Provider   citalopram (CELEXA) 10 MG tablet Take 10 mg by mouth daily   Yes Historical Provider, MD     Coumadin Use Last 7 Days:  no  Antiplatelet drug therapy use last 7 days: no  Other anticoagulant use last 7 days: no  Additional Medication Information:  none      Pre-Sedation Documentation and Exam:   I have personally completed a history, physical exam & review of systems for this patient (see notes).     Mallampati Airway Assessment:  Mallampati Class  IV - (soft palate not visible)    Prior History of Anesthesia Complications:   none    ASA Classification:  Class 2 - A normal healthy patient with mild systemic disease    Sedation/ Anesthesia Plan:   intravenous sedation    Medications Planned:   midazolam (Versed) intravenously and fentanyl intravenously    Patient is an appropriate candidate for plan of sedation: yes    Electronically signed by THONG Mathis CNP on 6/26/2019 at 9:05 AM

## 2019-06-26 NOTE — SEDATION DOCUMENTATION
SEDATION ADMINISTERED    Patient positioned supine on CT table. Vitals Monitor applied. VSS. CT scans done. 1498 Dr Agapito Maher here reviewing scans. 1155 Dr Agapito Maher speaking with pt. Timeout completed. 1083 Versed 0.5 mg IV and Fentanyl 50 mcg IV sedation administered. 0980 Right upper lung site marked, prepped with Chloraprep and draped with sterile drape and towels. 1000 Skin numbed with Lidocaine 2% by Dr Agapito Maher.    1001 CT Fluoro guidance used to place 22 g  Spinal needle. 1004 Temno core biopsy needle 20 g x 15cm used to obtain a total of 3 samples. Samples placed into formalin (2) and culture cup (1). Verbal and tactile reassurance provided throughout. 1009 Blood patch administered as introducer withdrawn, pressure held then neosporin and bandaid applied. Patient tolerated well. 1011 Final images obtained. Specimen taken to lab. Patient taken to CT Holding Room for Recovery.     Total Sedation Given:  Versed:     0.5 mg       Fentanyl:    50 mcg

## 2019-06-29 LAB
ANAEROBIC CULTURE: NORMAL
CULTURE SURGICAL: NORMAL
GRAM STAIN RESULT: NORMAL

## 2019-07-03 ENCOUNTER — PREP FOR PROCEDURE (OUTPATIENT)
Dept: INTERVENTIONAL RADIOLOGY/VASCULAR | Age: 39
End: 2019-07-03

## 2019-07-03 DIAGNOSIS — R91.8 LUNG MASS: Primary | ICD-10-CM

## 2019-07-03 DIAGNOSIS — A49.8 FUSOBACTERIUM INFECTION: ICD-10-CM

## 2019-07-03 RX ORDER — 0.9 % SODIUM CHLORIDE 0.9 %
250 INTRAVENOUS SOLUTION INTRAVENOUS
Status: CANCELLED | OUTPATIENT
Start: 2019-07-03

## 2019-07-03 RX ORDER — IBUPROFEN 200 MG
TABLET ORAL ONCE
Status: CANCELLED | OUTPATIENT
Start: 2019-07-03 | End: 2019-07-03

## 2019-07-03 RX ORDER — FENTANYL CITRATE 50 UG/ML
50 INJECTION, SOLUTION INTRAMUSCULAR; INTRAVENOUS
Status: CANCELLED | OUTPATIENT
Start: 2019-07-03

## 2019-07-03 RX ORDER — MIDAZOLAM HYDROCHLORIDE 1 MG/ML
2 INJECTION INTRAMUSCULAR; INTRAVENOUS
Status: CANCELLED | OUTPATIENT
Start: 2019-07-03

## 2019-07-03 RX ORDER — LIDOCAINE HYDROCHLORIDE 20 MG/ML
5 INJECTION, SOLUTION INFILTRATION; PERINEURAL
Status: CANCELLED | OUTPATIENT
Start: 2019-07-03

## 2019-07-08 ENCOUNTER — TELEPHONE (OUTPATIENT)
Dept: INTERVENTIONAL RADIOLOGY/VASCULAR | Age: 39
End: 2019-07-08

## 2019-07-08 ENCOUNTER — HOSPITAL ENCOUNTER (OUTPATIENT)
Dept: INFUSION THERAPY | Age: 39
Setting detail: INFUSION SERIES
Discharge: HOME | End: 2019-07-08
Payer: COMMERCIAL

## 2019-07-08 VITALS
HEART RATE: 105 BPM | SYSTOLIC BLOOD PRESSURE: 113 MMHG | TEMPERATURE: 98 F | RESPIRATION RATE: 16 BRPM | DIASTOLIC BLOOD PRESSURE: 74 MMHG

## 2019-07-08 DIAGNOSIS — C54.1 ENDOMETRIAL CANCER (HCC): Primary | ICD-10-CM

## 2019-07-08 PROCEDURE — 96523 IRRIG DRUG DELIVERY DEVICE: CPT

## 2019-07-08 RX ORDER — SODIUM CHLORIDE 0.9 % (FLUSH) 0.9 %
20 SYRINGE (ML) INJECTION PRN
Status: CANCELLED | OUTPATIENT
Start: 2019-07-08

## 2019-07-08 RX ORDER — SODIUM CHLORIDE 0.9 % (FLUSH) 0.9 %
10 SYRINGE (ML) INJECTION PRN
Status: CANCELLED | OUTPATIENT
Start: 2019-07-08

## 2019-07-08 RX ORDER — SODIUM CHLORIDE 0.9 % (FLUSH) 0.9 %
10 SYRINGE (ML) INJECTION PRN
Status: DISCONTINUED | OUTPATIENT
Start: 2019-07-08 | End: 2019-07-09 | Stop reason: HOSPADM

## 2019-07-08 NOTE — PROGRESS NOTES
1045. Pt to out patient infusion for PICC dressing change. Pt states she has not been able to get ahold of I.D. to make follow up appt. States she needs the PICC line for chemo. States she had one biopsy (lung) and needs another biopsy due to indeterminate. PICC dressing changed with sterile precautions. Site with no noted problems. Good blood return from both (dual) ports. Caps changed. No requests or questions. All equipment used in the care for this patient has been cleaned.

## 2019-07-10 ENCOUNTER — HOSPITAL ENCOUNTER (OUTPATIENT)
Dept: CT IMAGING | Age: 39
Discharge: HOME | End: 2019-07-12
Payer: COMMERCIAL

## 2019-07-10 ENCOUNTER — HOSPITAL ENCOUNTER (OUTPATIENT)
Dept: GENERAL RADIOLOGY | Age: 39
Discharge: HOME | End: 2019-07-12
Payer: COMMERCIAL

## 2019-07-10 VITALS
HEART RATE: 104 BPM | TEMPERATURE: 98.4 F | OXYGEN SATURATION: 96 % | RESPIRATION RATE: 18 BRPM | DIASTOLIC BLOOD PRESSURE: 61 MMHG | SYSTOLIC BLOOD PRESSURE: 134 MMHG

## 2019-07-10 DIAGNOSIS — R91.8 LUNG MASS: ICD-10-CM

## 2019-07-10 DIAGNOSIS — A49.8 FUSOBACTERIUM INFECTION: ICD-10-CM

## 2019-07-10 PROCEDURE — 99213 OFFICE O/P EST LOW 20 MIN: CPT | Performed by: RADIOLOGY

## 2019-07-10 PROCEDURE — 77012 CT SCAN FOR NEEDLE BIOPSY: CPT

## 2019-07-10 PROCEDURE — 77012 CT SCAN FOR NEEDLE BIOPSY: CPT | Performed by: RADIOLOGY

## 2019-07-10 PROCEDURE — 71046 X-RAY EXAM CHEST 2 VIEWS: CPT

## 2019-07-10 PROCEDURE — 88342 IMHCHEM/IMCYTCHM 1ST ANTB: CPT

## 2019-07-10 PROCEDURE — 88341 IMHCHEM/IMCYTCHM EA ADD ANTB: CPT

## 2019-07-10 PROCEDURE — 88313 SPECIAL STAINS GROUP 2: CPT

## 2019-07-10 PROCEDURE — 2500000003 HC RX 250 WO HCPCS: Performed by: RADIOLOGY

## 2019-07-10 PROCEDURE — 88305 TISSUE EXAM BY PATHOLOGIST: CPT

## 2019-07-10 PROCEDURE — 32405 CT NEEDLE BIOPSY LUNG PERCUTANEOUS: CPT | Performed by: RADIOLOGY

## 2019-07-10 PROCEDURE — 6370000000 HC RX 637 (ALT 250 FOR IP): Performed by: RADIOLOGY

## 2019-07-10 PROCEDURE — 32405 CT NEEDLE BIOPSY LUNG PERCUTANEOUS: CPT

## 2019-07-10 PROCEDURE — 6360000002 HC RX W HCPCS: Performed by: RADIOLOGY

## 2019-07-10 PROCEDURE — 99152 MOD SED SAME PHYS/QHP 5/>YRS: CPT | Performed by: RADIOLOGY

## 2019-07-10 PROCEDURE — 71046 X-RAY EXAM CHEST 2 VIEWS: CPT | Performed by: RADIOLOGY

## 2019-07-10 RX ORDER — LIDOCAINE HYDROCHLORIDE 20 MG/ML
INJECTION, SOLUTION INFILTRATION; PERINEURAL
Status: COMPLETED | OUTPATIENT
Start: 2019-07-10 | End: 2019-07-10

## 2019-07-10 RX ORDER — FENTANYL CITRATE 50 UG/ML
INJECTION, SOLUTION INTRAMUSCULAR; INTRAVENOUS
Status: COMPLETED | OUTPATIENT
Start: 2019-07-10 | End: 2019-07-10

## 2019-07-10 RX ORDER — MIDAZOLAM HYDROCHLORIDE 1 MG/ML
INJECTION INTRAMUSCULAR; INTRAVENOUS
Status: COMPLETED | OUTPATIENT
Start: 2019-07-10 | End: 2019-07-10

## 2019-07-10 RX ORDER — BACITRACIN, NEOMYCIN, POLYMYXIN B 400; 3.5; 5 [USP'U]/G; MG/G; [USP'U]/G
OINTMENT TOPICAL ONCE
Status: COMPLETED | OUTPATIENT
Start: 2019-07-10 | End: 2019-07-10

## 2019-07-10 RX ORDER — IBUPROFEN 200 MG
400 TABLET ORAL DAILY PRN
COMMUNITY
End: 2019-08-14

## 2019-07-10 RX ADMIN — MIDAZOLAM HYDROCHLORIDE 0.5 MG: 1 INJECTION, SOLUTION INTRAMUSCULAR; INTRAVENOUS at 11:50

## 2019-07-10 RX ADMIN — FENTANYL CITRATE 50 MCG: 50 INJECTION, SOLUTION INTRAMUSCULAR; INTRAVENOUS at 11:50

## 2019-07-10 RX ADMIN — BACITRACIN ZINC, NEOMYCIN SULFATE, AND POLYMYXIN B SULFATE 1 G: 400; 3.5; 5 OINTMENT TOPICAL at 12:03

## 2019-07-10 RX ADMIN — LIDOCAINE HYDROCHLORIDE 10 ML: 20 INJECTION, SOLUTION INFILTRATION; PERINEURAL at 11:53

## 2019-07-10 ASSESSMENT — PAIN SCALES - GENERAL: PAINLEVEL_OUTOF10: 0

## 2019-07-10 ASSESSMENT — ENCOUNTER SYMPTOMS
SHORTNESS OF BREATH: 0
PHOTOPHOBIA: 0
DIARRHEA: 0
RESPIRATORY NEGATIVE: 1
GASTROINTESTINAL NEGATIVE: 1
COUGH: 0
VOMITING: 0
ABDOMINAL PAIN: 0
EYES NEGATIVE: 1
BACK PAIN: 0
WHEEZING: 0
NAUSEA: 0
CONSTIPATION: 0

## 2019-07-10 ASSESSMENT — PAIN - FUNCTIONAL ASSESSMENT: PAIN_FUNCTIONAL_ASSESSMENT: 0-10

## 2019-07-16 ENCOUNTER — TELEPHONE (OUTPATIENT)
Dept: PULMONOLOGY | Age: 39
End: 2019-07-16

## 2019-07-16 NOTE — TELEPHONE ENCOUNTER
Called patient and discussed biopsy results with her, she has appointment with oncology tomorrow, all questions answered.

## 2019-07-17 PROBLEM — I87.8 POOR VENOUS ACCESS: Status: ACTIVE | Noted: 2019-07-17

## 2019-07-17 PROBLEM — C34.90 LUNG CANCER (HCC): Status: ACTIVE | Noted: 2019-07-17

## 2019-07-22 PROBLEM — R59.0 HILAR LYMPHADENOPATHY: Status: ACTIVE | Noted: 2019-07-22

## 2019-07-22 PROBLEM — R59.0 MEDIASTINAL LYMPHADENOPATHY: Status: ACTIVE | Noted: 2019-07-22

## 2019-07-22 PROBLEM — Z85.42 HISTORY OF ENDOMETRIAL CANCER: Status: ACTIVE | Noted: 2019-07-22

## 2019-07-26 ENCOUNTER — APPOINTMENT (OUTPATIENT)
Dept: CT IMAGING | Age: 39
End: 2019-07-26
Payer: COMMERCIAL

## 2019-07-26 ENCOUNTER — HOSPITAL ENCOUNTER (OUTPATIENT)
Dept: MRI IMAGING | Age: 39
Discharge: HOME | End: 2019-07-28
Payer: COMMERCIAL

## 2019-07-26 DIAGNOSIS — C34.90 MALIGNANT NEOPLASM OF LUNG, UNSPECIFIED LATERALITY, UNSPECIFIED PART OF LUNG (HCC): ICD-10-CM

## 2019-07-26 PROCEDURE — 70551 MRI BRAIN STEM W/O DYE: CPT

## 2019-07-31 ENCOUNTER — APPOINTMENT (OUTPATIENT)
Dept: CT IMAGING | Age: 39
DRG: 176 | End: 2019-07-31
Payer: COMMERCIAL

## 2019-07-31 ENCOUNTER — HOSPITAL ENCOUNTER (INPATIENT)
Age: 39
LOS: 1 days | Discharge: HOME | DRG: 176 | End: 2019-08-01
Attending: FAMILY MEDICINE | Admitting: INTERNAL MEDICINE
Payer: COMMERCIAL

## 2019-07-31 DIAGNOSIS — I26.99 OTHER ACUTE PULMONARY EMBOLISM WITHOUT ACUTE COR PULMONALE (HCC): Primary | ICD-10-CM

## 2019-07-31 LAB
ALBUMIN SERPL-MCNC: 3.4 G/DL (ref 3.5–4.6)
ALP BLD-CCNC: 66 U/L (ref 40–130)
ALT SERPL-CCNC: 14 U/L (ref 0–33)
ANION GAP SERPL CALCULATED.3IONS-SCNC: 12 MEQ/L (ref 9–15)
APTT: 34 SEC (ref 24.4–36.8)
AST SERPL-CCNC: 12 U/L (ref 0–35)
BACTERIA: ABNORMAL /HPF
BASOPHILS ABSOLUTE: 0.1 K/UL (ref 0–0.2)
BASOPHILS RELATIVE PERCENT: 1.3 %
BILIRUB SERPL-MCNC: <0.2 MG/DL (ref 0.2–0.7)
BILIRUBIN URINE: NEGATIVE
BLOOD, URINE: NEGATIVE
BUN BLDV-MCNC: 16 MG/DL (ref 6–20)
CALCIUM SERPL-MCNC: 8.3 MG/DL (ref 8.5–9.9)
CHLORIDE BLD-SCNC: 100 MEQ/L (ref 95–107)
CLARITY: CLEAR
CO2: 26 MEQ/L (ref 20–31)
COLOR: YELLOW
CREAT SERPL-MCNC: 0.94 MG/DL (ref 0.5–0.9)
EKG ATRIAL RATE: 114 BPM
EKG P AXIS: 35 DEGREES
EKG P-R INTERVAL: 140 MS
EKG Q-T INTERVAL: 352 MS
EKG QRS DURATION: 84 MS
EKG QTC CALCULATION (BAZETT): 485 MS
EKG R AXIS: 78 DEGREES
EKG T AXIS: 65 DEGREES
EKG VENTRICULAR RATE: 114 BPM
EOSINOPHILS ABSOLUTE: 0.2 K/UL (ref 0–0.7)
EOSINOPHILS RELATIVE PERCENT: 2.1 %
EPITHELIAL CELLS, UA: ABNORMAL /HPF (ref 0–5)
GFR AFRICAN AMERICAN: >60
GFR NON-AFRICAN AMERICAN: >60
GLOBULIN: 3.4 G/DL (ref 2.3–3.5)
GLUCOSE BLD-MCNC: 316 MG/DL (ref 70–99)
GLUCOSE URINE: 250 MG/DL
HCT VFR BLD CALC: 33.7 % (ref 37–47)
HEMOGLOBIN: 11.3 G/DL (ref 12–16)
HYALINE CASTS: ABNORMAL /HPF (ref 0–5)
INR BLD: 1
KETONES, URINE: NEGATIVE MG/DL
LACTIC ACID: 1.9 MMOL/L (ref 0.5–2.2)
LEUKOCYTE ESTERASE, URINE: NEGATIVE
LYMPHOCYTES ABSOLUTE: 1.2 K/UL (ref 1–4.8)
LYMPHOCYTES RELATIVE PERCENT: 12.4 %
MAGNESIUM: 1.5 MG/DL (ref 1.7–2.4)
MCH RBC QN AUTO: 26.4 PG (ref 27–31.3)
MCHC RBC AUTO-ENTMCNC: 33.5 % (ref 33–37)
MCV RBC AUTO: 78.8 FL (ref 82–100)
MONOCYTES ABSOLUTE: 0.4 K/UL (ref 0.2–0.8)
MONOCYTES RELATIVE PERCENT: 4.6 %
NEUTROPHILS ABSOLUTE: 7.6 K/UL (ref 1.4–6.5)
NEUTROPHILS RELATIVE PERCENT: 79.6 %
NITRITE, URINE: POSITIVE
PDW BLD-RTO: 15.2 % (ref 11.5–14.5)
PH UA: 6 (ref 5–9)
PLATELET # BLD: 303 K/UL (ref 130–400)
POTASSIUM SERPL-SCNC: 3.9 MEQ/L (ref 3.4–4.9)
PRO-BNP: 221 PG/ML
PROTEIN UA: NEGATIVE MG/DL
PROTHROMBIN TIME: 13.3 SEC (ref 12.3–14.9)
RBC # BLD: 4.28 M/UL (ref 4.2–5.4)
RBC UA: ABNORMAL /HPF (ref 0–5)
SODIUM BLD-SCNC: 138 MEQ/L (ref 135–144)
SPECIFIC GRAVITY UA: 1.05 (ref 1–1.03)
TOTAL CK: 65 U/L (ref 0–170)
TOTAL PROTEIN: 6.8 G/DL (ref 6.3–8)
TROPONIN: <0.01 NG/ML (ref 0–0.01)
URINE REFLEX TO CULTURE: YES
UROBILINOGEN, URINE: 0.2 E.U./DL
WBC # BLD: 9.6 K/UL (ref 4.8–10.8)
WBC UA: ABNORMAL /HPF (ref 0–5)

## 2019-07-31 PROCEDURE — 83735 ASSAY OF MAGNESIUM: CPT

## 2019-07-31 PROCEDURE — 2580000003 HC RX 258: Performed by: PHYSICIAN ASSISTANT

## 2019-07-31 PROCEDURE — 6370000000 HC RX 637 (ALT 250 FOR IP): Performed by: PHYSICIAN ASSISTANT

## 2019-07-31 PROCEDURE — G0378 HOSPITAL OBSERVATION PER HR: HCPCS

## 2019-07-31 PROCEDURE — 87086 URINE CULTURE/COLONY COUNT: CPT

## 2019-07-31 PROCEDURE — 1210000000 HC MED SURG R&B

## 2019-07-31 PROCEDURE — 83880 ASSAY OF NATRIURETIC PEPTIDE: CPT

## 2019-07-31 PROCEDURE — 84484 ASSAY OF TROPONIN QUANT: CPT

## 2019-07-31 PROCEDURE — 80053 COMPREHEN METABOLIC PANEL: CPT

## 2019-07-31 PROCEDURE — 82550 ASSAY OF CK (CPK): CPT

## 2019-07-31 PROCEDURE — 99285 EMERGENCY DEPT VISIT HI MDM: CPT

## 2019-07-31 PROCEDURE — 85610 PROTHROMBIN TIME: CPT

## 2019-07-31 PROCEDURE — 85730 THROMBOPLASTIN TIME PARTIAL: CPT

## 2019-07-31 PROCEDURE — 36415 COLL VENOUS BLD VENIPUNCTURE: CPT

## 2019-07-31 PROCEDURE — 93005 ELECTROCARDIOGRAM TRACING: CPT | Performed by: PHYSICIAN ASSISTANT

## 2019-07-31 PROCEDURE — 71275 CT ANGIOGRAPHY CHEST: CPT

## 2019-07-31 PROCEDURE — 81001 URINALYSIS AUTO W/SCOPE: CPT

## 2019-07-31 PROCEDURE — 83605 ASSAY OF LACTIC ACID: CPT

## 2019-07-31 PROCEDURE — 6360000004 HC RX CONTRAST MEDICATION: Performed by: PHYSICIAN ASSISTANT

## 2019-07-31 PROCEDURE — 2700000000 HC OXYGEN THERAPY PER DAY

## 2019-07-31 PROCEDURE — 87077 CULTURE AEROBIC IDENTIFY: CPT

## 2019-07-31 PROCEDURE — 6370000000 HC RX 637 (ALT 250 FOR IP): Performed by: FAMILY MEDICINE

## 2019-07-31 PROCEDURE — 87040 BLOOD CULTURE FOR BACTERIA: CPT

## 2019-07-31 PROCEDURE — 85025 COMPLETE CBC W/AUTO DIFF WBC: CPT

## 2019-07-31 PROCEDURE — 87186 SC STD MICRODIL/AGAR DIL: CPT

## 2019-07-31 RX ORDER — SODIUM CHLORIDE 0.9 % (FLUSH) 0.9 %
10 SYRINGE (ML) INJECTION EVERY 12 HOURS SCHEDULED
Status: DISCONTINUED | OUTPATIENT
Start: 2019-07-31 | End: 2019-08-01 | Stop reason: HOSPADM

## 2019-07-31 RX ORDER — SODIUM CHLORIDE 0.9 % (FLUSH) 0.9 %
10 SYRINGE (ML) INJECTION ONCE
Status: COMPLETED | OUTPATIENT
Start: 2019-07-31 | End: 2019-07-31

## 2019-07-31 RX ORDER — CITALOPRAM 10 MG/1
10 TABLET ORAL NIGHTLY
Status: DISCONTINUED | OUTPATIENT
Start: 2019-07-31 | End: 2019-08-01 | Stop reason: HOSPADM

## 2019-07-31 RX ORDER — SODIUM CHLORIDE 0.9 % (FLUSH) 0.9 %
10 SYRINGE (ML) INJECTION PRN
Status: DISCONTINUED | OUTPATIENT
Start: 2019-07-31 | End: 2019-08-01 | Stop reason: HOSPADM

## 2019-07-31 RX ORDER — SODIUM CHLORIDE 9 MG/ML
INJECTION, SOLUTION INTRAVENOUS CONTINUOUS
Status: DISCONTINUED | OUTPATIENT
Start: 2019-07-31 | End: 2019-08-01 | Stop reason: HOSPADM

## 2019-07-31 RX ORDER — ONDANSETRON 2 MG/ML
4 INJECTION INTRAMUSCULAR; INTRAVENOUS EVERY 6 HOURS PRN
Status: DISCONTINUED | OUTPATIENT
Start: 2019-07-31 | End: 2019-08-01 | Stop reason: HOSPADM

## 2019-07-31 RX ADMIN — IOPAMIDOL 100 ML: 612 INJECTION, SOLUTION INTRAVENOUS at 17:54

## 2019-07-31 RX ADMIN — Medication 10 ML: at 17:54

## 2019-07-31 RX ADMIN — SODIUM CHLORIDE: 9 INJECTION, SOLUTION INTRAVENOUS at 22:22

## 2019-07-31 RX ADMIN — Medication 10 ML: at 22:22

## 2019-07-31 RX ADMIN — CITALOPRAM HYDROBROMIDE 10 MG: 10 TABLET ORAL at 22:22

## 2019-07-31 RX ADMIN — APIXABAN 10 MG: 5 TABLET, FILM COATED ORAL at 19:59

## 2019-07-31 ASSESSMENT — ENCOUNTER SYMPTOMS
TROUBLE SWALLOWING: 0
BACK PAIN: 1
ALLERGIC/IMMUNOLOGIC NEGATIVE: 1
NAUSEA: 1
EYE PAIN: 0
SHORTNESS OF BREATH: 1
ABDOMINAL PAIN: 0
APNEA: 0
COLOR CHANGE: 0

## 2019-07-31 ASSESSMENT — PAIN DESCRIPTION - ORIENTATION: ORIENTATION: LEFT

## 2019-07-31 ASSESSMENT — PAIN DESCRIPTION - FREQUENCY: FREQUENCY: INTERMITTENT

## 2019-07-31 ASSESSMENT — PAIN DESCRIPTION - DESCRIPTORS: DESCRIPTORS: SQUEEZING

## 2019-07-31 ASSESSMENT — PAIN DESCRIPTION - LOCATION: LOCATION: SHOULDER

## 2019-07-31 ASSESSMENT — PAIN SCALES - GENERAL: PAINLEVEL_OUTOF10: 0

## 2019-08-01 ENCOUNTER — APPOINTMENT (OUTPATIENT)
Dept: ULTRASOUND IMAGING | Age: 39
DRG: 176 | End: 2019-08-01
Payer: COMMERCIAL

## 2019-08-01 VITALS
DIASTOLIC BLOOD PRESSURE: 63 MMHG | BODY MASS INDEX: 41.02 KG/M2 | TEMPERATURE: 97.7 F | RESPIRATION RATE: 19 BRPM | WEIGHT: 293 LBS | HEART RATE: 93 BPM | HEIGHT: 71 IN | OXYGEN SATURATION: 97 % | SYSTOLIC BLOOD PRESSURE: 115 MMHG

## 2019-08-01 LAB
ANION GAP SERPL CALCULATED.3IONS-SCNC: 10 MEQ/L (ref 9–15)
BASOPHILS ABSOLUTE: 0.1 K/UL (ref 0–0.2)
BASOPHILS RELATIVE PERCENT: 1.5 %
BUN BLDV-MCNC: 13 MG/DL (ref 6–20)
CALCIUM SERPL-MCNC: 8.3 MG/DL (ref 8.5–9.9)
CHLORIDE BLD-SCNC: 101 MEQ/L (ref 95–107)
CO2: 28 MEQ/L (ref 20–31)
CREAT SERPL-MCNC: 0.56 MG/DL (ref 0.5–0.9)
EOSINOPHILS ABSOLUTE: 0.2 K/UL (ref 0–0.7)
EOSINOPHILS RELATIVE PERCENT: 2.5 %
GFR AFRICAN AMERICAN: >60
GFR AFRICAN AMERICAN: >60
GFR NON-AFRICAN AMERICAN: >60
GFR NON-AFRICAN AMERICAN: >60
GLUCOSE BLD-MCNC: 185 MG/DL (ref 70–99)
HCT VFR BLD CALC: 31.9 % (ref 37–47)
HEMOGLOBIN: 10.6 G/DL (ref 12–16)
LYMPHOCYTES ABSOLUTE: 0.9 K/UL (ref 1–4.8)
LYMPHOCYTES RELATIVE PERCENT: 11.3 %
MCH RBC QN AUTO: 27 PG (ref 27–31.3)
MCHC RBC AUTO-ENTMCNC: 33.3 % (ref 33–37)
MCV RBC AUTO: 81.3 FL (ref 82–100)
MONOCYTES ABSOLUTE: 0.4 K/UL (ref 0.2–0.8)
MONOCYTES RELATIVE PERCENT: 5.3 %
NEUTROPHILS ABSOLUTE: 6.4 K/UL (ref 1.4–6.5)
NEUTROPHILS RELATIVE PERCENT: 79.4 %
PDW BLD-RTO: 15.8 % (ref 11.5–14.5)
PERFORMED ON: NORMAL
PLATELET # BLD: 225 K/UL (ref 130–400)
POC CREATININE: 0.9 MG/DL (ref 0.6–1.1)
POC SAMPLE TYPE: NORMAL
POTASSIUM REFLEX MAGNESIUM: 4.3 MEQ/L (ref 3.4–4.9)
RBC # BLD: 3.93 M/UL (ref 4.2–5.4)
SODIUM BLD-SCNC: 139 MEQ/L (ref 135–144)
WBC # BLD: 8.1 K/UL (ref 4.8–10.8)

## 2019-08-01 PROCEDURE — 85025 COMPLETE CBC W/AUTO DIFF WBC: CPT

## 2019-08-01 PROCEDURE — 36592 COLLECT BLOOD FROM PICC: CPT

## 2019-08-01 PROCEDURE — 93970 EXTREMITY STUDY: CPT

## 2019-08-01 PROCEDURE — 99222 1ST HOSP IP/OBS MODERATE 55: CPT | Performed by: INTERNAL MEDICINE

## 2019-08-01 PROCEDURE — 93010 ELECTROCARDIOGRAM REPORT: CPT | Performed by: INTERNAL MEDICINE

## 2019-08-01 PROCEDURE — G0378 HOSPITAL OBSERVATION PER HR: HCPCS

## 2019-08-01 PROCEDURE — 80048 BASIC METABOLIC PNL TOTAL CA: CPT

## 2019-08-01 PROCEDURE — 2700000000 HC OXYGEN THERAPY PER DAY

## 2019-08-01 PROCEDURE — 2580000003 HC RX 258: Performed by: PHYSICIAN ASSISTANT

## 2019-08-01 PROCEDURE — 6370000000 HC RX 637 (ALT 250 FOR IP): Performed by: PHYSICIAN ASSISTANT

## 2019-08-01 RX ADMIN — APIXABAN 5 MG: 5 TABLET, FILM COATED ORAL at 10:16

## 2019-08-01 RX ADMIN — SODIUM CHLORIDE: 9 INJECTION, SOLUTION INTRAVENOUS at 10:18

## 2019-08-01 RX ADMIN — Medication 10 ML: at 10:17

## 2019-08-01 NOTE — DISCHARGE SUMMARY
the chest with contrast (pulmonary embolism protocol) INDICATION: Chest pain and shortness of breath. History of uterine cancer COMPARISON: May 25, 4718 TECHNIQUE: Helical CT was performed through the chest utilizing 100 cc of Isovue-370 intravenous contrast.  Images were obtained with bolus tracking in order to opacify the pulmonary arteries. There is no comparison available. Both MIP and 3D volume rendered reconstructions were performed. FINDINGS: Findings are positive for pulmonary emboli within the proximal segmental left lower lobe pulmonary arteries. Again note is made of bilateral multiple pulmonary masses throughout the lung parenchyma too numerous to count. The largest is within the right upper lobe with associated narrowing of the proximal airways. There is been interval development of additional  new masses within the right middle and right lower lobe and the left lower lobe as well some interval enlargement of the largest mass in the left lower lobe as compared to the prior study. No pleural effusions. No pneumothoraces. There is periaortic, pretracheal, right perihilar fullness, left perihilar fullness and subcarinal adenopathy. Within the field-of-view the visualized abdominal contents show diffuse decreased attenuation of the liver. This a nonspecific finding that can be seen with fatty infiltration. Osseous structures are intact. 1.  Findings are positive for proximal segmental pulmonary emboli to the left lower lobe pulmonary arteries. 2.  2. There are multiple masses too numerous to count scattered throughout the lung parenchyma. In addition there are now new nodules seen within the right middle and right lower lobe and left lower lobe as well some enlargement of the left lower lobe mass. Findings suggest interval progression of underlying metastatic, malignant disease.  All CT scans at this facility use dose modulation, iterative reconstruction, and/or weight based dosing when appropriate to

## 2019-08-01 NOTE — CONSULTS
Consult to Pulmonology  Consult performed by: Lory Carreno MD  Consult ordered by: PAT Altamirano        Pulmonary Medicine  Consult Note      Reason for consultation: Pulmonary embolism and lung cancer      HISTORY OF PRESENT ILLNESS:    This is a 58-year-old white female with known history of endometrial cancer in the past recently presented with extensive massive consolidated areas in both lungs biopsies proved that to be metastatic non-small cell lung cancer. Patient woke up yesterday morning with significant increase in her dyspnea. She has had chronic persistent cough but again she has extensive metastatic malignant involvement both lungs. She came to the ER where a CT angiogram was done and showed a central segmental pulmonary embolus in the left upper lobe area. Patient was admitted and started on anticoagulation therapy. She reports no significant pleuritic pain, fever, chills, hemoptysis, or other symptoms. She feels better today according to her. Past Medical History:        Diagnosis Date    Endometrial cancer ([address] 2015    chemo and radiation    Prolonged emergence from general anesthesia 2015       Past Surgical History:        Procedure Laterality Date    BRONCHOSCOPY N/A 5/30/2019    BRONCHOSCOPY performed by Can Neff MD at [address] [place] [address]  10/2015    LUNG BIOPSY Right 06/26/2019    Dr David Merrill  performed       Social History:     reports that she quit smoking about 4 years ago. Her smoking use included cigarettes. She has never used smokeless tobacco. She reports that she drinks alcohol. She reports that she does not use drugs.     Family History:       Problem Relation Age of Onset    Cancer Mother         lymphoma    Diabetes Mother     No Known Problems Father     Diabetes Maternal Grandmother         questionable female cancer    Heart Disease Maternal Grandmother     Cancer Paternal Grandfather         stomach       Allergies:  Pcn

## 2019-08-01 NOTE — PROGRESS NOTES
Ideal Body > 100%  · BMI Classification: BMI > or equal to 40.0 Obese Class III    Nutrition Interventions:   Continue current diet(Continue General diet. ONS not indicated at this time)  Continued Inpatient Monitoring, Education Not Indicated    Nutrition Evaluation:   · Evaluation: Goals set   · Goals: po intake > 75% of meals and supplements.  Stable weight ~ 300#    · Monitoring: Meal Intake, Weight, Pertinent Labs      Electronically signed by Celia Magaña RD, LD on 8/1/19 at 2:05 PM

## 2019-08-02 ENCOUNTER — APPOINTMENT (OUTPATIENT)
Dept: CT IMAGING | Age: 39
DRG: 176 | End: 2019-08-02
Payer: COMMERCIAL

## 2019-08-03 LAB
ORGANISM: ABNORMAL
URINE CULTURE, ROUTINE: ABNORMAL
URINE CULTURE, ROUTINE: ABNORMAL

## 2019-08-05 LAB
BLOOD CULTURE, ROUTINE: NORMAL
CULTURE, BLOOD 2: NORMAL

## 2019-08-14 ENCOUNTER — OFFICE VISIT (OUTPATIENT)
Dept: PULMONOLOGY | Age: 39
End: 2019-08-14
Payer: COMMERCIAL

## 2019-08-14 VITALS
SYSTOLIC BLOOD PRESSURE: 134 MMHG | WEIGHT: 293 LBS | HEIGHT: 71 IN | OXYGEN SATURATION: 97 % | DIASTOLIC BLOOD PRESSURE: 66 MMHG | BODY MASS INDEX: 41.02 KG/M2 | RESPIRATION RATE: 17 BRPM | TEMPERATURE: 97.4 F | HEART RATE: 88 BPM

## 2019-08-14 DIAGNOSIS — R05.9 COUGH: Primary | ICD-10-CM

## 2019-08-14 DIAGNOSIS — E66.01 CLASS 3 SEVERE OBESITY DUE TO EXCESS CALORIES WITHOUT SERIOUS COMORBIDITY WITH BODY MASS INDEX (BMI) OF 40.0 TO 44.9 IN ADULT (HCC): ICD-10-CM

## 2019-08-14 DIAGNOSIS — I26.99 PULMONARY EMBOLISM AND INFARCTION (HCC): ICD-10-CM

## 2019-08-14 DIAGNOSIS — R09.81 NASAL CONGESTION: ICD-10-CM

## 2019-08-14 DIAGNOSIS — C34.90 MALIGNANT NEOPLASM OF LUNG, UNSPECIFIED LATERALITY, UNSPECIFIED PART OF LUNG (HCC): ICD-10-CM

## 2019-08-14 PROCEDURE — 99214 OFFICE O/P EST MOD 30 MIN: CPT | Performed by: INTERNAL MEDICINE

## 2019-08-14 RX ORDER — FLUTICASONE PROPIONATE 50 MCG
1 SPRAY, SUSPENSION (ML) NASAL DAILY
Qty: 1 BOTTLE | Refills: 3 | Status: SHIPPED | OUTPATIENT
Start: 2019-08-14 | End: 2019-11-11

## 2019-08-14 NOTE — PROGRESS NOTES
Subjective:     Katarzyna Merchant is a 46 y.o. female who complains today of:     Chief Complaint   Patient presents with    Follow-up     f/u up from testing       HPI  Patient presents for coughing    Reports no shortness of breath, no chest pain   She is active,  Started chemotherapy yesterday  Cough with yellow phlegm today pink in color, no clear hemoptysis  No fever   No significant weight loss   No lower ext swelling   + nasal congestion, no reported postnasal drip  No GERD     Minimal snoring, wakes up at night only with coughing , no chocking or apnea events, no day time sleepiness, brother has obstructive sleep apnea however.   Patient does not wish to proceed with testing at this time        Allergies:  Pcn [penicillins]; Sulfa antibiotics; and Sulfites  Past Medical History:   Diagnosis Date    Endometrial cancer ([facility] [address] 2015    chemo and radiation    Prolonged emergence from general anesthesia 2015     Past Surgical History:   Procedure Laterality Date    BRONCHOSCOPY N/A 5/30/2019    BRONCHOSCOPY performed by Myra Cleary MD at [address] [place] [address]  10/2015    LUNG BIOPSY Right 06/26/2019    Dr Adan Salazar  performed     Family History   Problem Relation Age of Onset    Cancer Mother         lymphoma    Diabetes Mother     No Known Problems Father     Diabetes Maternal Grandmother         questionable female cancer    Heart Disease Maternal Grandmother     Cancer Paternal Grandfather         stomach     Social History     Socioeconomic History    Marital status: Single     Spouse name: Not on file    Number of children: Not on file    Years of education: Not on file    Highest education level: Not on file   Occupational History    Not on file   Social Needs    Financial resource strain: Not on file    Food insecurity:     Worry: Not on file     Inability: Not on file    Transportation needs:     Medical: Not on file     Non-medical: Not on file   Tobacco Use    Smoking status: time. She appears well-developed and well-nourished. No distress. HENT:   Head: Normocephalic and atraumatic. Eyes: Pupils are equal, round, and reactive to light. Conjunctivae are normal.   Neck: Normal range of motion. Neck supple. Cardiovascular: Normal rate and regular rhythm. Exam reveals no gallop and no friction rub. No murmur heard. Pulmonary/Chest: Effort normal and breath sounds normal. No respiratory distress. She has no wheezes. She has no rales. She exhibits no tenderness. Abdominal: Soft. She exhibits no distension. There is no tenderness. There is no rebound. Musculoskeletal: She exhibits no edema or tenderness. Lymphadenopathy:     She has no cervical adenopathy. Neurological: She is alert and oriented to person, place, and time. Skin: Skin is warm and dry. She is not diaphoretic. No erythema. Psychiatric: She has a normal mood and affect. Judgment normal.     Imaging studies reviewed by me CT chest reviewed by me showing progression of underlying malignant lung disease and PE. Lab results reviewed in chart       Assessment and Plan       Diagnosis Orders   1. Cough  Full PFT Study With Bronchodilator   2. Nasal congestion     3.  Malignant neoplasm of lung, unspecified laterality, unspecified part of lung ([address]     4. Pulmonary embolism and infarction (HCC)     5. Class 3 severe obesity due to excess calories without serious comorbidity with body mass index (BMI) of 40.0 to 44.9 in adult (HCC)       · Cough likely secondary to underlying hyperactive airway disease, will obtain PFT start patient on treatment trial with Asmanex and follow-up  · Nasal congestion, will start Flonase  · Metastatic lung cancer patient on chemotherapy  · PE and patient is on Eliquis  · Lose weight, reports no symptoms of RAFAEL at this time       Orders Placed This Encounter   Procedures    Full PFT Study With Bronchodilator     Standing Status:   Future     Standing Expiration Date:   8/14/2020

## 2019-08-23 ENCOUNTER — HOSPITAL ENCOUNTER (OUTPATIENT)
Dept: PULMONOLOGY | Age: 39
Discharge: HOME | End: 2019-08-23
Payer: COMMERCIAL

## 2019-08-23 DIAGNOSIS — R05.9 COUGH: ICD-10-CM

## 2019-08-23 PROCEDURE — 94729 DIFFUSING CAPACITY: CPT | Performed by: INTERNAL MEDICINE

## 2019-08-23 PROCEDURE — 94060 EVALUATION OF WHEEZING: CPT | Performed by: INTERNAL MEDICINE

## 2019-08-23 PROCEDURE — 94726 PLETHYSMOGRAPHY LUNG VOLUMES: CPT

## 2019-08-23 PROCEDURE — 94060 EVALUATION OF WHEEZING: CPT

## 2019-08-23 PROCEDURE — 6360000002 HC RX W HCPCS: Performed by: INTERNAL MEDICINE

## 2019-08-23 PROCEDURE — 94729 DIFFUSING CAPACITY: CPT

## 2019-08-23 PROCEDURE — 94726 PLETHYSMOGRAPHY LUNG VOLUMES: CPT | Performed by: INTERNAL MEDICINE

## 2019-08-23 RX ORDER — ALBUTEROL SULFATE 2.5 MG/3ML
2.5 SOLUTION RESPIRATORY (INHALATION) ONCE
Status: COMPLETED | OUTPATIENT
Start: 2019-08-23 | End: 2019-08-23

## 2019-08-23 RX ADMIN — ALBUTEROL SULFATE 2.5 MG: 2.5 SOLUTION RESPIRATORY (INHALATION) at 12:35

## 2019-09-25 ENCOUNTER — OFFICE VISIT (OUTPATIENT)
Dept: PULMONOLOGY | Age: 39
End: 2019-09-25
Payer: COMMERCIAL

## 2019-09-25 VITALS
WEIGHT: 293 LBS | TEMPERATURE: 97.4 F | RESPIRATION RATE: 15 BRPM | DIASTOLIC BLOOD PRESSURE: 68 MMHG | OXYGEN SATURATION: 96 % | SYSTOLIC BLOOD PRESSURE: 126 MMHG | HEIGHT: 71 IN | HEART RATE: 94 BPM | BODY MASS INDEX: 41.02 KG/M2

## 2019-09-25 DIAGNOSIS — E66.01 CLASS 3 SEVERE OBESITY DUE TO EXCESS CALORIES WITHOUT SERIOUS COMORBIDITY WITH BODY MASS INDEX (BMI) OF 40.0 TO 44.9 IN ADULT (HCC): ICD-10-CM

## 2019-09-25 DIAGNOSIS — C34.90 MALIGNANT NEOPLASM OF LUNG, UNSPECIFIED LATERALITY, UNSPECIFIED PART OF LUNG (HCC): ICD-10-CM

## 2019-09-25 DIAGNOSIS — R05.9 COUGH: Primary | ICD-10-CM

## 2019-09-25 DIAGNOSIS — I26.99 PULMONARY EMBOLISM AND INFARCTION (HCC): ICD-10-CM

## 2019-09-25 PROCEDURE — 99214 OFFICE O/P EST MOD 30 MIN: CPT | Performed by: INTERNAL MEDICINE

## 2019-09-25 RX ORDER — BUDESONIDE AND FORMOTEROL FUMARATE DIHYDRATE 160; 4.5 UG/1; UG/1
2 AEROSOL RESPIRATORY (INHALATION) 2 TIMES DAILY
Qty: 1 INHALER | Refills: 1 | COMMUNITY
Start: 2019-09-25 | End: 2020-02-04 | Stop reason: SDUPTHER

## 2019-09-25 RX ORDER — DEXAMETHASONE 4 MG/1
TABLET ORAL
Refills: 0 | Status: ON HOLD | COMMUNITY
Start: 2019-09-06 | End: 2020-05-08 | Stop reason: HOSPADM

## 2019-09-25 RX ORDER — PANTOPRAZOLE SODIUM 40 MG/1
40 TABLET, DELAYED RELEASE ORAL
Qty: 30 TABLET | Refills: 1 | Status: SHIPPED | OUTPATIENT
Start: 2019-09-25 | End: 2019-11-06 | Stop reason: SDUPTHER

## 2019-09-26 ENCOUNTER — HOSPITAL ENCOUNTER (OUTPATIENT)
Dept: MRI IMAGING | Age: 39
Discharge: HOME | End: 2019-09-28
Payer: COMMERCIAL

## 2019-09-26 DIAGNOSIS — C34.90 MALIGNANT NEOPLASM OF LUNG, UNSPECIFIED LATERALITY, UNSPECIFIED PART OF LUNG (HCC): ICD-10-CM

## 2019-09-26 DIAGNOSIS — C79.31 BRAIN METASTASIS (HCC): ICD-10-CM

## 2019-09-26 DIAGNOSIS — R51.9 NONINTRACTABLE HEADACHE, UNSPECIFIED CHRONICITY PATTERN, UNSPECIFIED HEADACHE TYPE: ICD-10-CM

## 2019-10-30 ENCOUNTER — APPOINTMENT (OUTPATIENT)
Dept: CT IMAGING | Age: 39
End: 2019-10-30
Payer: COMMERCIAL

## 2019-10-30 ENCOUNTER — HOSPITAL ENCOUNTER (OUTPATIENT)
Dept: CT IMAGING | Age: 39
Discharge: HOME | End: 2019-11-01
Payer: COMMERCIAL

## 2019-10-30 VITALS — BODY MASS INDEX: 41.02 KG/M2 | WEIGHT: 293 LBS | HEIGHT: 71 IN

## 2019-10-30 DIAGNOSIS — D50.8 ACHLORHYDRIC ANEMIA: ICD-10-CM

## 2019-10-30 DIAGNOSIS — C34.90 MALIGNANT NEOPLASM OF LUNG, UNSPECIFIED LATERALITY, UNSPECIFIED PART OF LUNG (HCC): ICD-10-CM

## 2019-10-30 DIAGNOSIS — F41.9 ANXIETY DISORDER, UNSPECIFIED TYPE: ICD-10-CM

## 2019-10-30 PROCEDURE — 6360000004 HC RX CONTRAST MEDICATION: Performed by: INTERNAL MEDICINE

## 2019-10-30 PROCEDURE — 70470 CT HEAD/BRAIN W/O & W/DYE: CPT

## 2019-10-30 PROCEDURE — 71260 CT THORAX DX C+: CPT

## 2019-10-30 RX ORDER — SODIUM CHLORIDE 0.9 % (FLUSH) 0.9 %
10 SYRINGE (ML) INJECTION ONCE
Status: DISCONTINUED | OUTPATIENT
Start: 2019-10-30 | End: 2019-11-02 | Stop reason: HOSPADM

## 2019-10-30 RX ADMIN — IOPAMIDOL 75 ML: 612 INJECTION, SOLUTION INTRAVENOUS at 10:18

## 2019-11-06 ENCOUNTER — OFFICE VISIT (OUTPATIENT)
Dept: PULMONOLOGY | Age: 39
End: 2019-11-06
Payer: COMMERCIAL

## 2019-11-06 VITALS
BODY MASS INDEX: 41.02 KG/M2 | HEART RATE: 117 BPM | RESPIRATION RATE: 17 BRPM | TEMPERATURE: 98.6 F | DIASTOLIC BLOOD PRESSURE: 84 MMHG | OXYGEN SATURATION: 91 % | SYSTOLIC BLOOD PRESSURE: 136 MMHG | HEIGHT: 71 IN | WEIGHT: 293 LBS

## 2019-11-06 DIAGNOSIS — R09.81 NASAL CONGESTION: ICD-10-CM

## 2019-11-06 DIAGNOSIS — R04.2 HEMOPTYSIS: ICD-10-CM

## 2019-11-06 DIAGNOSIS — R05.9 COUGH: Primary | ICD-10-CM

## 2019-11-06 DIAGNOSIS — E66.01 CLASS 3 SEVERE OBESITY DUE TO EXCESS CALORIES WITHOUT SERIOUS COMORBIDITY WITH BODY MASS INDEX (BMI) OF 40.0 TO 44.9 IN ADULT (HCC): ICD-10-CM

## 2019-11-06 DIAGNOSIS — I26.99 PULMONARY EMBOLISM AND INFARCTION (HCC): ICD-10-CM

## 2019-11-06 DIAGNOSIS — C34.90 MALIGNANT NEOPLASM OF LUNG, UNSPECIFIED LATERALITY, UNSPECIFIED PART OF LUNG (HCC): ICD-10-CM

## 2019-11-06 PROCEDURE — 99214 OFFICE O/P EST MOD 30 MIN: CPT | Performed by: INTERNAL MEDICINE

## 2019-11-06 RX ORDER — PANTOPRAZOLE SODIUM 40 MG/1
40 TABLET, DELAYED RELEASE ORAL
Qty: 30 TABLET | Refills: 1 | Status: ON HOLD | OUTPATIENT
Start: 2019-11-06 | End: 2020-05-06

## 2019-11-06 RX ORDER — AZITHROMYCIN 250 MG/1
250 TABLET, FILM COATED ORAL DAILY
COMMUNITY
End: 2019-11-07

## 2019-11-07 ENCOUNTER — OFFICE VISIT (OUTPATIENT)
Dept: ENDOCRINOLOGY | Age: 39
End: 2019-11-07
Payer: COMMERCIAL

## 2019-11-07 VITALS
RESPIRATION RATE: 18 BRPM | OXYGEN SATURATION: 94 % | HEART RATE: 96 BPM | WEIGHT: 293 LBS | DIASTOLIC BLOOD PRESSURE: 79 MMHG | HEIGHT: 71 IN | SYSTOLIC BLOOD PRESSURE: 116 MMHG | BODY MASS INDEX: 41.02 KG/M2

## 2019-11-07 DIAGNOSIS — E04.9 GOITER: ICD-10-CM

## 2019-11-07 DIAGNOSIS — E11.65 UNCONTROLLED TYPE 2 DIABETES MELLITUS WITH HYPERGLYCEMIA (HCC): ICD-10-CM

## 2019-11-07 DIAGNOSIS — R73.03 PRE-DIABETES: Primary | ICD-10-CM

## 2019-11-07 LAB
CHP ED QC CHECK: NORMAL
GLUCOSE BLD-MCNC: 363 MG/DL
HBA1C MFR BLD: 7.7 %
T4 FREE: 1.34 NG/DL (ref 0.84–1.68)
TSH SERPL DL<=0.05 MIU/L-ACNC: 1.07 UIU/ML (ref 0.44–3.86)

## 2019-11-07 PROCEDURE — 82962 GLUCOSE BLOOD TEST: CPT | Performed by: INTERNAL MEDICINE

## 2019-11-07 PROCEDURE — 99243 OFF/OP CNSLTJ NEW/EST LOW 30: CPT | Performed by: INTERNAL MEDICINE

## 2019-11-07 PROCEDURE — 83036 HEMOGLOBIN GLYCOSYLATED A1C: CPT | Performed by: INTERNAL MEDICINE

## 2019-11-07 RX ORDER — LANCETS 33 GAUGE
EACH MISCELLANEOUS
Qty: 100 EACH | Refills: 6 | Status: SHIPPED | OUTPATIENT
Start: 2019-11-07

## 2019-11-07 RX ORDER — INSULIN LISPRO 100 [IU]/ML
INJECTION, SUSPENSION SUBCUTANEOUS
Qty: 10 PEN | Refills: 3 | Status: SHIPPED | OUTPATIENT
Start: 2019-11-07 | End: 2019-12-19 | Stop reason: SDUPTHER

## 2019-11-07 RX ORDER — BLOOD-GLUCOSE METER
EACH MISCELLANEOUS
Qty: 1 KIT | Refills: 0 | Status: SHIPPED | OUTPATIENT
Start: 2019-11-07

## 2019-11-07 RX ORDER — LORAZEPAM 0.5 MG/1
TABLET ORAL
Refills: 0 | Status: ON HOLD | COMMUNITY
Start: 2019-10-07 | End: 2020-05-06

## 2019-11-08 ENCOUNTER — TELEPHONE (OUTPATIENT)
Dept: ENDOCRINOLOGY | Age: 39
End: 2019-11-08

## 2019-11-11 ENCOUNTER — HOSPITAL ENCOUNTER (OUTPATIENT)
Dept: RADIATION ONCOLOGY | Age: 39
Discharge: HOME | End: 2019-11-11
Payer: COMMERCIAL

## 2019-11-11 VITALS
TEMPERATURE: 96.6 F | DIASTOLIC BLOOD PRESSURE: 63 MMHG | OXYGEN SATURATION: 94 % | WEIGHT: 293 LBS | HEART RATE: 92 BPM | RESPIRATION RATE: 16 BRPM | HEIGHT: 71 IN | SYSTOLIC BLOOD PRESSURE: 132 MMHG | BODY MASS INDEX: 41.02 KG/M2

## 2019-11-11 DIAGNOSIS — C54.1 ENDOMETRIAL CANCER (HCC): ICD-10-CM

## 2019-11-11 DIAGNOSIS — C34.80 MALIGNANT NEOPLASM OF OVERLAPPING SITES OF LUNG, UNSPECIFIED LATERALITY (HCC): Primary | ICD-10-CM

## 2019-11-11 DIAGNOSIS — C79.31 LUNG CANCER METASTATIC TO BRAIN (HCC): ICD-10-CM

## 2019-11-11 DIAGNOSIS — C34.90 LUNG CANCER METASTATIC TO BRAIN (HCC): ICD-10-CM

## 2019-11-11 LAB — THYROID PEROXIDASE (TPO) ABS: 18.4 IU/ML (ref 0–35)

## 2019-11-11 PROCEDURE — 99212 OFFICE O/P EST SF 10 MIN: CPT | Performed by: RADIOLOGY

## 2019-12-05 ENCOUNTER — HOSPITAL ENCOUNTER (OUTPATIENT)
Dept: ULTRASOUND IMAGING | Age: 39
Discharge: HOME | End: 2019-12-07
Payer: COMMERCIAL

## 2019-12-05 DIAGNOSIS — E04.9 GOITER: ICD-10-CM

## 2019-12-05 PROCEDURE — 76536 US EXAM OF HEAD AND NECK: CPT

## 2019-12-19 ENCOUNTER — OFFICE VISIT (OUTPATIENT)
Dept: ENDOCRINOLOGY | Age: 39
End: 2019-12-19
Payer: COMMERCIAL

## 2019-12-19 VITALS
HEIGHT: 70 IN | RESPIRATION RATE: 18 BRPM | BODY MASS INDEX: 41.95 KG/M2 | DIASTOLIC BLOOD PRESSURE: 87 MMHG | SYSTOLIC BLOOD PRESSURE: 126 MMHG | OXYGEN SATURATION: 93 % | HEART RATE: 108 BPM | WEIGHT: 293 LBS

## 2019-12-19 DIAGNOSIS — E11.65 UNCONTROLLED TYPE 2 DIABETES MELLITUS WITH HYPERGLYCEMIA (HCC): Primary | ICD-10-CM

## 2019-12-19 LAB
CHP ED QC CHECK: NORMAL
GLUCOSE BLD-MCNC: 273 MG/DL

## 2019-12-19 PROCEDURE — 99213 OFFICE O/P EST LOW 20 MIN: CPT | Performed by: INTERNAL MEDICINE

## 2019-12-19 PROCEDURE — 82962 GLUCOSE BLOOD TEST: CPT | Performed by: INTERNAL MEDICINE

## 2019-12-19 RX ORDER — INSULIN LISPRO 100 [IU]/ML
INJECTION, SUSPENSION SUBCUTANEOUS
Qty: 15 PEN | Refills: 3 | Status: SHIPPED | OUTPATIENT
Start: 2019-12-19 | End: 2020-02-20 | Stop reason: SDUPTHER

## 2020-02-04 ENCOUNTER — OFFICE VISIT (OUTPATIENT)
Dept: PULMONOLOGY | Age: 40
End: 2020-02-04
Payer: COMMERCIAL

## 2020-02-04 VITALS
HEIGHT: 71 IN | TEMPERATURE: 98 F | BODY MASS INDEX: 41.02 KG/M2 | DIASTOLIC BLOOD PRESSURE: 88 MMHG | SYSTOLIC BLOOD PRESSURE: 136 MMHG | HEART RATE: 100 BPM | WEIGHT: 293 LBS | RESPIRATION RATE: 15 BRPM | OXYGEN SATURATION: 95 %

## 2020-02-04 PROCEDURE — 99214 OFFICE O/P EST MOD 30 MIN: CPT | Performed by: INTERNAL MEDICINE

## 2020-02-04 RX ORDER — BUDESONIDE AND FORMOTEROL FUMARATE DIHYDRATE 160; 4.5 UG/1; UG/1
2 AEROSOL RESPIRATORY (INHALATION) 2 TIMES DAILY
Qty: 1 INHALER | Refills: 5 | Status: SHIPPED | OUTPATIENT
Start: 2020-02-04

## 2020-02-04 NOTE — PROGRESS NOTES
Transportation needs:     Medical: Not on file     Non-medical: Not on file   Tobacco Use    Smoking status: Former Smoker     Packs/day: 1.50     Years: 18.00     Pack years: 27.00     Types: Cigarettes     Last attempt to quit: 2015     Years since quittin.0    Smokeless tobacco: Never Used   Substance and Sexual Activity    Alcohol use: Yes     Frequency: Never     Comment: socially    Drug use: Never    Sexual activity: Not Currently   Lifestyle    Physical activity:     Days per week: Not on file     Minutes per session: Not on file    Stress: Not on file   Relationships    Social connections:     Talks on phone: Not on file     Gets together: Not on file     Attends Synagogue service: Not on file     Active member of club or organization: Not on file     Attends meetings of clubs or organizations: Not on file     Relationship status: Not on file    Intimate partner violence:     Fear of current or ex partner: Not on file     Emotionally abused: Not on file     Physically abused: Not on file     Forced sexual activity: Not on file   Other Topics Concern    Not on file   Social History Narrative    Not on file         Review of Systems      ROS: 10 organs review of system is done including general, psychological, ENT, hematological, endocrine, respiratory, cardiovascular, gastrointestinal,musculoskeletal, neurological,  allergy and Immunology is done and is otherwise negative.     Current Outpatient Medications   Medication Sig Dispense Refill    budesonide-formoterol (SYMBICORT) 160-4.5 MCG/ACT AERO Inhale 2 puffs into the lungs 2 times daily 1 Inhaler 5    insulin lispro protamine & lispro (HUMALOG MIX 75/25 KWIKPEN) (75-25) 100 UNIT per ML SUPN injection pen 30 units twice a day 15 pen 3    Insulin Pen Needle (NOVOFINE) 32G X 6 MM MISC Bid 300 each 3    LORazepam (ATIVAN) 0.5 MG tablet TAKE 1 TABLET BY MOUTH 30 MINUTES PRIOR TO MRI; MAY REPEAT DOSE IN 20 MINUTES IF NEEDED.  0    metFORMIN (GLUCOPHAGE) 500 MG tablet Take 1 tablet by mouth 2 times daily (with meals) 60 tablet 3    blood glucose test strips (ONETOUCH VERIO) strip 1 each by In Vitro route 2 times daily As needed. 100 each 3    Blood Glucose Monitoring Suppl (ONETOUCH VERIO) w/Device KIT As directed 1 kit 0    Lancets (ONETOUCH DELICA PLUS [id number]) MISC bid 100 each 06    pantoprazole (PROTONIX) 40 MG tablet Take 1 tablet by mouth every morning (before breakfast) 30 tablet 1    dexamethasone (DECADRON) 4 MG tablet take 1 tablet by mouth twice a day  0    VARUBI 90 MG TABS       apixaban (ELIQUIS STARTER PACK) 5 MG TABS tablet Take 10 mg (2 tablets) orally twice daily for 7 days, then take 5 mg (1 tablet) orally twice daily thereafter. 74 tablet 0    citalopram (CELEXA) 10 MG tablet Take 10 mg by mouth nightly        No current facility-administered medications for this visit. Objective:     Vitals:    02/04/20 1404   BP: 136/88   Site: Left Upper Arm   Position: Sitting   Pulse: 100   Resp: 15   Temp: 98 °F (36.7 °C)   TempSrc: Oral   SpO2: 95%   Weight: (!) 331 lb (150.1 kg)   Height: 5' 11\" (1.803 m)         Physical Exam  Constitutional:       General: She is not in acute distress. Appearance: She is well-developed. She is not diaphoretic. HENT:      Head: Normocephalic and atraumatic. Eyes:      Conjunctiva/sclera: Conjunctivae normal.      Pupils: Pupils are equal, round, and reactive to light. Neck:      Musculoskeletal: Normal range of motion and neck supple. Cardiovascular:      Rate and Rhythm: Normal rate and regular rhythm. Heart sounds: No murmur. No friction rub. No gallop. Pulmonary:      Effort: Pulmonary effort is normal. No respiratory distress. Breath sounds: Normal breath sounds. No wheezing or rales. Chest:      Chest wall: No tenderness. Abdominal:      General: There is no distension. Palpations: Abdomen is soft. Tenderness: There is no abdominal tenderness. expectations have been discussed with the patient who expresses understanding and desires to proceed.   ' Patient instructed to gargle swish and spit after each use of inhaled corticosteroids      Return in about 6 months (around 8/4/2020).       Mark Betts MD

## 2020-02-14 ENCOUNTER — TELEPHONE (OUTPATIENT)
Dept: ENDOCRINOLOGY | Age: 40
End: 2020-02-14

## 2020-02-14 DIAGNOSIS — E11.65 UNCONTROLLED TYPE 2 DIABETES MELLITUS WITH HYPERGLYCEMIA (HCC): ICD-10-CM

## 2020-02-14 LAB
ANION GAP SERPL CALCULATED.3IONS-SCNC: 16 MEQ/L (ref 9–15)
BUN BLDV-MCNC: 26 MG/DL (ref 6–20)
CALCIUM SERPL-MCNC: 9.4 MG/DL (ref 8.5–9.9)
CHLORIDE BLD-SCNC: 96 MEQ/L (ref 95–107)
CO2: 26 MEQ/L (ref 20–31)
CREAT SERPL-MCNC: 0.65 MG/DL (ref 0.5–0.9)
CREATININE URINE: 40.8 MG/DL
GFR AFRICAN AMERICAN: >60
GFR NON-AFRICAN AMERICAN: >60
GLUCOSE BLD-MCNC: 552 MG/DL (ref 70–99)
HBA1C MFR BLD: 10.2 % (ref 4.8–5.9)
MICROALBUMIN UR-MCNC: <1.2 MG/DL
MICROALBUMIN/CREAT UR-RTO: NORMAL MG/G (ref 0–30)
POTASSIUM SERPL-SCNC: 5 MEQ/L (ref 3.4–4.9)
SODIUM BLD-SCNC: 138 MEQ/L (ref 135–144)

## 2020-02-20 ENCOUNTER — OFFICE VISIT (OUTPATIENT)
Dept: ENDOCRINOLOGY | Age: 40
End: 2020-02-20
Payer: COMMERCIAL

## 2020-02-20 VITALS
RESPIRATION RATE: 16 BRPM | OXYGEN SATURATION: 95 % | BODY MASS INDEX: 41.02 KG/M2 | WEIGHT: 293 LBS | HEIGHT: 71 IN | HEART RATE: 94 BPM | SYSTOLIC BLOOD PRESSURE: 128 MMHG | DIASTOLIC BLOOD PRESSURE: 86 MMHG

## 2020-02-20 LAB
CHP ED QC CHECK: NORMAL
GLUCOSE BLD-MCNC: 361 MG/DL

## 2020-02-20 PROCEDURE — 99213 OFFICE O/P EST LOW 20 MIN: CPT | Performed by: INTERNAL MEDICINE

## 2020-02-20 PROCEDURE — 82962 GLUCOSE BLOOD TEST: CPT | Performed by: INTERNAL MEDICINE

## 2020-02-20 RX ORDER — INSULIN LISPRO 100 [IU]/ML
INJECTION, SUSPENSION SUBCUTANEOUS
Qty: 15 PEN | Refills: 3 | Status: ON HOLD | OUTPATIENT
Start: 2020-02-20 | End: 2020-05-06

## 2020-02-25 NOTE — PROGRESS NOTES
Subjective:      Patient ID: Lonnie Lowery is a 45 y.o. female. 2-month follow-up on diabetes A1c is up to 10.2 from 7.7 had labs done recently glucose was over 500  Diabetes   She presents for her follow-up diabetic visit. She has type 2 diabetes mellitus. Symptoms are worsening. Current diabetic treatment includes insulin injections (75/25 Humalog plus metformin). She is currently taking insulin pre-breakfast and pre-dinner. Her overall blood glucose range is >200 mg/dl. (Lab Results       Component                Value               Date                       LABA1C                   10.2 (H)            02/14/2020              )     Morbid obesity Body mass index is 46.86 kg/m². Results for Reanna Fleming (MRN [id number]) as of 2/25/2020 10:05   Ref. Range 11/7/2019 16:08 2/14/2020 11:54   Hemoglobin A1C Latest Ref Range: 4.8 - 5.9 % 7.7 10.2 (H)       Results for Reanna Fleming (MRN [id number]) as of 2/25/2020 10:05   Ref.  Range 12/19/2019 15:31 2/14/2020 11:54 2/14/2020 11:56   Sodium Latest Ref Range: 135 - 144 mEq/L  138    Potassium Latest Ref Range: 3.4 - 4.9 mEq/L  5.0 (H)    Chloride Latest Ref Range: 95 - 107 mEq/L  96    CO2 Latest Ref Range: 20 - 31 mEq/L  26    BUN Latest Ref Range: 6 - 20 mg/dL  26 (H)    Creatinine Latest Ref Range: 0.50 - 0.90 mg/dL  0.65    Anion Gap Latest Ref Range: 9 - 15 mEq/L  16 (H)    GFR Non- Latest Ref Range: >60   >60.0    GFR African American Latest Ref Range: >60   >60.0    Glucose Latest Ref Range: 70 - 99 mg/dL 273 552 (HH)    Calcium Latest Ref Range: 8.5 - 9.9 mg/dL  9.4    Hemoglobin A1C Latest Ref Range: 4.8 - 5.9 %  10.2 (H)    Creatinine, Ur Latest Ref Range: Not Established mg/dL   40.8   Microalbumin Creatinine Ratio Latest Ref Range: 0.0 - 30.0 mg/G   see below   Microalbumin, Random Urine Latest Ref Range: Not Established mg/dL   <1.20         Patient Active Problem List   Diagnosis    Lung mass    H/O: hysterectomy   

## 2020-03-04 ENCOUNTER — APPOINTMENT (OUTPATIENT)
Dept: CT IMAGING | Age: 40
End: 2020-03-04
Payer: COMMERCIAL

## 2020-03-04 ENCOUNTER — HOSPITAL ENCOUNTER (EMERGENCY)
Age: 40
Discharge: HOME | End: 2020-03-04
Attending: EMERGENCY MEDICINE
Payer: COMMERCIAL

## 2020-03-04 VITALS
HEART RATE: 96 BPM | RESPIRATION RATE: 32 BRPM | DIASTOLIC BLOOD PRESSURE: 82 MMHG | TEMPERATURE: 99.1 F | OXYGEN SATURATION: 93 % | SYSTOLIC BLOOD PRESSURE: 132 MMHG | WEIGHT: 293 LBS | HEIGHT: 70 IN | BODY MASS INDEX: 41.95 KG/M2

## 2020-03-04 LAB
ALBUMIN SERPL-MCNC: 3.3 G/DL (ref 3.5–4.6)
ALP BLD-CCNC: 67 U/L (ref 40–130)
ALT SERPL-CCNC: 56 U/L (ref 0–33)
ANION GAP SERPL CALCULATED.3IONS-SCNC: 18 MEQ/L (ref 9–15)
AST SERPL-CCNC: 29 U/L (ref 0–35)
BASOPHILS ABSOLUTE: 0.1 K/UL (ref 0–0.2)
BASOPHILS RELATIVE PERCENT: 1 %
BILIRUB SERPL-MCNC: <0.2 MG/DL (ref 0.2–0.7)
BUN BLDV-MCNC: 25 MG/DL (ref 6–20)
CALCIUM SERPL-MCNC: 8.8 MG/DL (ref 8.5–9.9)
CHLORIDE BLD-SCNC: 94 MEQ/L (ref 95–107)
CO2: 22 MEQ/L (ref 20–31)
CREAT SERPL-MCNC: 0.78 MG/DL (ref 0.5–0.9)
EKG ATRIAL RATE: 101 BPM
EKG P AXIS: 47 DEGREES
EKG P-R INTERVAL: 132 MS
EKG Q-T INTERVAL: 370 MS
EKG QRS DURATION: 92 MS
EKG QTC CALCULATION (BAZETT): 479 MS
EKG R AXIS: 88 DEGREES
EKG T AXIS: 69 DEGREES
EKG VENTRICULAR RATE: 101 BPM
EOSINOPHILS ABSOLUTE: 0.1 K/UL (ref 0–0.7)
EOSINOPHILS RELATIVE PERCENT: 0.6 %
GFR AFRICAN AMERICAN: >60
GFR NON-AFRICAN AMERICAN: >60
GLOBULIN: 3.7 G/DL (ref 2.3–3.5)
GLUCOSE BLD-MCNC: 415 MG/DL (ref 70–99)
HCT VFR BLD CALC: 41.2 % (ref 37–47)
HEMOGLOBIN: 13.4 G/DL (ref 12–16)
INR BLD: 1
LACTIC ACID: 5.4 MMOL/L (ref 0.5–2.2)
LYMPHOCYTES ABSOLUTE: 0.9 K/UL (ref 1–4.8)
LYMPHOCYTES RELATIVE PERCENT: 7.8 %
MCH RBC QN AUTO: 27.6 PG (ref 27–31.3)
MCHC RBC AUTO-ENTMCNC: 32.5 % (ref 33–37)
MCV RBC AUTO: 85.1 FL (ref 82–100)
MONOCYTES ABSOLUTE: 0.7 K/UL (ref 0.2–0.8)
MONOCYTES RELATIVE PERCENT: 6 %
NEUTROPHILS ABSOLUTE: 9.4 K/UL (ref 1.4–6.5)
NEUTROPHILS RELATIVE PERCENT: 84.6 %
PDW BLD-RTO: 16.2 % (ref 11.5–14.5)
PLATELET # BLD: 228 K/UL (ref 130–400)
POC CREATININE WHOLE BLOOD: 0.7
POTASSIUM SERPL-SCNC: 4.6 MEQ/L (ref 3.4–4.9)
PROTHROMBIN TIME: 13.7 SEC (ref 12.3–14.9)
RBC # BLD: 4.83 M/UL (ref 4.2–5.4)
SODIUM BLD-SCNC: 134 MEQ/L (ref 135–144)
TOTAL PROTEIN: 7 G/DL (ref 6.3–8)
WBC # BLD: 11.1 K/UL (ref 4.8–10.8)

## 2020-03-04 PROCEDURE — 83605 ASSAY OF LACTIC ACID: CPT

## 2020-03-04 PROCEDURE — 36415 COLL VENOUS BLD VENIPUNCTURE: CPT

## 2020-03-04 PROCEDURE — 87040 BLOOD CULTURE FOR BACTERIA: CPT

## 2020-03-04 PROCEDURE — 99285 EMERGENCY DEPT VISIT HI MDM: CPT

## 2020-03-04 PROCEDURE — 71275 CT ANGIOGRAPHY CHEST: CPT

## 2020-03-04 PROCEDURE — 85025 COMPLETE CBC W/AUTO DIFF WBC: CPT

## 2020-03-04 PROCEDURE — 2580000003 HC RX 258: Performed by: EMERGENCY MEDICINE

## 2020-03-04 PROCEDURE — 93005 ELECTROCARDIOGRAM TRACING: CPT | Performed by: EMERGENCY MEDICINE

## 2020-03-04 PROCEDURE — 85610 PROTHROMBIN TIME: CPT

## 2020-03-04 PROCEDURE — 80053 COMPREHEN METABOLIC PANEL: CPT

## 2020-03-04 PROCEDURE — 96375 TX/PRO/DX INJ NEW DRUG ADDON: CPT

## 2020-03-04 PROCEDURE — 6370000000 HC RX 637 (ALT 250 FOR IP): Performed by: EMERGENCY MEDICINE

## 2020-03-04 PROCEDURE — 6360000002 HC RX W HCPCS: Performed by: EMERGENCY MEDICINE

## 2020-03-04 PROCEDURE — 6360000004 HC RX CONTRAST MEDICATION: Performed by: EMERGENCY MEDICINE

## 2020-03-04 PROCEDURE — 96374 THER/PROPH/DIAG INJ IV PUSH: CPT

## 2020-03-04 RX ORDER — METHYLPREDNISOLONE SODIUM SUCCINATE 125 MG/2ML
125 INJECTION, POWDER, LYOPHILIZED, FOR SOLUTION INTRAMUSCULAR; INTRAVENOUS ONCE
Status: COMPLETED | OUTPATIENT
Start: 2020-03-04 | End: 2020-03-04

## 2020-03-04 RX ORDER — 0.9 % SODIUM CHLORIDE 0.9 %
1000 INTRAVENOUS SOLUTION INTRAVENOUS ONCE
Status: COMPLETED | OUTPATIENT
Start: 2020-03-04 | End: 2020-03-04

## 2020-03-04 RX ADMIN — METHYLPREDNISOLONE SODIUM SUCCINATE 125 MG: 125 INJECTION, POWDER, FOR SOLUTION INTRAMUSCULAR; INTRAVENOUS at 13:19

## 2020-03-04 RX ADMIN — INSULIN HUMAN 10 UNITS: 100 INJECTION, SOLUTION PARENTERAL at 12:50

## 2020-03-04 RX ADMIN — IOPAMIDOL 100 ML: 755 INJECTION, SOLUTION INTRAVENOUS at 12:20

## 2020-03-04 RX ADMIN — SODIUM CHLORIDE 1000 ML: 9 INJECTION, SOLUTION INTRAVENOUS at 11:47

## 2020-03-04 ASSESSMENT — ENCOUNTER SYMPTOMS
CHEST TIGHTNESS: 0
SHORTNESS OF BREATH: 1
ABDOMINAL PAIN: 0
VOMITING: 0
COUGH: 1
NAUSEA: 0
SORE THROAT: 0
EYE PAIN: 0

## 2020-03-04 NOTE — ED PROVIDER NOTES
breakfast)    VARUBI 90 MG TABS           ALLERGIES     Pcn [penicillins]; Sulfa antibiotics; and Sulfites    FAMILY HISTORY       Family History   Problem Relation Age of Onset    Cancer Mother         lymphoma    Diabetes Mother     No Known Problems Father     Diabetes Maternal Grandmother         questionable female cancer    Heart Disease Maternal Grandmother     Cancer Paternal Grandfather         stomach          SOCIAL HISTORY       Social History     Socioeconomic History    Marital status: Single     Spouse name: None    Number of children: None    Years of education: None    Highest education level: None   Occupational History    None   Social Needs    Financial resource strain: None    Food insecurity:     Worry: None     Inability: None    Transportation needs:     Medical: None     Non-medical: None   Tobacco Use    Smoking status: Former Smoker     Packs/day: 1.50     Years: 18.00     Pack years: 27.00     Types: Cigarettes     Last attempt to quit:      Years since quittin.1    Smokeless tobacco: Never Used   Substance and Sexual Activity    Alcohol use: Yes     Frequency: Never     Comment: socially    Drug use: Never    Sexual activity: Not Currently   Lifestyle    Physical activity:     Days per week: None     Minutes per session: None    Stress: None   Relationships    Social connections:     Talks on phone: None     Gets together: None     Attends Faith service: None     Active member of club or organization: None     Attends meetings of clubs or organizations: None     Relationship status: None    Intimate partner violence:     Fear of current or ex partner: None     Emotionally abused: None     Physically abused: None     Forced sexual activity: None   Other Topics Concern    None   Social History Narrative    None       SCREENINGS              PHYSICAL EXAM    (up to 7 for level 4, 8 or more for level 5)     ED Triage Vitals [20 1109]   BP Temp Temp findings:        Interpretation per the Radiologist below, if available at the time ofthis note:    CTA Chest W WO  (PE study)   Final Result      No pulmonary embolism. Numerous large bilateral lung masses are again identified. Many of these have mildly increased in size during the interval. Mild interval enlargement of mediastinal and bilateral hilar lymph nodes. Hepatosplenomegaly. Hepatic steatosis. All CT scans at this facility use dose modulation, iterative reconstruction, and/or weight based dosing when appropriate to reduce radiation dose to as low as reasonably achievable. ED BEDSIDE ULTRASOUND:   Performed by ED Physician - none    LABS:  Labs Reviewed   CBC WITH AUTO DIFFERENTIAL - Abnormal; Notable for the following components:       Result Value    WBC 11.1 (*)     MCHC 32.5 (*)     RDW 16.2 (*)     Neutrophils Absolute 9.4 (*)     Lymphocytes Absolute 0.9 (*)     All other components within normal limits   COMPREHENSIVE METABOLIC PANEL - Abnormal; Notable for the following components:    Sodium 134 (*)     Chloride 94 (*)     Anion Gap 18 (*)     Glucose 415 (*)     BUN 25 (*)     Alb 3.3 (*)     ALT 56 (*)     Globulin 3.7 (*)     All other components within normal limits    Narrative:     CALL  [facility] tel. [phone],  Glucose called to Arcelia Hernandez, 03/04/2020 12:28, by ERIBERTO   LACTIC ACID, PLASMA - Abnormal; Notable for the following components:    Lactic Acid 5.4 (*)     All other components within normal limits    Narrative:     CALL  [facility] tel. [phone],  Lactic Acid called to Arcelia Hernandez, 03/04/2020 12:27, by ERIBERTO   POCT CREATININE - URINE - Normal   CULTURE, BLOOD 2   CULTURE, BLOOD 1   PROTIME-INR       All other labs were within normal range or not returned as of this dictation.     EMERGENCY DEPARTMENT COURSE and DIFFERENTIAL DIAGNOSIS/MDM:   Vitals:    Vitals:    03/04/20 1206 03/04/20 1245 03/04/20 1254 03/04/20 1316   BP:   (!) 124/94 132/82 Pulse:   107 96   Resp:   27 (!) 32   Temp:       TempSrc:       SpO2: 93% 94% 93% 93%   Weight:       Height:           Patient came in 2 days after coughing up a blood clot. Patient is on Eliquis. Patient also is on immunotherapy for her lung cancer. CAT scan did not show pulmonary embolism and only showed worsening of her multiple lung masses. I did discuss the case with Dr Sayda Ching and Dr Page Odom. They do not feel the patient needs antibiotics or any more steroids because she does not have pneumonitis. Dr. Page Odom will see her in the office in 2 days to discuss her lung cancer. MDM      REASSESSMENT          CRITICAL CARE TIME   Total Critical Care time was 31 minutes, excluding separatelyreportable procedures. There was a high probability ofclinically significant/life threatening deterioration in the patient's condition which required my urgent intervention. CONSULTS:  None    PROCEDURES:  Unless otherwise noted below, none     Procedures    FINAL IMPRESSION      1. Hemoptysis          DISPOSITION/PLAN   DISPOSITION Decision To Discharge 03/04/2020 01:57:19 PM      PATIENT REFERREDTO:  Siobhan Larios MD  [address] [place] [address]  [phone]    In 2 days  Friday 3:15 PM      DISCHARGEMEDICATIONS:  New Prescriptions    No medications on file     Controlled Substances Monitoring:     No flowsheet data found.     (Please note that portions of this note were completed with a voice recognition program.  Efforts were made to edit the dictations but occasionally words are mis-transcribed.)    Fareed Malhotra DO (electronically signed)  Attending Emergency Physician          Fareed Malhotra DO  03/04/20 143 Wendy Billingsley DO  03/04/20 2596

## 2020-03-04 NOTE — ED NOTES
Pt sitting upright on cart at this time awaiting results. 0 complaints at this time. Offered warm blanket and patient declined at this time.        Miri Swain RN  03/04/20 [id number]

## 2020-03-04 NOTE — ED TRIAGE NOTES
Pt a/o x 3 skin pink w/d resp non labored. Pt sent by Dr Mireya Crawley office after she reported coughing up blood clot. Pt denies pain. pt does report increased sob then normal.lungs clear. pt has lung and brain ca.

## 2020-03-04 NOTE — ED NOTES
Labs obtained via Pic[facility] line   Labs sent  Lab notified of needing second set of cultures     Everton Freeman RN  03/04/20 2025

## 2020-03-05 LAB
GFR AFRICAN AMERICAN: >60
GFR NON-AFRICAN AMERICAN: >60
PERFORMED ON: NORMAL
POC CREATININE: 0.7 MG/DL (ref 0.6–1.1)
POC SAMPLE TYPE: NORMAL

## 2020-03-05 PROCEDURE — 93010 ELECTROCARDIOGRAM REPORT: CPT | Performed by: INTERNAL MEDICINE

## 2020-03-06 ENCOUNTER — OFFICE VISIT (OUTPATIENT)
Dept: PULMONOLOGY | Age: 40
End: 2020-03-06
Payer: COMMERCIAL

## 2020-03-06 VITALS
DIASTOLIC BLOOD PRESSURE: 82 MMHG | WEIGHT: 293 LBS | OXYGEN SATURATION: 93 % | RESPIRATION RATE: 16 BRPM | TEMPERATURE: 97 F | HEART RATE: 105 BPM | BODY MASS INDEX: 41.02 KG/M2 | HEIGHT: 71 IN | SYSTOLIC BLOOD PRESSURE: 128 MMHG

## 2020-03-06 PROCEDURE — 99214 OFFICE O/P EST MOD 30 MIN: CPT | Performed by: INTERNAL MEDICINE

## 2020-03-09 ENCOUNTER — TELEPHONE (OUTPATIENT)
Dept: PULMONOLOGY | Age: 40
End: 2020-03-09

## 2020-03-09 LAB
BLOOD CULTURE, ROUTINE: NORMAL
CULTURE, BLOOD 2: NORMAL

## 2020-03-09 NOTE — TELEPHONE ENCOUNTER
Patient called states she did not cough any blood this weekend, you told her to call and let you know and then you would adjust her medication, please advise

## 2020-04-03 ENCOUNTER — VIRTUAL VISIT (OUTPATIENT)
Dept: PULMONOLOGY | Age: 40
End: 2020-04-03
Payer: COMMERCIAL

## 2020-04-03 PROCEDURE — 99442 PR PHYS/QHP TELEPHONE EVALUATION 11-20 MIN: CPT | Performed by: INTERNAL MEDICINE

## 2020-04-03 NOTE — PROGRESS NOTES
4/3/2020    TELEHEALTH EVALUATION -- Audio/Visual (During AJTTZ-10 public health emergency)    Due to COVID 19 outbreak, patient's office visit was converted to a virtual visit. Patient was contacted and agreed to proceed with a virtual visit via Telephone Visit  The risks and benefits of converting to a virtual visit were discussed in light of the current infectious disease epidemic. Patient also understood that insurance coverage and co-pays are up to their individual insurance plans. HPI:    Araseli Morfin (:  1980) has requested an audio/video evaluation for the following concern(s):    Spoke with the patient over the phone, hemoptysis resolved, no further episodes of spitting blood, no fever, she does have dyspnea on exertion mainly with Opdivo treatment but mild, she has cough, she continues to take Eliquis, compliant with her medications, sleeps good, minimal lower extremity edema she is on Lasix as needed. Staying home and practicing good social distancing. Review of Systems    Prior to Visit Medications    Medication Sig Taking? Authorizing Provider   insulin aspart protamine-insulin aspart (NOVOLOG MIX 70/30 FLEXPEN) (70-30) 100 UNIT/ML injection Inject 45-55 units BID  Kieran Morris MD   metFORMIN (GLUCOPHAGE) 500 MG tablet take 1 tablet by mouth twice a day with meals  Km Mccray MD   insulin lispro protamine & lispro (HUMALOG MIX 75/25 KWIKPEN) (75-25) 100 UNIT per ML SUPN injection pen 45-55 units twice a day  Km Mccray MD   budesonide-formoterol (SYMBICORT) 160-4.5 MCG/ACT AERO Inhale 2 puffs into the lungs 2 times daily  Chantal Trinidad MD   Insulin Pen Needle (NOVOFINE) 32G X 6 MM MISC Bid  Kieran Morris MD   LORazepam (ATIVAN) 0.5 MG tablet TAKE 1 TABLET BY MOUTH 30 MINUTES PRIOR TO MRI; MAY REPEAT DOSE IN 20 MINUTES IF NEEDED. Historical Provider, MD   blood glucose test strips (ONETOUCH VERIO) strip 1 each by In Vitro route 2 times daily As needed.   Km Mccray MD   Blood

## 2020-05-06 ENCOUNTER — NURSE ONLY (OUTPATIENT)
Dept: PRIMARY CARE CLINIC | Age: 40
End: 2020-05-06

## 2020-05-06 ENCOUNTER — APPOINTMENT (OUTPATIENT)
Dept: CT IMAGING | Age: 40
DRG: 180 | End: 2020-05-06
Payer: COMMERCIAL

## 2020-05-06 ENCOUNTER — HOSPITAL ENCOUNTER (INPATIENT)
Age: 40
LOS: 2 days | Discharge: HOME | DRG: 180 | End: 2020-05-08
Attending: INTERNAL MEDICINE | Admitting: INTERNAL MEDICINE
Payer: COMMERCIAL

## 2020-05-06 ENCOUNTER — APPOINTMENT (OUTPATIENT)
Dept: GENERAL RADIOLOGY | Age: 40
DRG: 180 | End: 2020-05-06
Payer: COMMERCIAL

## 2020-05-06 DIAGNOSIS — Z01.818 ENCOUNTER FOR PREADMISSION TESTING: ICD-10-CM

## 2020-05-06 PROBLEM — J18.9 PNA (PNEUMONIA): Status: ACTIVE | Noted: 2020-05-06

## 2020-05-06 LAB
ALBUMIN SERPL-MCNC: 2.9 G/DL (ref 3.5–4.6)
ALP BLD-CCNC: 72 U/L (ref 40–130)
ALT SERPL-CCNC: 14 U/L (ref 0–33)
ANION GAP SERPL CALCULATED.3IONS-SCNC: 13 MEQ/L (ref 9–15)
AST SERPL-CCNC: 19 U/L (ref 0–35)
BASOPHILS ABSOLUTE: 0 K/UL (ref 0–0.2)
BASOPHILS RELATIVE PERCENT: 0.3 %
BILIRUB SERPL-MCNC: 0.4 MG/DL (ref 0.2–0.7)
BUN BLDV-MCNC: 7 MG/DL (ref 6–20)
CALCIUM SERPL-MCNC: 8.4 MG/DL (ref 8.5–9.9)
CHLORIDE BLD-SCNC: 95 MEQ/L (ref 95–107)
CO2: 27 MEQ/L (ref 20–31)
CREAT SERPL-MCNC: 0.66 MG/DL (ref 0.5–0.9)
EKG ATRIAL RATE: 105 BPM
EKG P AXIS: 42 DEGREES
EKG P-R INTERVAL: 140 MS
EKG Q-T INTERVAL: 392 MS
EKG QRS DURATION: 92 MS
EKG QTC CALCULATION (BAZETT): 518 MS
EKG R AXIS: 75 DEGREES
EKG T AXIS: 54 DEGREES
EKG VENTRICULAR RATE: 105 BPM
EOSINOPHILS ABSOLUTE: 0.1 K/UL (ref 0–0.7)
EOSINOPHILS RELATIVE PERCENT: 2.3 %
GFR AFRICAN AMERICAN: >60
GFR NON-AFRICAN AMERICAN: >60
GLOBULIN: 3.2 G/DL (ref 2.3–3.5)
GLUCOSE BLD-MCNC: 197 MG/DL (ref 70–99)
GLUCOSE BLD-MCNC: 278 MG/DL (ref 60–115)
HBA1C MFR BLD: 9.8 % (ref 4.8–5.9)
HCT VFR BLD CALC: 53 % (ref 37–47)
HEMOGLOBIN: 17.4 G/DL (ref 12–16)
LACTIC ACID: 2 MMOL/L (ref 0.5–2.2)
LYMPHOCYTES ABSOLUTE: 0.4 K/UL (ref 1–4.8)
LYMPHOCYTES RELATIVE PERCENT: 7.4 %
MAGNESIUM: 1.2 MG/DL (ref 1.7–2.4)
MCH RBC QN AUTO: 27.9 PG (ref 27–31.3)
MCHC RBC AUTO-ENTMCNC: 32.9 % (ref 33–37)
MCV RBC AUTO: 84.8 FL (ref 82–100)
MONOCYTES ABSOLUTE: 0.2 K/UL (ref 0.2–0.8)
MONOCYTES RELATIVE PERCENT: 5.1 %
NEUTROPHILS ABSOLUTE: 4 K/UL (ref 1.4–6.5)
NEUTROPHILS RELATIVE PERCENT: 84.9 %
PDW BLD-RTO: 17.1 % (ref 11.5–14.5)
PERFORMED ON: ABNORMAL
PLATELET # BLD: 179 K/UL (ref 130–400)
POTASSIUM SERPL-SCNC: 3.2 MEQ/L (ref 3.4–4.9)
PRO-BNP: 108 PG/ML
PROCALCITONIN: 0.41 NG/ML (ref 0–0.15)
RBC # BLD: 6.24 M/UL (ref 4.2–5.4)
SARS-COV-2, NAAT: NOT DETECTED
SODIUM BLD-SCNC: 135 MEQ/L (ref 135–144)
TOTAL PROTEIN: 6.1 G/DL (ref 6.3–8)
TROPONIN: 0.01 NG/ML (ref 0–0.01)
WBC # BLD: 4.7 K/UL (ref 4.8–10.8)

## 2020-05-06 PROCEDURE — 87040 BLOOD CULTURE FOR BACTERIA: CPT

## 2020-05-06 PROCEDURE — 80053 COMPREHEN METABOLIC PANEL: CPT

## 2020-05-06 PROCEDURE — 84145 PROCALCITONIN (PCT): CPT

## 2020-05-06 PROCEDURE — 83735 ASSAY OF MAGNESIUM: CPT

## 2020-05-06 PROCEDURE — 6370000000 HC RX 637 (ALT 250 FOR IP): Performed by: INTERNAL MEDICINE

## 2020-05-06 PROCEDURE — U0003 INFECTIOUS AGENT DETECTION BY NUCLEIC ACID (DNA OR RNA); SEVERE ACUTE RESPIRATORY SYNDROME CORONAVIRUS 2 (SARS-COV-2) (CORONAVIRUS DISEASE [COVID-19]), AMPLIFIED PROBE TECHNIQUE, MAKING USE OF HIGH THROUGHPUT TECHNOLOGIES AS DESCRIBED BY CMS-2020-01-R: HCPCS

## 2020-05-06 PROCEDURE — 83605 ASSAY OF LACTIC ACID: CPT

## 2020-05-06 PROCEDURE — 96375 TX/PRO/DX INJ NEW DRUG ADDON: CPT

## 2020-05-06 PROCEDURE — 2580000003 HC RX 258: Performed by: NURSE PRACTITIONER

## 2020-05-06 PROCEDURE — 6360000002 HC RX W HCPCS: Performed by: INTERNAL MEDICINE

## 2020-05-06 PROCEDURE — 99285 EMERGENCY DEPT VISIT HI MDM: CPT

## 2020-05-06 PROCEDURE — 71275 CT ANGIOGRAPHY CHEST: CPT

## 2020-05-06 PROCEDURE — 93005 ELECTROCARDIOGRAM TRACING: CPT | Performed by: NURSE PRACTITIONER

## 2020-05-06 PROCEDURE — 84484 ASSAY OF TROPONIN QUANT: CPT

## 2020-05-06 PROCEDURE — 36415 COLL VENOUS BLD VENIPUNCTURE: CPT

## 2020-05-06 PROCEDURE — 83036 HEMOGLOBIN GLYCOSYLATED A1C: CPT

## 2020-05-06 PROCEDURE — U0002 COVID-19 LAB TEST NON-CDC: HCPCS

## 2020-05-06 PROCEDURE — 1210000000 HC MED SURG R&B

## 2020-05-06 PROCEDURE — 71045 X-RAY EXAM CHEST 1 VIEW: CPT

## 2020-05-06 PROCEDURE — 85025 COMPLETE CBC W/AUTO DIFF WBC: CPT

## 2020-05-06 PROCEDURE — 2580000003 HC RX 258: Performed by: INTERNAL MEDICINE

## 2020-05-06 PROCEDURE — 6360000004 HC RX CONTRAST MEDICATION: Performed by: NURSE PRACTITIONER

## 2020-05-06 PROCEDURE — 96365 THER/PROPH/DIAG IV INF INIT: CPT

## 2020-05-06 PROCEDURE — 6360000002 HC RX W HCPCS: Performed by: NURSE PRACTITIONER

## 2020-05-06 PROCEDURE — 83880 ASSAY OF NATRIURETIC PEPTIDE: CPT

## 2020-05-06 RX ORDER — NICOTINE POLACRILEX 4 MG
15 LOZENGE BUCCAL PRN
Status: DISCONTINUED | OUTPATIENT
Start: 2020-05-06 | End: 2020-05-08 | Stop reason: HOSPADM

## 2020-05-06 RX ORDER — POTASSIUM CHLORIDE 20 MEQ/1
40 TABLET, EXTENDED RELEASE ORAL ONCE
Status: COMPLETED | OUTPATIENT
Start: 2020-05-07 | End: 2020-05-07

## 2020-05-06 RX ORDER — DIPHENHYDRAMINE HYDROCHLORIDE 50 MG/ML
50 INJECTION INTRAMUSCULAR; INTRAVENOUS ONCE
Status: COMPLETED | OUTPATIENT
Start: 2020-05-06 | End: 2020-05-06

## 2020-05-06 RX ORDER — LEVOFLOXACIN 5 MG/ML
500 INJECTION, SOLUTION INTRAVENOUS EVERY 24 HOURS
Status: DISCONTINUED | OUTPATIENT
Start: 2020-05-06 | End: 2020-05-08 | Stop reason: HOSPADM

## 2020-05-06 RX ORDER — IPRATROPIUM BROMIDE AND ALBUTEROL SULFATE 2.5; .5 MG/3ML; MG/3ML
1 SOLUTION RESPIRATORY (INHALATION) EVERY 4 HOURS PRN
Status: DISCONTINUED | OUTPATIENT
Start: 2020-05-06 | End: 2020-05-07

## 2020-05-06 RX ORDER — DEXTROSE MONOHYDRATE 25 G/50ML
12.5 INJECTION, SOLUTION INTRAVENOUS PRN
Status: DISCONTINUED | OUTPATIENT
Start: 2020-05-06 | End: 2020-05-08 | Stop reason: HOSPADM

## 2020-05-06 RX ORDER — CITALOPRAM 10 MG/1
10 TABLET ORAL NIGHTLY
Status: DISCONTINUED | OUTPATIENT
Start: 2020-05-07 | End: 2020-05-08 | Stop reason: HOSPADM

## 2020-05-06 RX ORDER — DEXTROSE MONOHYDRATE 50 MG/ML
100 INJECTION, SOLUTION INTRAVENOUS PRN
Status: DISCONTINUED | OUTPATIENT
Start: 2020-05-06 | End: 2020-05-08 | Stop reason: HOSPADM

## 2020-05-06 RX ORDER — DIPHENHYDRAMINE HYDROCHLORIDE 50 MG/ML
25 INJECTION INTRAMUSCULAR; INTRAVENOUS EVERY 6 HOURS PRN
Status: DISCONTINUED | OUTPATIENT
Start: 2020-05-06 | End: 2020-05-08 | Stop reason: HOSPADM

## 2020-05-06 RX ORDER — METHYLPREDNISOLONE SODIUM SUCCINATE 125 MG/2ML
125 INJECTION, POWDER, LYOPHILIZED, FOR SOLUTION INTRAMUSCULAR; INTRAVENOUS ONCE
Status: COMPLETED | OUTPATIENT
Start: 2020-05-06 | End: 2020-05-06

## 2020-05-06 RX ORDER — SODIUM CHLORIDE 9 MG/ML
INJECTION, SOLUTION INTRAVENOUS CONTINUOUS
Status: DISCONTINUED | OUTPATIENT
Start: 2020-05-06 | End: 2020-05-08 | Stop reason: HOSPADM

## 2020-05-06 RX ADMIN — SODIUM CHLORIDE: 9 INJECTION, SOLUTION INTRAVENOUS at 23:32

## 2020-05-06 RX ADMIN — VANCOMYCIN HYDROCHLORIDE 1000 MG: 1 INJECTION, POWDER, LYOPHILIZED, FOR SOLUTION INTRAVENOUS at 18:06

## 2020-05-06 RX ADMIN — PIPERACILLIN AND TAZOBACTAM 3.38 G: 3; .375 INJECTION, POWDER, LYOPHILIZED, FOR SOLUTION INTRAVENOUS at 16:45

## 2020-05-06 RX ADMIN — DIPHENHYDRAMINE HYDROCHLORIDE 50 MG: 50 INJECTION, SOLUTION INTRAMUSCULAR; INTRAVENOUS at 16:45

## 2020-05-06 RX ADMIN — LEVOFLOXACIN 500 MG: 5 INJECTION, SOLUTION INTRAVENOUS at 23:32

## 2020-05-06 RX ADMIN — METHYLPREDNISOLONE SODIUM SUCCINATE 125 MG: 125 INJECTION, POWDER, FOR SOLUTION INTRAMUSCULAR; INTRAVENOUS at 16:24

## 2020-05-06 RX ADMIN — IOPAMIDOL 100 ML: 612 INJECTION, SOLUTION INTRAVENOUS at 16:38

## 2020-05-06 RX ADMIN — INSULIN LISPRO 2 UNITS: 100 INJECTION, SOLUTION INTRAVENOUS; SUBCUTANEOUS at 23:32

## 2020-05-06 ASSESSMENT — ENCOUNTER SYMPTOMS
CHEST TIGHTNESS: 1
COUGH: 1
VOMITING: 0
WHEEZING: 1
ABDOMINAL DISTENTION: 0
EYE PAIN: 0
TROUBLE SWALLOWING: 0
RHINORRHEA: 0
NAUSEA: 0
EYE DISCHARGE: 0
SORE THROAT: 0
DIARRHEA: 0
BLOOD IN STOOL: 0
CONSTIPATION: 0
EYE REDNESS: 0
ABDOMINAL PAIN: 0
BACK PAIN: 0
SHORTNESS OF BREATH: 1
ANAL BLEEDING: 0
COLOR CHANGE: 0

## 2020-05-06 NOTE — H&P
Josafat Tiwari is an 45 y.o.  female. 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 Lillian Ross as outpatient presented with worsening shortness of breath cough with productive yellowish phlegm production. Patient also complains of fever and chills especially at night. Patient also complains of lower extremity swelling and redness. Patient denies any recent travel or sick contacts. CT chest does not show definite infiltrate but shows multiple masses with small pleural effusion. Patient symptoms has been started for the past few days and getting worse. In the ER patient noted to be hypoxic oxygen saturation 80s responded to oxygen therapy. pt does not use O2 at home.     Past Medical History:   Diagnosis Date    Brain cancer [facility])     Endometrial cancer ([facility] 2015    chemo and radiation    Lung cancer ([facility]     Prolonged emergence from general anesthesia 2015    Type 2 diabetes mellitus without complication ([facility]      Past Surgical History:   Procedure Laterality Date    BRONCHOSCOPY N/A 5/30/2019    BRONCHOSCOPY performed by Paty Trivedi MD at [address]  2019    HYSTERECTOMY  10/2015    LUNG BIOPSY Right 06/26/2019    Dr Noy Wright  performed     Family History   Problem Relation Age of Onset    Cancer Mother         lymphoma    Diabetes Mother     No Known Problems Father     Diabetes Maternal Grandmother         questionable female cancer    Heart Disease Maternal Grandmother     Cancer Paternal Grandfather         stomach     Social History     Tobacco History     Smoking Status  Former Smoker Quit date  1/1/2015 Smoking Frequency  1.5 packs/day for 18 years (27 pk yrs) Smoking Tobacco Type  Cigarettes    Smokeless Tobacco Use  Never Used          Alcohol History     Alcohol Use Status  Yes Comment  socially          Drug Use     Drug Use Status  Never

## 2020-05-06 NOTE — ED PROVIDER NOTES
Emotionally abused: None     Physically abused: None     Forced sexual activity: None   Other Topics Concern    None   Social History Narrative    None       SCREENINGS             PHYSICAL EXAM    (up to 7 for level 4, 8 or more for level 5)     ED Triage Vitals [05/06/20 1445]   BP Temp Temp Source Pulse Resp SpO2 Height Weight   125/74 98 °F (36.7 °C) Oral 122 20 (!) 86 % 5' 10\" (1.778 m) (!) 350 lb (158.8 kg)       Physical Exam  Vitals signs and nursing note reviewed. Constitutional:       General: She is not in acute distress. Appearance: She is well-developed. She is not diaphoretic. HENT:      Head: Normocephalic and atraumatic. Nose: Nose normal.   Eyes:      Conjunctiva/sclera: Conjunctivae normal.      Pupils: Pupils are equal, round, and reactive to light. Neck:      Musculoskeletal: Normal range of motion and neck supple. Cardiovascular:      Rate and Rhythm: Regular rhythm. Tachycardia present. Heart sounds: Normal heart sounds. Pulmonary:      Effort: Respiratory distress present. Breath sounds: Wheezing present. Abdominal:      General: Bowel sounds are normal.      Palpations: Abdomen is soft. Tenderness: There is no abdominal tenderness. Skin:     General: Skin is warm and dry. Capillary Refill: Capillary refill takes less than 2 seconds. Findings: No rash. Neurological:      Mental Status: She is alert and oriented to person, place, and time. Cranial Nerves: No cranial nerve deficit. Psychiatric:         Behavior: Behavior normal.         RESULTS     EKG: All EKG's are interpreted by the Emergency Department Physician who either signs or Co-signsthis chart in the absence of a cardiologist.    Sinus tachycardia, rate of 105, no ST elevations noted.      RADIOLOGY:   Non-plain filmimages such as CT, Ultrasound and MRI are read by the radiologist. Plain radiographic images are visualized and preliminarily interpreted by the emergency physician [facility] tel. [phone],  Troponin results called to and read back by Barrera Bowen, 05/06/2020 16:07,  by FROYLAN   CULTURE, BLOOD 1   CULTURE, BLOOD 2   LACTIC ACID, PLASMA   BRAIN NATRIURETIC PEPTIDE    Narrative:     Lisa Mayorga  [facility] tel. [phone],  Troponin results called to and read back by Barrera Andrés, 05/06/2020 16:07,  by [address]   COVID-19       All other labs were within normal range or not returned as of this dictation. EMERGENCY DEPARTMENT COURSE and DIFFERENTIAL DIAGNOSIS/MDM:   Vitals:    Vitals:    05/06/20 1530 05/06/20 1630 05/06/20 1651 05/06/20 1807   BP: (!) 108/54 119/60 132/62 (!) 126/59   Pulse: 104 105 100 105   Resp: 20 20 20 18   Temp:       TempSrc:       SpO2: 95% 95% 97% 97%   Weight:       Height:                MDM   Patient is a 64-year-old female presenting emergency department chief complaint shortness of breath. She is hemodynamically nontoxic-appearing. Upon arrival to the emergency department she was speaking in full sentences but her oxygen is 81% on room air. She did have the PCR COVID testing earlier in the past is not back yet. The patient is also tachycardic and with her history of lung cancer and pulmonary embolism, she is highly suspicious for COVID-19, possible pulmonary embolism, and possible viral versus bacterial pneumonia. Patient has pneumonia evident on xray. Given zosyn and vancomycin. Admitted to service of hospitalist in a stable condition and discussed plan of care. CRITICAL CARE TIME       CONSULTS:  IP CONSULT TO INFECTIOUS DISEASES  IP CONSULT TO ONCOLOGY    PROCEDURES:  Unless otherwise noted below, none     Procedures    FINAL IMPRESSION      1. Pneumonia due to organism    2. Hypoxia    3. Primary malignant neoplasm of lung metastatic to other site, unspecified laterality [facility])          DISPOSITION/PLAN   DISPOSITION Admitted 05/06/2020 05:38:19 PM      PATIENT REFERRED TO:  No follow-up provider specified.     DISCHARGE MEDICATIONS:  New Prescriptions    No medications on file          (Please notethat portions of this note were completed with a voice recognition program.  Efforts were made to edit the dictations but occasionally words are mis-transcribed.)    THONG Johnson CNP (electronically signed)  Attending Emergency Physician         THONG Pettit CNP  05/06/20 4663

## 2020-05-07 ENCOUNTER — APPOINTMENT (OUTPATIENT)
Dept: ULTRASOUND IMAGING | Age: 40
DRG: 180 | End: 2020-05-07
Payer: COMMERCIAL

## 2020-05-07 VITALS
HEIGHT: 70 IN | WEIGHT: 293 LBS | RESPIRATION RATE: 18 BRPM | DIASTOLIC BLOOD PRESSURE: 64 MMHG | TEMPERATURE: 97.5 F | BODY MASS INDEX: 41.95 KG/M2 | HEART RATE: 105 BPM | OXYGEN SATURATION: 99 % | SYSTOLIC BLOOD PRESSURE: 115 MMHG

## 2020-05-07 LAB
ANION GAP SERPL CALCULATED.3IONS-SCNC: 15 MEQ/L (ref 9–15)
BASOPHILS ABSOLUTE: 0.1 K/UL (ref 0–0.2)
BASOPHILS RELATIVE PERCENT: 1 %
BUN BLDV-MCNC: 8 MG/DL (ref 6–20)
CALCIUM SERPL-MCNC: 8.7 MG/DL (ref 8.5–9.9)
CHLORIDE BLD-SCNC: 95 MEQ/L (ref 95–107)
CO2: 24 MEQ/L (ref 20–31)
CREAT SERPL-MCNC: 0.7 MG/DL (ref 0.5–0.9)
EOSINOPHILS ABSOLUTE: 0 K/UL (ref 0–0.7)
EOSINOPHILS RELATIVE PERCENT: 0 %
GFR AFRICAN AMERICAN: >60
GFR NON-AFRICAN AMERICAN: >60
GLUCOSE BLD-MCNC: 220 MG/DL (ref 60–115)
GLUCOSE BLD-MCNC: 279 MG/DL (ref 60–115)
GLUCOSE BLD-MCNC: 282 MG/DL (ref 60–115)
GLUCOSE BLD-MCNC: 302 MG/DL (ref 60–115)
GLUCOSE BLD-MCNC: 358 MG/DL (ref 70–99)
HCT VFR BLD CALC: 35.9 % (ref 37–47)
HEMOGLOBIN: 11.4 G/DL (ref 12–16)
LYMPHOCYTES ABSOLUTE: 0.6 K/UL (ref 1–4.8)
LYMPHOCYTES RELATIVE PERCENT: 4.2 %
MCH RBC QN AUTO: 27.4 PG (ref 27–31.3)
MCHC RBC AUTO-ENTMCNC: 31.9 % (ref 33–37)
MCV RBC AUTO: 86.2 FL (ref 82–100)
MONOCYTES ABSOLUTE: 0.4 K/UL (ref 0.2–0.8)
MONOCYTES RELATIVE PERCENT: 2.8 %
NEUTROPHILS ABSOLUTE: 12.3 K/UL (ref 1.4–6.5)
NEUTROPHILS RELATIVE PERCENT: 92 %
PDW BLD-RTO: 16.1 % (ref 11.5–14.5)
PERFORMED ON: ABNORMAL
PLATELET # BLD: 336 K/UL (ref 130–400)
POTASSIUM SERPL-SCNC: 4.5 MEQ/L (ref 3.4–4.9)
RBC # BLD: 4.17 M/UL (ref 4.2–5.4)
SARS-COV-2, NAAT: NOT DETECTED
SODIUM BLD-SCNC: 134 MEQ/L (ref 135–144)
WBC # BLD: 13.4 K/UL (ref 4.8–10.8)

## 2020-05-07 PROCEDURE — 6370000000 HC RX 637 (ALT 250 FOR IP): Performed by: INTERNAL MEDICINE

## 2020-05-07 PROCEDURE — 36415 COLL VENOUS BLD VENIPUNCTURE: CPT

## 2020-05-07 PROCEDURE — 2700000000 HC OXYGEN THERAPY PER DAY

## 2020-05-07 PROCEDURE — 6360000002 HC RX W HCPCS: Performed by: INTERNAL MEDICINE

## 2020-05-07 PROCEDURE — 99254 IP/OBS CNSLTJ NEW/EST MOD 60: CPT | Performed by: INTERNAL MEDICINE

## 2020-05-07 PROCEDURE — 1210000000 HC MED SURG R&B

## 2020-05-07 PROCEDURE — 85025 COMPLETE CBC W/AUTO DIFF WBC: CPT

## 2020-05-07 PROCEDURE — 94664 DEMO&/EVAL PT USE INHALER: CPT

## 2020-05-07 PROCEDURE — 80048 BASIC METABOLIC PNL TOTAL CA: CPT

## 2020-05-07 PROCEDURE — 99213 OFFICE O/P EST LOW 20 MIN: CPT

## 2020-05-07 PROCEDURE — 2580000003 HC RX 258: Performed by: INTERNAL MEDICINE

## 2020-05-07 PROCEDURE — 93010 ELECTROCARDIOGRAM REPORT: CPT | Performed by: INTERNAL MEDICINE

## 2020-05-07 PROCEDURE — 36592 COLLECT BLOOD FROM PICC: CPT

## 2020-05-07 PROCEDURE — U0002 COVID-19 LAB TEST NON-CDC: HCPCS

## 2020-05-07 PROCEDURE — 93970 EXTREMITY STUDY: CPT

## 2020-05-07 RX ADMIN — VANCOMYCIN HYDROCHLORIDE 1000 MG: 1 INJECTION, POWDER, LYOPHILIZED, FOR SOLUTION INTRAVENOUS at 05:51

## 2020-05-07 RX ADMIN — CITALOPRAM HYDROBROMIDE 10 MG: 10 TABLET ORAL at 21:06

## 2020-05-07 RX ADMIN — INSULIN LISPRO 45 UNITS: 100 INJECTION, SUSPENSION SUBCUTANEOUS at 09:19

## 2020-05-07 RX ADMIN — INSULIN LISPRO 1 UNITS: 100 INJECTION, SOLUTION INTRAVENOUS; SUBCUTANEOUS at 21:07

## 2020-05-07 RX ADMIN — APIXABAN 5 MG: 5 TABLET, FILM COATED ORAL at 21:06

## 2020-05-07 RX ADMIN — INSULIN LISPRO 45 UNITS: 100 INJECTION, SUSPENSION SUBCUTANEOUS at 18:00

## 2020-05-07 RX ADMIN — POTASSIUM CHLORIDE 40 MEQ: 20 TABLET, EXTENDED RELEASE ORAL at 05:51

## 2020-05-07 RX ADMIN — SODIUM CHLORIDE: 9 INJECTION, SOLUTION INTRAVENOUS at 18:00

## 2020-05-07 RX ADMIN — APIXABAN 5 MG: 5 TABLET, FILM COATED ORAL at 09:18

## 2020-05-07 RX ADMIN — LEVOFLOXACIN 500 MG: 5 INJECTION, SOLUTION INTRAVENOUS at 21:06

## 2020-05-07 ASSESSMENT — ENCOUNTER SYMPTOMS
CHEST TIGHTNESS: 1
GASTROINTESTINAL NEGATIVE: 1
SHORTNESS OF BREATH: 1
COUGH: 1
EYES NEGATIVE: 1

## 2020-05-07 NOTE — ACP (ADVANCE CARE PLANNING)
state of health and suddenly became very ill and were unable to breathe on your own, what would your preference be about the use of a ventilator (breathing machine) if it were available to you? \"      Would the patient desire the use of ventilator (breathing machine)?: yes    \"If your health worsens and it becomes clear that your chance of recovery is unlikely, what would your preference be about the use of a ventilator (breathing machine) if it were available to you? \"     Would the patient desire the use of ventilator (breathing machine)?: No      Resuscitation  \"CPR works best to restart the heart when there is a sudden event, like a heart attack, in someone who is otherwise healthy. Unfortunately, CPR does not typically restart the heart for people who have serious health conditions or who are very sick. \"    \"In the event your heart stopped as a result of an underlying serious health condition, would you want attempts to be made to restart your heart (answer \"yes\" for attempt to resuscitate) or would you prefer a natural death (answer \"no\" for do not attempt to resuscitate)? \" yes      NOTE: If the patient has a valid advance directive AND now provides care preference(s) that are inconsistent with that prior directive, advise the patient to consider either: creating a new advance directive that complies with state-specific requirements; or, if that is not possible, orally revoking that prior directive in accordance with state-specific requirements, which must be documented in the EHR. [] Yes   [] No   Educated Patient / Evelina Lorenzana regarding differences between Advance Directives and portable DNR orders.     Length of ACP Conversation in minutes:  7 MIN    Conversation Outcomes:  [x] ACP discussion completed  [] Existing advance directive reviewed with patient; no changes to patient's previously recorded wishes  [] New Advance Directive completed  [] Portable Do Not Rescitate prepared for Provider review and

## 2020-05-07 NOTE — CONSULTS
Hematology/Oncology Consult  Encounter Date: 2020 11:31 AM    Ms. Josafat Tiwari is a 45 y.o. female  : 1980  MRN: [id number]  Acct Number: [de-identified]  Requesting Provider: DR Nataly Marie   Reason for request: Lung cancer      CONSULTANT: Suma Brilliant    HPI: Zoraida Cartwright was admitted for shortness of breathe with low O2 level. Ct scan showed pulmonary masses but no embolism. Covid 19 test was negative. Diagnosed with non small cell lung cancer in 2019 with left occipital lobe of the brain treated with gamma knife in 2020. Present chemotherapy  Opdivo after progression on carboplatin/gemzar. New brain metastasis in 3/2020 and treated with gamma knife 3/30/2020. History of pulmonary embolism and on Eliquis. She was feeling better when she was on decadron. Discontinued in 3/2020.      Patient Active Problem List   Diagnosis    Lung mass    H/O: hysterectomy    Abnormal vaginal bleeding    Cavitating mass in right middle lung lobe    Endometrial cancer (HCC)    Hemoptysis    Bacteremia    Fusobacterium infection    Cavitary pneumonia    Poor venous access    Lung cancer ([address]    Mediastinal lymphadenopathy    Hilar lymphadenopathy    History of endometrial cancer    Pulmonary embolism and infarction ([address]    Lung cancer metastatic to brain ([address]    PNA (pneumonia)     Past Medical History:   Diagnosis Date    Brain cancer ([address]     Endometrial cancer ([address]     chemo and radiation    Lung cancer ([address]     Prolonged emergence from general anesthesia     Type 2 diabetes mellitus without complication ([address]      @PS@  Family History   Problem Relation Age of Onset    Cancer Mother         lymphoma    Diabetes Mother     No Known Problems Father     Diabetes Maternal Grandmother         questionable female cancer    Heart Disease Maternal Grandmother     Cancer Paternal Grandfather         stomach     Social History     Socioeconomic History    Marital status: Single Spouse name: Not on file    Number of children: Not on file    Years of education: Not on file    Highest education level: Not on file   Occupational History    Not on file   Social Needs    Financial resource strain: Not on file    Food insecurity     Worry: Not on file     Inability: Not on file    Transportation needs     Medical: Not on file     Non-medical: Not on file   Tobacco Use    Smoking status: Former Smoker     Packs/day: 1.50     Years: 18.00     Pack years: 27.00     Types: Cigarettes     Last attempt to quit:      Years since quittin.3    Smokeless tobacco: Never Used   Substance and Sexual Activity    Alcohol use: Yes     Frequency: Never     Comment: socially    Drug use: Never    Sexual activity: Not Currently   Lifestyle    Physical activity     Days per week: Not on file     Minutes per session: Not on file    Stress: Not on file   Relationships    Social connections     Talks on phone: Not on file     Gets together: Not on file     Attends Bahai service: Not on file     Active member of club or organization: Not on file     Attends meetings of clubs or organizations: Not on file     Relationship status: Not on file    Intimate partner violence     Fear of current or ex partner: Not on file     Emotionally abused: Not on file     Physically abused: Not on file     Forced sexual activity: Not on file   Other Topics Concern    Not on file   Social History Narrative    Not on file              Current Facility-Administered Medications   Medication Dose Route Frequency Provider Last Rate Last Dose    albuterol-ipratropium (COMBIVENT RESPIMAT)  MCG/ACT inhaler 1 puff  1 puff Inhalation Q4H PRN Jack Arreguin MD        vancomycin 1000 mg IVPB in 250 mL D5W addavial  1,000 mg Intravenous Q12H Jack Arreguin MD   Stopped at 20 0651    diphenhydrAMINE (BENADRYL) injection 25 mg  25 mg Intravenous Q6H PRN Jack Arreguin MD        levoFLOXacin (LEVAQUIN) 500 adenopathy in the neck Levels I-V, bilateral   Supraclavicular fossae, axillary chains, or inguinal regions. LUNGS: Good inspiratory effort, no accessory muscle use, clear bilaterally, no focal wheeze, rales or rhonchi. CARDIAC: Regular rate and rhythm, without murmurs, rubs or gallops. ABDOMINAL: Normal bowel soundspresent, soft, non-tender, no mass or  organomegaly.   MUSKL:    LAB RESULTS:  Recent Results (from the past 24 hour(s))   Comprehensive Metabolic Panel    Collection Time: 05/06/20  3:00 PM   Result Value Ref Range    Sodium 135 135 - 144 mEq/L    Potassium 3.2 (L) 3.4 - 4.9 mEq/L    Chloride 95 95 - 107 mEq/L    CO2 27 20 - 31 mEq/L    Anion Gap 13 9 - 15 mEq/L    Glucose 197 (H) 70 - 99 mg/dL    BUN 7 6 - 20 mg/dL    CREATININE 0.66 0.50 - 0.90 mg/dL    GFR Non-African American >60.0 >60    GFR  >60.0 >60    Calcium 8.4 (L) 8.5 - 9.9 mg/dL    Total Protein 6.1 (L) 6.3 - 8.0 g/dL    Alb 2.9 (L) 3.5 - 4.6 g/dL    Total Bilirubin 0.4 0.2 - 0.7 mg/dL    Alkaline Phosphatase 72 40 - 130 U/L    ALT 14 0 - 33 U/L    AST 19 0 - 35 U/L    Globulin 3.2 2.3 - 3.5 g/dL   CBC Auto Differential    Collection Time: 05/06/20  3:00 PM   Result Value Ref Range    WBC 4.7 (L) 4.8 - 10.8 K/uL    RBC 6.24 (H) 4.20 - 5.40 M/uL    Hemoglobin 17.4 (H) 12.0 - 16.0 g/dL    Hematocrit 53.0 (H) 37.0 - 47.0 %    MCV 84.8 82.0 - 100.0 fL    MCH 27.9 27.0 - 31.3 pg    MCHC 32.9 (L) 33.0 - 37.0 %    RDW 17.1 (H) 11.5 - 14.5 %    Platelets 192 720 - 268 K/uL    Neutrophils % 84.9 %    Lymphocytes % 7.4 %    Monocytes % 5.1 %    Eosinophils % 2.3 %    Basophils % 0.3 %    Neutrophils Absolute 4.0 1.4 - 6.5 K/uL    Lymphocytes Absolute 0.4 (L) 1.0 - 4.8 K/uL    Monocytes Absolute 0.2 0.2 - 0.8 K/uL    Eosinophils Absolute 0.1 0.0 - 0.7 K/uL    Basophils Absolute 0.0 0.0 - 0.2 K/uL   Lactic Acid, Plasma    Collection Time: 05/06/20  3:00 PM   Result Value Ref Range    Lactic Acid 2.0 0.5 - 2.2 mmol/L   Magnesium

## 2020-05-07 NOTE — PROGRESS NOTES
[facility] Respiratory Therapy Evaluation   Current Order:  DUONEB Q4 PRN      Home Regimen: PRN      Ordering Physician: Lulu Beth  Re-evaluation Date:  N/A     Diagnosis: PNEUMONIA      Patient Status: Stable / Unstable + Physician notified    The following MDI Criteria must be met in order to convert aerosol to MDI with spacer. If unable to meet, MDI will be converted to aerosol:  []  Patient able to demonstrate the ability to use MDI effectively  []  Patient alert and cooperative  []  Patient able to take deep breath with 5-10 second hold  []  Medication(s) available in this delivery method   []  Peak flow greater than or equal to 200 ml/min            Current Order Substituted To  (same drug, same frequency)   Aerosol to MDI [] Albuterol Sulfate 0.083% unit dose by aerosol Albuterol Sulfate MDI 2 puffs by inhalation with spacer    [] Levalbuterol 1.25 mg unit dose by aerosol Levalbuterol MDI 2 puffs by inhalation with spacer    [] Levalbuterol 0.63 mg unit dose by aerosol Levalbuterol MDI 2 puffs by inhalation with spacer    [] Ipratropium Bromide 0.02% unit dose by aerosol Ipratropium Bromide MDI 2 puffs by inhalation with spacer    [x] Duoneb (Ipratropium + Albuterol) unit dose by aerosol Ipratropium MDI + Albuterol MDI 2 puffs by inhalation w/spacer   MDI to Aerosol [] Albuterol Sulfate MDI Albuterol Sulfate 0.083% unit dose by aerosol    [] Levalbuterol MDI 2 puffs by inhalation Levalbuterol 1.25 mg unit dose by aerosol    [] Ipratropium Bromide MDI by inhalation Ipratropium Bromide 0.02% unit dose by aerosol    [] Combivent (Ipratropium + Albuterol) MDI by inhalation Duoneb (Ipratropium + Albuterol) unit dose by aerosol   Treatment Assessment [Frequency/Schedule]:  Change frequency to: ________COMBIVENT 1 PUFF Q4 PRN__________________________________________per Protocol, P&T, MEC      Points 0 1 2 3 4   Pulmonary Status  Non-Smoker  []   Smoking history   < 20 pack years  []   Smoking history  ?  20 pack

## 2020-05-07 NOTE — CONSULTS
Physical Exam   Constitutional: She appears distressed. HENT:   Head: Normocephalic. Eyes: Pupils are equal, round, and reactive to light. Neck: Normal range of motion. Neck supple. No JVD present. No tracheal deviation present. No thyromegaly present. Cardiovascular:   No murmur heard. Pulmonary/Chest: No respiratory distress. She has no wheezes. She has no rales. She exhibits no tenderness. Diffuse rhonchi   Abdominal: Soft. Bowel sounds are normal. She exhibits no distension and no mass. There is no hepatosplenomegaly. There is no abdominal tenderness. There is no rebound, no guarding and no CVA tenderness. Musculoskeletal:         General: No tenderness or edema. Lymphadenopathy:     She has no cervical adenopathy. She has no axillary adenopathy. Right: No inguinal, no supraclavicular and no epitrochlear adenopathy present. Left: No inguinal, no supraclavicular and no epitrochlear adenopathy present. Neurological: She is alert. Skin: Skin is warm. No rash noted. She is not diaphoretic. No erythema. No pallor. Blood pressure 122/74, pulse 97, temperature 97.3 °F (36.3 °C), temperature source Oral, resp. rate 16, height 5' 10\" (1.778 m), weight (!) 356 lb 1.6 oz (161.5 kg), SpO2 93 %.       .   Lab Results   Component Value Date    WBC 13.4 (H) 05/07/2020    HGB 11.4 (L) 05/07/2020    HCT 35.9 (L) 05/07/2020    MCV 86.2 05/07/2020     05/07/2020     Lab Results   Component Value Date     05/07/2020    K 4.5 05/07/2020    K 4.3 08/01/2019    CL 95 05/07/2020    CO2 24 05/07/2020    BUN 8 05/07/2020    CREATININE 0.70 05/07/2020    GLUCOSE 358 05/07/2020    CALCIUM 8.7 05/07/2020                ASSESSMENT:  Patient Active Problem List   Diagnosis    Lung mass    H/O: hysterectomy    Abnormal vaginal bleeding    Cavitating mass in right middle lung lobe    Endometrial cancer (HCC)    Hemoptysis    Bacteremia    Fusobacterium infection    Cavitary

## 2020-05-07 NOTE — PLAN OF CARE
Problem: Falls - Risk of:  Goal: Will remain free from falls  Description: Will remain free from falls  Outcome: Ongoing  Goal: Absence of physical injury  Description: Absence of physical injury  Outcome: Ongoing     Problem:  Activity:  Goal: Ability to tolerate increased activity will improve  Description: Ability to tolerate increased activity will improve  Outcome: Ongoing  Goal: Ability to implement measures to reduce episodes of fatigue will improve  Description: Ability to implement measures to reduce episodes of fatigue will improve  Outcome: Ongoing     Problem: Coping:  Goal: Ability to cope will improve  Description: Ability to cope will improve  Outcome: Ongoing     Problem: Health Behavior:  Goal: Adoption of positive health habits will improve  Description: Adoption of positive health habits will improve  Outcome: Ongoing  Goal: Identification of resources available to assist in meeting health care needs will improve  Description: Identification of resources available to assist in meeting health care needs will improve  Outcome: Ongoing  Goal: Ability to verbalize follow-up procedures will improve  Description: Ability to verbalize follow-up procedures will improve  Outcome: Ongoing     Problem: Nutritional:  Goal: Nutritional status will improve  Description: Nutritional status will improve  Outcome: Ongoing     Problem: Physical Regulation:  Goal: Complications related to the disease process, condition or treatment will be avoided or minimized  Description: Complications related to the disease process, condition or treatment will be avoided or minimized  Outcome: Ongoing     Problem: Safety:  Goal: Ability to remain free from injury will improve  Description: Ability to remain free from injury will improve  Outcome: Ongoing     Problem: Sensory:  Goal: Ability to develop a pain control plan will improve  Description: Ability to develop a pain control plan will improve  Outcome: Ongoing     Problem: Gas Exchange - Impaired:  Goal: Levels of oxygenation will improve  Description: Levels of oxygenation will improve  Outcome: Ongoing     Problem: Wound:  Goal: Will show signs of wound healing; wound closure and no evidence of infection  Description: Will show signs of wound healing; wound closure and no evidence of infection  Outcome: Ongoing

## 2020-05-07 NOTE — CARE COORDINATION
[facility] EMERGENCY [facility] CENTER AT [place] Case Management Initial Discharge Assessment    Met with Patient to discuss discharge plan. PCP: No primary care provider on file. Per patient, she does not want a PCP as she has at least four other doctors involved in her care that she sees regularly. Date of Last Visit: Was seen by pulmonologist in March 2020    If no PCP, list provided? N/A    Discharge Planning     Living Arrangements: independently at home apartment    Who do you live with? alone    Who helps you with your care:  self    If lives at home:     Do you have any barriers navigating in your home? yes - flight of stairs to get into apartment    Patient can perform ADL? No    Current Services (outpatient and in home) :  None    Dialysis: No    Is transportation available to get to your appointments? Yes    DME Equipment:  no    Respiratory equipment: None    Respiratory provider:  no     Pharmacy:  yes - [address]  with Medication Assistance Program?  No      Patient agreeable to [place] [id number]? No    Patient agreeable to SNF/Rehab? No    Other discharge needs identified? N/A    Freedom of choice list provided with basic dialogue that supports the patient's individualized plan of care/goals and shares the quality data associated with the providers. Yes    Does Patient Have a High-Risk for Readmission Diagnosis (CHF, PN, MI, COPD)? Yes, see care coordinator assessment. Risk of readmission is 17%    The plan for Transition of Care is related to the following treatment goals: Await medical plan as well as results from lab. Initial Discharge Plan? (Note: please see concurrent daily documentation for any updates after initial note). Spoke with patient who reports she was independent at her apt and still driving. Her parents are supportive. She does go to the [facility]. Patient indicated she would like to learn if she needs oxygen at home.  While she has stairs

## 2020-05-07 NOTE — PROGRESS NOTES
Hospitalist Daily Progress Note  Name: Kiya Segura  Age: 45 y.o. Gender: female  CodeStatus: Full Code  Allergies: Pcn [Penicillins]  Sulfa Antibiotics  Sulfites    Chief Complaint:Shortness of Breath      Primary Care Provider: No primary care provider on file. InpatientTreatment Team: Treatment Team: Attending Provider: Beto Holt MD; Consulting Physician: Douglas Aggarwal MD; Consulting Physician: Nan Nielsen MD; : Felix Koenig RN; Registered Nurse: Dinorah Hutchinson RN; Wound Care: Al Andrews RN; Patient Care Tech: Alyx Cunninghammsted    Admission Date: 5/6/2020      Subjective: No chest pain, nausea. SOB improved but on O2 and not at baseline. Physical Exam  Vitals signs and nursing note reviewed. Constitutional:       Appearance: Normal appearance. Cardiovascular:      Rate and Rhythm: Normal rate and regular rhythm. Pulmonary:      Effort: Pulmonary effort is normal.      Breath sounds: Rhonchi present. Abdominal:      General: Bowel sounds are normal.      Palpations: Abdomen is soft. Musculoskeletal: Normal range of motion. Skin:     General: Skin is warm and dry. Neurological:      Mental Status: She is alert and oriented to person, place, and time. Mental status is at baseline.          Medications:  Reviewed    Infusion Medications:    sodium chloride 75 mL/hr at 05/06/20 2332    dextrose       Scheduled Medications:    levofloxacin  500 mg Intravenous Q24H    insulin lispro  0-6 Units Subcutaneous TID WC    insulin lispro  0-3 Units Subcutaneous Nightly    apixaban  5 mg Oral BID    citalopram  10 mg Oral Nightly    insulin lispro protamine & lispro  45 Units Subcutaneous BID WC     PRN Meds: albuterol-ipratropium, diphenhydrAMINE, glucose, dextrose, glucagon (rDNA), dextrose    Labs:   Recent Labs     05/06/20  1500 05/07/20  0600   WBC 4.7* 13.4*   HGB 17.4* 11.4*   HCT 53.0* 35.9*    336     Recent Labs     05/06/20  1500 05/07/20  0600    134*   K 3.2* 4.5   CL 95 95   CO2 27 24   BUN 7 8   CREATININE 0.66 0.70   CALCIUM 8.4* 8.7     Recent Labs     05/06/20  1500   AST 19   ALT 14   BILITOT 0.4   ALKPHOS 72     No results for input(s): INR in the last 72 hours. Recent Labs     05/06/20  1500   TROPONINI 0.012*       Urinalysis:   Lab Results   Component Value Date    NITRU POSITIVE 07/31/2019    WBCUA 3-5 07/31/2019    BACTERIA MANY 07/31/2019    RBCUA 0-2 07/31/2019    BLOODU Negative 07/31/2019    SPECGRAV 1.051 07/31/2019    GLUCOSEU 250 07/31/2019       Radiology:   Most recent    Chest CT      WITH CONTRAST:  Results for orders placed during the hospital encounter of 10/30/19   CT CHEST W CONTRAST    Narrative CT CHEST W CONTRAST : 10/30/2019    CLINICAL HISTORY:C34.90 Malignant neoplasm of lung, unspecified laterality, unspecified part of lung ([facility] [address] ICD10. COMPARISON: Chest CTA 7/31/2019. TECHNIQUE: Spiral unenhanced imaging was obtained of the chest after the uneventful intravenous administration of approximately 75 mL of Isovue-370 contrast.     All CT scans at this facility use dose modulation, iterative reconstruction, and/or weight based dosing when appropriate to reduce radiation dose to as low as reasonably achievable. FINDINGS:     The largest confluent mass within the right upper lobe appears to have slightly decreased in size and has more central aeration, consistent with a positive response to treatment. Other large or areas have not significantly decreased in size, but the largest have also had increased central aeration. Numerous other smaller nodules cannot significantly changed in size and number. Moderate mediastinal and hilar lymphadenopathy appears unchanged. The study was not performed for evaluation of pulmonary emboli, but filling defects within the left lower lobe pulmonary arterial branches noted on the previous study are not identified.     There is no significant pleural or pulmonary involvement. Echo No results found for this or any previous visit.           Assessment/Plan:    Active Hospital Problems    Diagnosis Date Noted    PNA (pneumonia) [J18.9] 05/06/2020     1 -- Pneumonia with acute hypoxic resp failure   5/7 - cont levaquin, wean O2    2 - Hx lung ca   5/7 - restart systemic steroids per oncology, continue at discharge; u/s pending of lower extremities    Poss discharge tomorrow if off O2 and u/s negative      Electronically signed by Cookie Segura MD on 5/7/2020 at 4:45 PM

## 2020-05-08 LAB
GLUCOSE BLD-MCNC: 181 MG/DL (ref 60–115)
GLUCOSE BLD-MCNC: 201 MG/DL (ref 60–115)
PERFORMED ON: ABNORMAL
PERFORMED ON: ABNORMAL
SARS-COV-2: NOT DETECTED
SOURCE: NORMAL

## 2020-05-08 PROCEDURE — 6360000002 HC RX W HCPCS: Performed by: INTERNAL MEDICINE

## 2020-05-08 PROCEDURE — 2700000000 HC OXYGEN THERAPY PER DAY

## 2020-05-08 PROCEDURE — 99232 SBSQ HOSP IP/OBS MODERATE 35: CPT | Performed by: INTERNAL MEDICINE

## 2020-05-08 PROCEDURE — 6370000000 HC RX 637 (ALT 250 FOR IP): Performed by: INTERNAL MEDICINE

## 2020-05-08 PROCEDURE — 2580000003 HC RX 258: Performed by: INTERNAL MEDICINE

## 2020-05-08 PROCEDURE — 94761 N-INVAS EAR/PLS OXIMETRY MLT: CPT

## 2020-05-08 RX ORDER — DEXAMETHASONE 4 MG/1
4 TABLET ORAL 2 TIMES DAILY WITH MEALS
Qty: 60 TABLET | Refills: 2 | Status: SHIPPED | OUTPATIENT
Start: 2020-05-08

## 2020-05-08 RX ORDER — METHYLPREDNISOLONE SODIUM SUCCINATE 40 MG/ML
40 INJECTION, POWDER, LYOPHILIZED, FOR SOLUTION INTRAMUSCULAR; INTRAVENOUS EVERY 8 HOURS
Status: DISCONTINUED | OUTPATIENT
Start: 2020-05-08 | End: 2020-05-08 | Stop reason: HOSPADM

## 2020-05-08 RX ORDER — LEVOFLOXACIN 500 MG/1
500 TABLET, FILM COATED ORAL DAILY
Qty: 10 TABLET | Refills: 0 | Status: SHIPPED | OUTPATIENT
Start: 2020-05-08 | End: 2020-05-18

## 2020-05-08 RX ADMIN — METHYLPREDNISOLONE SODIUM SUCCINATE 40 MG: 40 INJECTION, POWDER, FOR SOLUTION INTRAMUSCULAR; INTRAVENOUS at 12:36

## 2020-05-08 RX ADMIN — APIXABAN 5 MG: 5 TABLET, FILM COATED ORAL at 08:27

## 2020-05-08 RX ADMIN — SODIUM CHLORIDE: 9 INJECTION, SOLUTION INTRAVENOUS at 05:54

## 2020-05-08 RX ADMIN — INSULIN LISPRO 45 UNITS: 100 INJECTION, SUSPENSION SUBCUTANEOUS at 08:30

## 2020-05-08 ASSESSMENT — ENCOUNTER SYMPTOMS
SHORTNESS OF BREATH: 1
COUGH: 1
CHEST TIGHTNESS: 1
GASTROINTESTINAL NEGATIVE: 1

## 2020-05-08 NOTE — CARE COORDINATION
Social work note: TORSTEN spoke with patient regarding [address] [facility] [place], which could potentially be this date. She declines this service. She did note she has a ride home when she is DC.  Electronically signed by TORSTEN Mobley on 5/8/2020 at 11:05 AM

## 2020-05-08 NOTE — PROGRESS NOTES
Hematology/Oncology   Progress Note        CHIEF COMPLAINT/HPI:  Follow up of lung cancer. Still has shortness of breathe. Started on solumedrol. She will go home on decadron. REVIEW OF SYSTEMS:    Unremarkable except for symptoms mentioned in HPI.     Current Inpatient Medications:    Current Facility-Administered Medications   Medication Dose Route Frequency Provider Last Rate Last Dose    methylPREDNISolone sodium (SOLU-MEDROL) injection 40 mg  40 mg Intravenous Q8H Marisela Montero MD        albuterol-ipratropium (COMBIVENT RESPIMAT)  MCG/ACT inhaler 1 puff  1 puff Inhalation Q4H PRN Mireya Davis MD        diphenhydrAMINE (BENADRYL) injection 25 mg  25 mg Intravenous Q6H PRN Mireya Davis MD        levoFLOXacin (LEVAQUIN) 500 MG/100ML infusion 500 mg  500 mg Intravenous Q24H Mireya Davis MD   Stopped at 05/07/20 2206    0.9 % sodium chloride infusion   Intravenous Continuous Mireya Davis MD 75 mL/hr at 05/08/20 0554      insulin lispro (HUMALOG) injection vial 0-6 Units  0-6 Units Subcutaneous TID  Mireya Davis MD   2 Units at 05/08/20 0830    insulin lispro (HUMALOG) injection vial 0-3 Units  0-3 Units Subcutaneous Nightly Mireya Davis MD   1 Units at 05/07/20 2107    glucose (GLUTOSE) 40 % oral gel 15 g  15 g Oral PRN Mireya Davis MD        dextrose 50 % IV solution  12.5 g Intravenous PRN Mireya Davis MD        glucagon (rDNA) injection 1 mg  1 mg Intramuscular PRN Mireya Davis MD        dextrose 5 % solution  100 mL/hr Intravenous PRN Mireya Davis MD        apixaban (ELIQUIS) tablet 5 mg  5 mg Oral BID Mireya Davis MD   5 mg at 05/08/20 0827    citalopram (CELEXA) tablet 10 mg  10 mg Oral Nightly Mireya Davis MD   10 mg at 05/07/20 2106    insulin lispro protamine & lispro (HUMALOG MIX) (75-25) 100 UNIT per ML injection vial SUSP 45 Units  45 Units Subcutaneous BID  Mireya Davis MD   45 Units at 05/08/20 0830       PHYSICAL EXAM:    EYES:  Lids and lashes normal, pupils Reviewed      Pathology: Reviewed where indicated      ASSESSMENT:  Active Problems:    PNA (pneumonia)  Resolved Problems:    * No resolved hospital problems. *    Patient Active Problem List   Diagnosis    Lung mass    H/O: hysterectomy    Abnormal vaginal bleeding    Cavitating mass in right middle lung lobe    Endometrial cancer (HCC)    Hemoptysis    Bacteremia    Fusobacterium infection    Cavitary pneumonia    Poor venous access    Lung cancer ([facility] [address]    Mediastinal lymphadenopathy    Hilar lymphadenopathy    History of endometrial cancer    Pulmonary embolism and infarction ([facility] [address]    Lung cancer metastatic to brain ([facility] [address]    PNA (pneumonia)       PLAN:  Started on solumedrol.            Electronically signed by Mauro Juarez MD on 5/8/20 at 10:20 AM EDT

## 2020-05-08 NOTE — DISCHARGE SUMMARY
Physician Discharge Summary     Patient ID:  Jennifer Maloney  [id number]  42 y.o.  1980    Admit date: 5/6/2020    Discharge date : 05/08/20     Admitting Physician: No admitting provider for patient encounter. Discharge Physician: Argenis Babb MD     Admission Diagnoses: PNA (pneumonia) [J18.9]    Discharge Diagnoses: Acute respiratory failure with hypoxia, multifocal pneumonia, type unable to determine; worsening of lung masses    Admission Condition: fair    Discharged Condition: fair    Hospital Course:   1 -- Pneumonia with acute hypoxic resp failure              5/7 - cont levaquin, wean O2   5/8 - unable to fully wean, patient qualifies for home O2 due to lung ca worsened.       2 - Hx lung ca              5/7 - restart systemic steroids per oncology, continue at discharge; u/s pending of lower extremities    Consults: ID and hematology/oncology    Significant Diagnostic Studies: as below    Discharge Exam:  /64   Pulse 105   Temp 97.5 °F (36.4 °C) (Oral)   Resp 18   Ht 5' 10\" (1.778 m)   Wt (!) 356 lb 1.6 oz (161.5 kg)   SpO2 99%   BMI 51.10 kg/m²   General appearance: alert, appears stated age and cooperative  Lungs: diminished breath sounds bilaterally  Heart: regular rate and rhythm, S1, S2 normal, no murmur, click, rub or gallop  Abdomen: soft, non-tender; bowel sounds normal; no masses,  no organomegaly  Extremities: extremities normal, atraumatic, no cyanosis or edema  Skin: Skin color, texture, turgor normal. No rashes or lesions    Labs:   Recent Labs     05/06/20  1500 05/07/20  0600   WBC 4.7* 13.4*   HGB 17.4* 11.4*   HCT 53.0* 35.9*    336     Recent Labs     05/06/20  1500 05/07/20  0600    134*   K 3.2* 4.5   CL 95 95   CO2 27 24   BUN 7 8   CREATININE 0.66 0.70   CALCIUM 8.4* 8.7     Recent Labs     05/06/20  1500   AST 19   ALT 14   BILITOT 0.4   ALKPHOS 72     No results for input(s): INR in the last 72 hours.   Recent Labs     05/06/20  1500 Culture, Blood 2 Preliminary     5/6/2020 1526 Culture, Blood 1 Preliminary           Patient Instructions:   Current Discharge Medication List      START taking these medications    Details   levoFLOXacin (LEVAQUIN) 500 MG tablet Take 1 tablet by mouth daily for 10 days  Qty: 10 tablet, Refills: 0         CONTINUE these medications which have CHANGED    Details   dexamethasone (DECADRON) 4 MG tablet Take 1 tablet by mouth 2 times daily (with meals)  Qty: 60 tablet, Refills: 2         CONTINUE these medications which have NOT CHANGED    Details   insulin aspart protamine-insulin aspart (NOVOLOG MIX 70/30 FLEXPEN) (70-30) 100 UNIT/ML injection Inject 45-55 units BID  Qty: 15 pen, Refills: 3      metFORMIN (GLUCOPHAGE) 500 MG tablet take 1 tablet by mouth twice a day with meals  Qty: 60 tablet, Refills: 3      budesonide-formoterol (SYMBICORT) 160-4.5 MCG/ACT AERO Inhale 2 puffs into the lungs 2 times daily  Qty: 1 Inhaler, Refills: 5      Insulin Pen Needle (NOVOFINE) 32G X 6 MM MISC Bid  Qty: 300 each, Refills: 3      blood glucose test strips (ONETOUCH VERIO) strip 1 each by In Vitro route 2 times daily As needed. Qty: 100 each, Refills: 3      Blood Glucose Monitoring Suppl (Lian Gomes) w/Device KIT As directed  Qty: 1 kit, Refills: 0      Lancets (ONETOUCH DELICA PLUS [id number]) MISC bid  Qty: 100 each, Refills: 06      apixaban (ELIQUIS STARTER PACK) 5 MG TABS tablet Take 10 mg (2 tablets) orally twice daily for 7 days, then take 5 mg (1 tablet) orally twice daily thereafter.   Qty: 74 tablet, Refills: 0      citalopram (CELEXA) 10 MG tablet Take 10 mg by mouth nightly       VARUBI 90 MG TABS          STOP taking these medications       insulin lispro protamine & lispro (HUMALOG MIX 75/25 KWIKPEN) (75-25) 100 UNIT per ML SUPN injection pen Comments:   Reason for Stopping:         LORazepam (ATIVAN) 0.5 MG tablet Comments:   Reason for Stopping:         pantoprazole (PROTONIX) 40 MG tablet Comments:   Reason

## 2020-05-08 NOTE — PROGRESS NOTES
CO2 24 05/07/2020    BUN 8 05/07/2020    CREATININE 0.70 05/07/2020    GLUCOSE 358 05/07/2020    CALCIUM 8.7 05/07/2020                ASSESSMENT:  Patient Active Problem List   Diagnosis    Lung mass    H/O: hysterectomy    Abnormal vaginal bleeding    Cavitating mass in right middle lung lobe    Endometrial cancer (HCC)    Hemoptysis    Bacteremia    Fusobacterium infection    Cavitary pneumonia    Poor venous access    Lung cancer ([address]    Mediastinal lymphadenopathy    Hilar lymphadenopathy    History of endometrial cancer    Pulmonary embolism and infarction ([address]    Lung cancer metastatic to brain ([address]    PNA (pneumonia)         PLAN:          Possible pneumonia given patient's symptoms chest x-ray CT scan difficult to interpret because of masses    Suspected COVID-19  Second COVID 19 PCR negative okay to remove from isolation       Levaquin

## 2020-05-09 ENCOUNTER — CARE COORDINATION (OUTPATIENT)
Dept: CASE MANAGEMENT | Age: 40
End: 2020-05-09

## 2020-05-11 LAB
BLOOD CULTURE, ROUTINE: NORMAL
CULTURE, BLOOD 2: NORMAL

## 2020-05-11 NOTE — CARE COORDINATION
5/11/2020: Pneumonia booklet and zones sheet mailed to patient's home. Care Transition Nurse will phone patient to provide education and follow up.

## 2020-05-21 ENCOUNTER — CARE COORDINATION (OUTPATIENT)
Dept: CASE MANAGEMENT | Age: 40
End: 2020-05-21

## 2020-06-23 ENCOUNTER — OFFICE VISIT (OUTPATIENT)
Dept: SURGERY | Age: 40
End: 2020-06-23
Payer: COMMERCIAL

## 2020-06-23 VITALS
HEART RATE: 112 BPM | SYSTOLIC BLOOD PRESSURE: 124 MMHG | BODY MASS INDEX: 51.1 KG/M2 | HEIGHT: 70 IN | OXYGEN SATURATION: 94 % | DIASTOLIC BLOOD PRESSURE: 68 MMHG

## 2020-06-23 PROCEDURE — 99203 OFFICE O/P NEW LOW 30 MIN: CPT | Performed by: COLON & RECTAL SURGERY

## 2020-06-23 ASSESSMENT — ENCOUNTER SYMPTOMS
ABDOMINAL PAIN: 0
ABDOMINAL DISTENTION: 0
SHORTNESS OF BREATH: 1
WHEEZING: 0
COLOR CHANGE: 0

## 2020-06-25 ENCOUNTER — NURSE ONLY (OUTPATIENT)
Dept: PRIMARY CARE CLINIC | Age: 40
End: 2020-06-25

## 2020-06-25 ENCOUNTER — HOSPITAL ENCOUNTER (OUTPATIENT)
Age: 40
Setting detail: SPECIMEN
Discharge: HOME | End: 2020-06-25
Payer: COMMERCIAL

## 2020-06-25 ENCOUNTER — OFFICE VISIT (OUTPATIENT)
Dept: FAMILY MEDICINE CLINIC | Age: 40
End: 2020-06-25
Payer: COMMERCIAL

## 2020-06-25 VITALS
WEIGHT: 293 LBS | DIASTOLIC BLOOD PRESSURE: 76 MMHG | OXYGEN SATURATION: 92 % | HEART RATE: 113 BPM | TEMPERATURE: 98 F | BODY MASS INDEX: 49.5 KG/M2 | SYSTOLIC BLOOD PRESSURE: 120 MMHG

## 2020-06-25 LAB
ALBUMIN SERPL-MCNC: 3.6 G/DL (ref 3.5–4.6)
ALP BLD-CCNC: 100 U/L (ref 40–130)
ALT SERPL-CCNC: 73 U/L (ref 0–33)
ANION GAP SERPL CALCULATED.3IONS-SCNC: 16 MEQ/L (ref 9–15)
APTT: 29.6 SEC (ref 24.4–36.8)
AST SERPL-CCNC: 44 U/L (ref 0–35)
BILIRUB SERPL-MCNC: 0.3 MG/DL (ref 0.2–0.7)
BUN BLDV-MCNC: 15 MG/DL (ref 6–20)
CALCIUM SERPL-MCNC: 9.8 MG/DL (ref 8.5–9.9)
CHLORIDE BLD-SCNC: 95 MEQ/L (ref 95–107)
CO2: 24 MEQ/L (ref 20–31)
CREAT SERPL-MCNC: 0.53 MG/DL (ref 0.5–0.9)
GFR AFRICAN AMERICAN: >60
GFR NON-AFRICAN AMERICAN: >60
GLOBULIN: 2.6 G/DL (ref 2.3–3.5)
GLUCOSE BLD-MCNC: 322 MG/DL (ref 70–99)
HCT VFR BLD CALC: 33.9 % (ref 37–47)
HEMOGLOBIN: 10.9 G/DL (ref 12–16)
INR BLD: 1.1
MCH RBC QN AUTO: 26.8 PG (ref 27–31.3)
MCHC RBC AUTO-ENTMCNC: 32.1 % (ref 33–37)
MCV RBC AUTO: 83.4 FL (ref 82–100)
PDW BLD-RTO: 17.3 % (ref 11.5–14.5)
PLATELET # BLD: 363 K/UL (ref 130–400)
POTASSIUM SERPL-SCNC: 4 MEQ/L (ref 3.4–4.9)
PROTHROMBIN TIME: 13.8 SEC (ref 12.3–14.9)
RBC # BLD: 4.07 M/UL (ref 4.2–5.4)
SODIUM BLD-SCNC: 135 MEQ/L (ref 135–144)
TOTAL PROTEIN: 6.2 G/DL (ref 6.3–8)
WBC # BLD: 5.5 K/UL (ref 4.8–10.8)

## 2020-06-25 PROCEDURE — 99244 OFF/OP CNSLTJ NEW/EST MOD 40: CPT | Performed by: NURSE PRACTITIONER

## 2020-06-25 PROCEDURE — U0003 INFECTIOUS AGENT DETECTION BY NUCLEIC ACID (DNA OR RNA); SEVERE ACUTE RESPIRATORY SYNDROME CORONAVIRUS 2 (SARS-COV-2) (CORONAVIRUS DISEASE [COVID-19]), AMPLIFIED PROBE TECHNIQUE, MAKING USE OF HIGH THROUGHPUT TECHNOLOGIES AS DESCRIBED BY CMS-2020-01-R: HCPCS

## 2020-06-25 RX ORDER — MAGNESIUM GLUCONATE 27 MG(500)
500 TABLET ORAL 2 TIMES DAILY
COMMUNITY

## 2020-06-25 ASSESSMENT — PATIENT HEALTH QUESTIONNAIRE - PHQ9
1. LITTLE INTEREST OR PLEASURE IN DOING THINGS: 0
2. FEELING DOWN, DEPRESSED OR HOPELESS: 0
SUM OF ALL RESPONSES TO PHQ QUESTIONS 1-9: 0
SUM OF ALL RESPONSES TO PHQ QUESTIONS 1-9: 0
SUM OF ALL RESPONSES TO PHQ9 QUESTIONS 1 & 2: 0

## 2020-06-25 ASSESSMENT — ENCOUNTER SYMPTOMS
CONSTIPATION: 0
WHEEZING: 1
ABDOMINAL PAIN: 0
SINUS PAIN: 0
BACK PAIN: 1
SORE THROAT: 0
COUGH: 1
DIARRHEA: 1
NAUSEA: 0
SINUS PRESSURE: 0
VOMITING: 0
RHINORRHEA: 0
SHORTNESS OF BREATH: 1

## 2020-06-25 NOTE — PROGRESS NOTES
name: Not on file    Number of children: Not on file    Years of education: Not on file    Highest education level: Not on file   Occupational History    Not on file   Social Needs    Financial resource strain: Not on file    Food insecurity     Worry: Not on file     Inability: Not on file    Transportation needs     Medical: Not on file     Non-medical: Not on file   Tobacco Use    Smoking status: Former Smoker     Packs/day: 1.50     Years: 18.00     Pack years: 27.00     Types: Cigarettes     Last attempt to quit:      Years since quittin.4    Smokeless tobacco: Never Used   Substance and Sexual Activity    Alcohol use: Yes     Frequency: Never     Comment: socially    Drug use: Never    Sexual activity: Not Currently   Lifestyle    Physical activity     Days per week: Not on file     Minutes per session: Not on file    Stress: Not on file   Relationships    Social connections     Talks on phone: Not on file     Gets together: Not on file     Attends [employer] service: Not on file     Active member of club or organization: Not on file     Attends meetings of clubs or organizations: Not on file     Relationship status: Not on file    Intimate partner violence     Fear of current or ex partner: Not on file     Emotionally abused: Not on file     Physically abused: Not on file     Forced sexual activity: Not on file   Other Topics Concern    Not on file   Social History Narrative    Not on file       Review of Systems  Review of Systems   Constitutional: Negative for chills and fever. HENT: Negative for congestion, ear pain, postnasal drip, rhinorrhea, sinus pressure, sinus pain and sore throat. Respiratory: Positive for cough, shortness of breath and wheezing. On home oxygen   Cardiovascular: Negative for chest pain. Gastrointestinal: Positive for diarrhea. Negative for abdominal pain, constipation, nausea and vomiting.    Genitourinary: Negative for dysuria, frequency, hematuria and urgency. Musculoskeletal: Positive for back pain (lower back, patient pulled a muscle). Negative for myalgias. Skin: Negative for rash. Neurological: Negative for dizziness, light-headedness and headaches. Hematological: Bruises/bleeds easily. Physical Exam   Constitutional: She is oriented to person, place, and time. She appears well-developed and obese No distress. HENT:   Head: Normocephalic and atraumatic. Mouth/Throat: Uvula is midline, oropharynx is clear and moist and mucous membranes are normal.   Eyes: Conjunctivae and EOM are normal. Pupils are equal, round, and reactive to light. Neck: Trachea normal and normal range of motion. Neck supple. No JVD present. No mass and no thyromegaly present. Cardiovascular: Tachycardic rate, regular rhythm, normal heart sounds and intact distal pulses. No murmur heard. Pulmonary/Chest: Effort normal and breath sounds normal. No respiratory distress. She has no wheezes. She has no rales. Abdominal: Soft. Bowel sounds are normal. She exhibits no distension and no mass. No tenderness. Musculoskeletal: She exhibits no edema and no tenderness. Neurological: She is alert and oriented to person, place, and time. She has normal strength. No cranial nerve deficit or sensory deficit. Coordination and gait normal.   Skin: Skin is warm and dry. No rash noted. No erythema. Psychiatric: She has a normal mood and affect.  Her behavior is normal.     EKG Interpretation: 05/06/2020: Sinus tachycardia    Lab Review:  Lab Results   Component Value Date     06/25/2020    K 4.0 06/25/2020    CL 95 06/25/2020    CO2 24 06/25/2020    BUN 15 06/25/2020    CREATININE 0.53 06/25/2020    GLUCOSE 322 (H) 06/25/2020    CALCIUM 9.8 06/25/2020    PROT 6.2 (L) 06/25/2020    LABALBU 3.6 06/25/2020    BILITOT 0.3 06/25/2020    ALKPHOS 100 06/25/2020    AST 44 (H) 06/25/2020    ALT 73 (H) 06/25/2020    LABGLOM >60.0 06/25/2020    GFRAA >60.0 06/25/2020 GLOB 2.6 06/25/2020       Lab Results   Component Value Date    WBC 5.5 06/25/2020    HGB 10.9 (L) 06/25/2020    HCT 33.9 (L) 06/25/2020    MCV 83.4 06/25/2020     06/25/2020     Lab Results   Component Value Date    APTT 29.6 06/25/2020     Lab Results   Component Value Date    INR 1.1 06/25/2020    INR 1.0 03/04/2020    INR 1.0 07/31/2019    PROTIME 13.8 06/25/2020    PROTIME 13.7 03/04/2020    PROTIME 13.3 07/31/2019        Assessment:       45 y.o. patient with planned surgery as above. Known risk factors for perioperative complications: Anesthesia concerns- prolonged emergence, history of PE, Diabetes mellitus, bleeding concerns-on Eliquis  Current medications which may produce withdrawal symptoms if withheld perioperatively: Celexa      Plan:     1. Preoperative workup as follows: ECG, hemoglobin, hematocrit, electrolytes, creatinine, glucose, liver function studies, coagulation studies, COVID  2. Change in medication regimen before surgery: Patient to stop Eliquis one day prior to procedure. Hold all other medications on morning of surgery. 3. Prophylaxis for cardiac events with perioperative beta-blockers: Not indicated  ACC/AHA indications for pre-operative beta-blocker use:    · Vascular surgery with history of postitive stress test  · Intermediate or high risk surgery with history of CAD   · Intermediate or high risk surgery with multiple clinical predictors of CAD- 2 of the following: history of compensated or prior heart failure, history of cerebrovascular disease, DM, or renal insufficiency    Routine administration of higher-dose, long-acting metoprolol in beta-blocker-naïve patients on the day of surgery, and in the absence of dose titration is associated with an overall increase in mortality. Beta-blockers should be started days to weeks prior to surgery and titrated to pulse < 70.  4. Deep vein thrombosis prophylaxis: regimen to be chosen by surgical team  5.  No contraindications to

## 2020-06-27 LAB
SARS-COV-2: NOT DETECTED
SOURCE: NORMAL

## 2020-06-30 ENCOUNTER — ANESTHESIA EVENT (OUTPATIENT)
Dept: OPERATING ROOM | Age: 40
End: 2020-06-30
Payer: COMMERCIAL

## 2020-07-01 ENCOUNTER — HOSPITAL ENCOUNTER (OUTPATIENT)
Age: 40
Setting detail: OUTPATIENT SURGERY
Discharge: HOME | End: 2020-07-01
Attending: COLON & RECTAL SURGERY | Admitting: COLON & RECTAL SURGERY
Payer: COMMERCIAL

## 2020-07-01 ENCOUNTER — HOSPITAL ENCOUNTER (OUTPATIENT)
Dept: GENERAL RADIOLOGY | Age: 40
Setting detail: OUTPATIENT SURGERY
Discharge: HOME | End: 2020-07-03
Attending: COLON & RECTAL SURGERY
Payer: COMMERCIAL

## 2020-07-01 ENCOUNTER — ANESTHESIA (OUTPATIENT)
Dept: OPERATING ROOM | Age: 40
End: 2020-07-01
Payer: COMMERCIAL

## 2020-07-01 ENCOUNTER — APPOINTMENT (OUTPATIENT)
Dept: GENERAL RADIOLOGY | Age: 40
End: 2020-07-01
Attending: COLON & RECTAL SURGERY
Payer: COMMERCIAL

## 2020-07-01 VITALS
DIASTOLIC BLOOD PRESSURE: 62 MMHG | SYSTOLIC BLOOD PRESSURE: 139 MMHG | RESPIRATION RATE: 34 BRPM | OXYGEN SATURATION: 96 %

## 2020-07-01 VITALS
TEMPERATURE: 97.6 F | RESPIRATION RATE: 20 BRPM | SYSTOLIC BLOOD PRESSURE: 101 MMHG | HEART RATE: 122 BPM | WEIGHT: 293 LBS | OXYGEN SATURATION: 95 % | DIASTOLIC BLOOD PRESSURE: 59 MMHG | BODY MASS INDEX: 41.95 KG/M2 | HEIGHT: 70 IN

## 2020-07-01 LAB
GLUCOSE BLD-MCNC: 218 MG/DL (ref 60–115)
GLUCOSE BLD-MCNC: 251 MG/DL (ref 60–115)
PERFORMED ON: ABNORMAL
PERFORMED ON: ABNORMAL

## 2020-07-01 PROCEDURE — 3700000000 HC ANESTHESIA ATTENDED CARE: Performed by: COLON & RECTAL SURGERY

## 2020-07-01 PROCEDURE — 2580000003 HC RX 258: Performed by: COLON & RECTAL SURGERY

## 2020-07-01 PROCEDURE — 2709999900 HC NON-CHARGEABLE SUPPLY: Performed by: COLON & RECTAL SURGERY

## 2020-07-01 PROCEDURE — 2580000003 HC RX 258: Performed by: ANESTHESIOLOGY

## 2020-07-01 PROCEDURE — 3600000014 HC SURGERY LEVEL 4 ADDTL 15MIN: Performed by: COLON & RECTAL SURGERY

## 2020-07-01 PROCEDURE — 3209999900 FLUORO FOR SURGICAL PROCEDURES

## 2020-07-01 PROCEDURE — 6360000002 HC RX W HCPCS: Performed by: COLON & RECTAL SURGERY

## 2020-07-01 PROCEDURE — 2500000003 HC RX 250 WO HCPCS: Performed by: COLON & RECTAL SURGERY

## 2020-07-01 PROCEDURE — 7100000000 HC PACU RECOVERY - FIRST 15 MIN: Performed by: COLON & RECTAL SURGERY

## 2020-07-01 PROCEDURE — C1788 PORT, INDWELLING, IMP: HCPCS | Performed by: COLON & RECTAL SURGERY

## 2020-07-01 PROCEDURE — 36561 INSERT TUNNELED CV CATH: CPT | Performed by: COLON & RECTAL SURGERY

## 2020-07-01 PROCEDURE — 6370000000 HC RX 637 (ALT 250 FOR IP): Performed by: COLON & RECTAL SURGERY

## 2020-07-01 PROCEDURE — C1769 GUIDE WIRE: HCPCS | Performed by: COLON & RECTAL SURGERY

## 2020-07-01 PROCEDURE — 3700000001 HC ADD 15 MINUTES (ANESTHESIA): Performed by: COLON & RECTAL SURGERY

## 2020-07-01 PROCEDURE — 7100000010 HC PHASE II RECOVERY - FIRST 15 MIN: Performed by: COLON & RECTAL SURGERY

## 2020-07-01 PROCEDURE — 7100000011 HC PHASE II RECOVERY - ADDTL 15 MIN: Performed by: COLON & RECTAL SURGERY

## 2020-07-01 PROCEDURE — 7100000001 HC PACU RECOVERY - ADDTL 15 MIN: Performed by: COLON & RECTAL SURGERY

## 2020-07-01 PROCEDURE — 71045 X-RAY EXAM CHEST 1 VIEW: CPT

## 2020-07-01 PROCEDURE — 77001 FLUOROGUIDE FOR VEIN DEVICE: CPT | Performed by: COLON & RECTAL SURGERY

## 2020-07-01 PROCEDURE — 3600000004 HC SURGERY LEVEL 4 BASE: Performed by: COLON & RECTAL SURGERY

## 2020-07-01 PROCEDURE — 6360000002 HC RX W HCPCS: Performed by: NURSE ANESTHETIST, CERTIFIED REGISTERED

## 2020-07-01 DEVICE — PORT POWER INJ SMARTPORT TI DET SIL 9.6FR: Type: IMPLANTABLE DEVICE | Site: CHEST | Status: FUNCTIONAL

## 2020-07-01 RX ORDER — DIPHENHYDRAMINE HYDROCHLORIDE 50 MG/ML
12.5 INJECTION INTRAMUSCULAR; INTRAVENOUS
Status: DISCONTINUED | OUTPATIENT
Start: 2020-07-01 | End: 2020-07-01 | Stop reason: HOSPADM

## 2020-07-01 RX ORDER — SODIUM CHLORIDE, SODIUM LACTATE, POTASSIUM CHLORIDE, CALCIUM CHLORIDE 600; 310; 30; 20 MG/100ML; MG/100ML; MG/100ML; MG/100ML
INJECTION, SOLUTION INTRAVENOUS CONTINUOUS
Status: DISCONTINUED | OUTPATIENT
Start: 2020-07-01 | End: 2020-07-01 | Stop reason: HOSPADM

## 2020-07-01 RX ORDER — MAGNESIUM HYDROXIDE 1200 MG/15ML
LIQUID ORAL CONTINUOUS PRN
Status: COMPLETED | OUTPATIENT
Start: 2020-07-01 | End: 2020-07-01

## 2020-07-01 RX ORDER — PROPOFOL 10 MG/ML
INJECTION, EMULSION INTRAVENOUS CONTINUOUS PRN
Status: DISCONTINUED | OUTPATIENT
Start: 2020-07-01 | End: 2020-07-01 | Stop reason: SDUPTHER

## 2020-07-01 RX ORDER — LIDOCAINE HYDROCHLORIDE 10 MG/ML
1 INJECTION, SOLUTION EPIDURAL; INFILTRATION; INTRACAUDAL; PERINEURAL
Status: DISCONTINUED | OUTPATIENT
Start: 2020-07-01 | End: 2020-07-01 | Stop reason: HOSPADM

## 2020-07-01 RX ORDER — ONDANSETRON 2 MG/ML
4 INJECTION INTRAMUSCULAR; INTRAVENOUS
Status: DISCONTINUED | OUTPATIENT
Start: 2020-07-01 | End: 2020-07-01 | Stop reason: HOSPADM

## 2020-07-01 RX ORDER — HYDROCODONE BITARTRATE AND ACETAMINOPHEN 5; 325 MG/1; MG/1
2 TABLET ORAL PRN
Status: DISCONTINUED | OUTPATIENT
Start: 2020-07-01 | End: 2020-07-01 | Stop reason: HOSPADM

## 2020-07-01 RX ORDER — ONDANSETRON 2 MG/ML
INJECTION INTRAMUSCULAR; INTRAVENOUS PRN
Status: DISCONTINUED | OUTPATIENT
Start: 2020-07-01 | End: 2020-07-01 | Stop reason: SDUPTHER

## 2020-07-01 RX ORDER — FENTANYL CITRATE 50 UG/ML
50 INJECTION, SOLUTION INTRAMUSCULAR; INTRAVENOUS EVERY 10 MIN PRN
Status: DISCONTINUED | OUTPATIENT
Start: 2020-07-01 | End: 2020-07-01 | Stop reason: HOSPADM

## 2020-07-01 RX ORDER — SODIUM CHLORIDE 0.9 % (FLUSH) 0.9 %
10 SYRINGE (ML) INJECTION PRN
Status: DISCONTINUED | OUTPATIENT
Start: 2020-07-01 | End: 2020-07-01 | Stop reason: HOSPADM

## 2020-07-01 RX ORDER — MEPERIDINE HYDROCHLORIDE 25 MG/ML
12.5 INJECTION INTRAMUSCULAR; INTRAVENOUS; SUBCUTANEOUS EVERY 5 MIN PRN
Status: DISCONTINUED | OUTPATIENT
Start: 2020-07-01 | End: 2020-07-01 | Stop reason: HOSPADM

## 2020-07-01 RX ORDER — BUPIVACAINE HYDROCHLORIDE AND EPINEPHRINE 2.5; 5 MG/ML; UG/ML
INJECTION, SOLUTION EPIDURAL; INFILTRATION; INTRACAUDAL; PERINEURAL PRN
Status: DISCONTINUED | OUTPATIENT
Start: 2020-07-01 | End: 2020-07-01 | Stop reason: ALTCHOICE

## 2020-07-01 RX ORDER — MIDAZOLAM HYDROCHLORIDE 1 MG/ML
INJECTION INTRAMUSCULAR; INTRAVENOUS PRN
Status: DISCONTINUED | OUTPATIENT
Start: 2020-07-01 | End: 2020-07-01 | Stop reason: SDUPTHER

## 2020-07-01 RX ORDER — FENTANYL CITRATE 50 UG/ML
INJECTION, SOLUTION INTRAMUSCULAR; INTRAVENOUS PRN
Status: DISCONTINUED | OUTPATIENT
Start: 2020-07-01 | End: 2020-07-01 | Stop reason: SDUPTHER

## 2020-07-01 RX ORDER — METOCLOPRAMIDE HYDROCHLORIDE 5 MG/ML
10 INJECTION INTRAMUSCULAR; INTRAVENOUS
Status: DISCONTINUED | OUTPATIENT
Start: 2020-07-01 | End: 2020-07-01 | Stop reason: HOSPADM

## 2020-07-01 RX ORDER — WOUND DRESSING ADHESIVE - LIQUID
LIQUID MISCELLANEOUS PRN
Status: DISCONTINUED | OUTPATIENT
Start: 2020-07-01 | End: 2020-07-01 | Stop reason: ALTCHOICE

## 2020-07-01 RX ORDER — HEPARIN SODIUM (PORCINE) LOCK FLUSH IV SOLN 100 UNIT/ML 100 UNIT/ML
SOLUTION INTRAVENOUS PRN
Status: DISCONTINUED | OUTPATIENT
Start: 2020-07-01 | End: 2020-07-01 | Stop reason: ALTCHOICE

## 2020-07-01 RX ORDER — PROPOFOL 10 MG/ML
INJECTION, EMULSION INTRAVENOUS PRN
Status: DISCONTINUED | OUTPATIENT
Start: 2020-07-01 | End: 2020-07-01 | Stop reason: SDUPTHER

## 2020-07-01 RX ORDER — SODIUM CHLORIDE 0.9 % (FLUSH) 0.9 %
10 SYRINGE (ML) INJECTION EVERY 12 HOURS SCHEDULED
Status: DISCONTINUED | OUTPATIENT
Start: 2020-07-01 | End: 2020-07-01 | Stop reason: HOSPADM

## 2020-07-01 RX ORDER — OXYCODONE HYDROCHLORIDE AND ACETAMINOPHEN 5; 325 MG/1; MG/1
1 TABLET ORAL EVERY 6 HOURS PRN
Qty: 10 TABLET | Refills: 0 | Status: SHIPPED | OUTPATIENT
Start: 2020-07-01 | End: 2020-07-04

## 2020-07-01 RX ORDER — HYDROCODONE BITARTRATE AND ACETAMINOPHEN 5; 325 MG/1; MG/1
1 TABLET ORAL PRN
Status: DISCONTINUED | OUTPATIENT
Start: 2020-07-01 | End: 2020-07-01 | Stop reason: HOSPADM

## 2020-07-01 RX ADMIN — FENTANYL CITRATE 100 MCG: 50 INJECTION, SOLUTION INTRAMUSCULAR; INTRAVENOUS at 10:58

## 2020-07-01 RX ADMIN — PROPOFOL 75 MCG/KG/MIN: 10 INJECTION, EMULSION INTRAVENOUS at 10:59

## 2020-07-01 RX ADMIN — VANCOMYCIN HYDROCHLORIDE 1000 MG: 1 INJECTION, POWDER, LYOPHILIZED, FOR SOLUTION INTRAVENOUS at 10:31

## 2020-07-01 RX ADMIN — ONDANSETRON 4 MG: 2 INJECTION INTRAMUSCULAR; INTRAVENOUS at 11:39

## 2020-07-01 RX ADMIN — MIDAZOLAM HYDROCHLORIDE 1 MG: 2 INJECTION, SOLUTION INTRAMUSCULAR; INTRAVENOUS at 11:16

## 2020-07-01 RX ADMIN — PROPOFOL 100 MG: 10 INJECTION, EMULSION INTRAVENOUS at 11:30

## 2020-07-01 RX ADMIN — MIDAZOLAM HYDROCHLORIDE 1 MG: 2 INJECTION, SOLUTION INTRAMUSCULAR; INTRAVENOUS at 11:04

## 2020-07-01 RX ADMIN — SODIUM CHLORIDE, POTASSIUM CHLORIDE, SODIUM LACTATE AND CALCIUM CHLORIDE: 600; 310; 30; 20 INJECTION, SOLUTION INTRAVENOUS at 10:10

## 2020-07-01 RX ADMIN — MIDAZOLAM HYDROCHLORIDE 2 MG: 2 INJECTION, SOLUTION INTRAMUSCULAR; INTRAVENOUS at 10:58

## 2020-07-01 ASSESSMENT — PULMONARY FUNCTION TESTS
PIF_VALUE: 21
PIF_VALUE: 21
PIF_VALUE: 0
PIF_VALUE: 20
PIF_VALUE: 20
PIF_VALUE: 0
PIF_VALUE: 16
PIF_VALUE: 0
PIF_VALUE: 21
PIF_VALUE: 0
PIF_VALUE: 0
PIF_VALUE: 20
PIF_VALUE: 21
PIF_VALUE: 0
PIF_VALUE: 20
PIF_VALUE: 0
PIF_VALUE: 0
PIF_VALUE: 21
PIF_VALUE: 0
PIF_VALUE: 20
PIF_VALUE: 0
PIF_VALUE: 21
PIF_VALUE: 0
PIF_VALUE: 19
PIF_VALUE: 20
PIF_VALUE: 0
PIF_VALUE: 21
PIF_VALUE: 21
PIF_VALUE: 0
PIF_VALUE: 21
PIF_VALUE: 21
PIF_VALUE: 20
PIF_VALUE: 20
PIF_VALUE: 0
PIF_VALUE: 0
PIF_VALUE: 20
PIF_VALUE: 1
PIF_VALUE: 21
PIF_VALUE: 21
PIF_VALUE: 16
PIF_VALUE: 21
PIF_VALUE: 0
PIF_VALUE: 21
PIF_VALUE: 21
PIF_VALUE: 0
PIF_VALUE: 21
PIF_VALUE: 0
PIF_VALUE: 17
PIF_VALUE: 21
PIF_VALUE: 20
PIF_VALUE: 21
PIF_VALUE: 21
PIF_VALUE: 0
PIF_VALUE: 0
PIF_VALUE: 21
PIF_VALUE: 22
PIF_VALUE: 20
PIF_VALUE: 6
PIF_VALUE: 0
PIF_VALUE: 21
PIF_VALUE: 0
PIF_VALUE: 20
PIF_VALUE: 22
PIF_VALUE: 21
PIF_VALUE: 20
PIF_VALUE: 0
PIF_VALUE: 21
PIF_VALUE: 0
PIF_VALUE: 20
PIF_VALUE: 21
PIF_VALUE: 15
PIF_VALUE: 0

## 2020-07-01 ASSESSMENT — PAIN DESCRIPTION - DESCRIPTORS: DESCRIPTORS: ACHING

## 2020-07-01 ASSESSMENT — PAIN - FUNCTIONAL ASSESSMENT: PAIN_FUNCTIONAL_ASSESSMENT: 0-10

## 2020-07-01 NOTE — ANESTHESIA POSTPROCEDURE EVALUATION
Department of Anesthesiology  Postprocedure Note    Patient: Griselda Trevino  MRN: [id number]  YOB: 1980  Date of evaluation: 7/1/2020  Time:  12:24 PM     Procedure Summary     Date:  07/01/20 Room / Location:  [address]    Anesthesia Start:  8199 Anesthesia Stop:      Procedure:  Jameel Formica / REMOVAL OF PICC LINE (N/A Chest) Diagnosis:  (LUNG CANCER)    Surgeon:  Stewart Alfaro MD Responsible Provider:  THONG Nichols CRNA    Anesthesia Type:  MAC ASA Status:  3          Anesthesia Type: MAC    Jacky Phase I:      Jacky Phase II:      Last vitals: Reviewed and per EMR flowsheets.        Anesthesia Post Evaluation    Patient location during evaluation: PACU  Patient participation: complete - patient participated  Level of consciousness: awake  Pain score: 0  Airway patency: patent  Nausea & Vomiting: no nausea  Complications: no  Cardiovascular status: hemodynamically stable  Respiratory status: acceptable  Hydration status: stable Results for orders placed or performed in visit on 03/31/20   Cardiac EP device report    Narrative    MDT-SINGLE CHAMBER "HIS" PPM  CARELINK TRANSMISSION: BATTERY VOLTAGE ADEQUATE (7 1 YRS)   99 6% (DEPENDENT- S/P AVN ABLATION)  ALL AVAILABLE LEAD PARAMETERS WITHIN NORMAL LIMITS  NO SIGNIFICANT HIGH RATE EPISODES  HX: CHRONIC AF & PT ON ASA 81, WARFARIN  NORMAL DEVICE FUNCTION      EB

## 2020-07-01 NOTE — BRIEF OP NOTE
Department of General Surgery - Adult  Surgical Service general surgery  Brief Operative Report      Pre-operative Diagnosis: Need for central access    Post-operative Diagnosis: Same    Procedure: Left subclavian Fgxuot-b-Zvgs placement, removal of right arm PICC line    Surgeon: Naty Contreras     Assistant(s):  Cynthia Maher    Anesthesia: MAC with local    Estimated blood loss: 50    Total Urine Output: Not recorded     Drains: None    Specimens: None    Implants: 9.6 Dutch pre-attached Dnouls-j-Lnqd    Findings: Left subclavian Yekcul-s-Xhjg placed through Seldinger technique    Complications: None    Condition:  stable    See dictated operative report for full details.

## 2020-07-01 NOTE — ANESTHESIA PRE PROCEDURE
Department of Anesthesiology  Preprocedure Note       Name:  Jen Xiao   Age:  45 y.o.  :  1980                                          MRN:  [id number]         Date:  2020      Surgeon: Ole Martínez):  Joslyn Quinn MD    Procedure: Procedure(s): INFUSAPORT / REMOVAL OF PICC LINE  (PAT AT [facility])    Medications prior to admission:   Prior to Admission medications    Medication Sig Start Date End Date Taking? Authorizing Provider   metFORMIN (GLUCOPHAGE) 500 MG tablet take 1 tablet by mouth twice a day with meals 20  Yes Zarina Mills MD   dexamethasone (DECADRON) 4 MG tablet Take 1 tablet by mouth 2 times daily (with meals) 20  Yes Afshin Mitchell MD   insulin aspart protamine-insulin aspart (NOVOLOG MIX 70/30 FLEXPEN) (70-30) 100 UNIT/ML injection Inject 45-55 units BID 20  Yes Zarina Mills MD   budesonide-formoterol (SYMBICORT) 160-4.5 MCG/ACT AERO Inhale 2 puffs into the lungs 2 times daily 20  Yes Peyton Slater MD   Insulin Pen Needle (NOVOFINE) 32G X 6 MM MISC Bid 19  Yes Zarina Mills MD   citalopram (CELEXA) 10 MG tablet Take 10 mg by mouth nightly    Yes Historical Provider, MD   magnesium gluconate (MAGONATE) 500 MG tablet Take 500 mg by mouth 2 times daily    Historical Provider, MD   blood glucose test strips (ONETOUCH VERIO) strip 1 each by In Vitro route 2 times daily As needed. 19   Zarina Mills MD   Blood Glucose Monitoring Suppl (Efren Lucas) w/Device KIT As directed 19   Zarina Mills MD   Lancets (ONETOUCH DELICA PLUS [id number]) MISC bid 19   Zarina Mills MD   apixaban (ELIQUIS STARTER PACK) 5 MG TABS tablet Take 10 mg (2 tablets) orally twice daily for 7 days, then take 5 mg (1 tablet) orally twice daily thereafter.  19   Uriel Elaine MD       Current medications:    Current Facility-Administered Medications   Medication Dose Route Frequency Provider Last Rate Last Dose    vancomycin 1000 mg IVPB in 250 mL D5W addavial  1,000 mg Intravenous On Call to Troy Roque MD        lactated ringers infusion   Intravenous Continuous Emma Ruano MD        fentaNYL (SUBLIMAZE) injection 50 mcg  50 mcg Intravenous Q10 Min PRN Emma Ruano MD        HYDROmorphone (DILAUDID) injection 0.5 mg  0.5 mg Intravenous Q10 Min PRN Emma Ruano MD        HYDROcodone-acetaminophen [facility]) 5-325 MG per tablet 1 tablet  1 tablet Oral PRN Emma Ruano MD        Or    HYDROcodone-acetaminophen [facility]) 5-325 MG per tablet 2 tablet  2 tablet Oral PRN Emma Ruano MD        diphenhydrAMINE (BENADRYL) injection 12.5 mg  12.5 mg Intravenous Once PRN Emma Ruano MD        ondansetron [facility]) injection 4 mg  4 mg Intravenous Once PRN Emma Ruano MD        metoclopramide [facility]) injection 10 mg  10 mg Intravenous Once PRN Emma Ruano MD        meperidine (DEMEROL) injection 12.5 mg  12.5 mg Intravenous Q5 Min PRN Emma Ruano MD        lactated ringers infusion   Intravenous Continuous Emma Ruano MD        sodium chloride flush 0.9 % injection 10 mL  10 mL Intravenous 2 times per day Emma Ruano MD        sodium chloride flush 0.9 % injection 10 mL  10 mL Intravenous PRN Emma Ruano MD        lidocaine PF 1 % injection 1 mL  1 mL Intradermal Once PRN Emma Ruano MD           Allergies:     Allergies   Allergen Reactions    Pcn [Penicillins] Hives    Sulfa Antibiotics Hives    Sulfites Other (See Comments)     Headache; Nausea       Problem List:    Patient Active Problem List   Diagnosis Code    Lung mass R91.8    H/O: hysterectomy Z90.710    Abnormal vaginal bleeding N93.9    Cavitating mass in right middle lung lobe J98.4    Endometrial cancer ([facility] C54.1    Hemoptysis R04.2    Bacteremia R78.81    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 C34.90    Mediastinal lymphadenopathy R59.0    Hilar lymphadenopathy R59.0    History of endometrial cancer Z85.42    Pulmonary embolism and infarction (HCC) I26.99    Lung cancer metastatic to brain (HCC) C34.90, C79.31    PNA (pneumonia) J18.9       Past Medical History:        Diagnosis Date    Brain cancer ([facility]     Endometrial cancer ([facility]     chemo and radiation    Lung cancer ([facility]     Prolonged emergence from general anesthesia     Type 2 diabetes mellitus without complication ([facility]        Past Surgical History:        Procedure Laterality Date    BRONCHOSCOPY N/A 2019    BRONCHOSCOPY performed by Cristiana Jade MD at [address]      HYSTERECTOMY  10/2015    LUNG BIOPSY Right 2019    Dr Annette Solomon  performed    LUNG BIOPSY         Social History:    Social History     Tobacco Use    Smoking status: Former Smoker     Packs/day: 1.50     Years: 18.00     Pack years: 27.00     Types: Cigarettes     Last attempt to quit:      Years since quittin.5    Smokeless tobacco: Never Used   Substance Use Topics    Alcohol use: Yes     Frequency: Never     Comment: socially                                Counseling given: Not Answered      Vital Signs (Current): There were no vitals filed for this visit.                                            BP Readings from Last 3 Encounters:   20 120/76   20 124/68   20 115/64       NPO Status:                                                                                 BMI:   Wt Readings from Last 3 Encounters:   20 (!) 345 lb (156.5 kg)   20 (!) 356 lb 1.6 oz (161.5 kg)   20 (!) 336 lb (152.4 kg)     There is no height or weight on file to calculate BMI.    CBC:   Lab Results   Component Value Date    WBC 5.5 2020    RBC 4.07 2020    HGB 10.9 2020    HCT 33.9 2020    MCV 83.4 2020    RDW 17.3 2020     2020       CMP: Lab Results   Component Value Date     06/25/2020    K 4.0 06/25/2020    K 4.3 08/01/2019    CL 95 06/25/2020    CO2 24 06/25/2020    BUN 15 06/25/2020    CREATININE 0.53 06/25/2020    GFRAA >60.0 06/25/2020    LABGLOM >60.0 06/25/2020    GLUCOSE 322 06/25/2020    PROT 6.2 06/25/2020    CALCIUM 9.8 06/25/2020    BILITOT 0.3 06/25/2020    ALKPHOS 100 06/25/2020    AST 44 06/25/2020    ALT 73 06/25/2020       POC Tests: No results for input(s): POCGLU, POCNA, POCK, POCCL, POCBUN, POCHEMO, POCHCT in the last 72 hours. Coags:   Lab Results   Component Value Date    PROTIME 13.8 06/25/2020    INR 1.1 06/25/2020    APTT 29.6 06/25/2020       HCG (If Applicable): No results found for: PREGTESTUR, PREGSERUM, HCG, HCGQUANT     ABGs: No results found for: PHART, PO2ART, CSB0ENH, UVS7KFE, BEART, P4QKXTOK     Type & Screen (If Applicable):  No results found for: LABABO, LABRH    Drug/Infectious Status (If Applicable):  No results found for: HIV, HEPCAB    COVID-19 Screening (If Applicable):   Lab Results   Component Value Date    COVID19 Not Detected 06/25/2020         Anesthesia Evaluation  Patient summary reviewed and Nursing notes reviewed no history of anesthetic complications:   Airway: Mallampati: II  TM distance: >3 FB   Neck ROM: full  Mouth opening: > = 3 FB Dental: normal exam         Pulmonary:normal exam    (+) pneumonia:                             Cardiovascular:Negative CV ROS                      Neuro/Psych:   Negative Neuro/Psych ROS  (+) headaches:,             GI/Hepatic/Renal:             Endo/Other:    (+) Diabetes, malignancy/cancer. Abdominal:           Vascular: negative vascular ROS.  + PE. Anesthesia Plan      MAC     ASA 3           MIPS: Prophylactic antiemetics administered. Anesthetic plan and risks discussed with patient. Plan discussed with CRNA.     Attending anesthesiologist reviewed and agrees with Pre Eval content              Liliana Vila MD   7/1/2020

## 2020-12-08 NOTE — PROGRESS NOTES
INPATIENT PROGRESS NOTES    PATIENT NAME: Alexandro Combs  MRN: [id number]  SERVICE DATE:  May 31, 2019   SERVICE TIME:  8:35 AM      PRIMARY SERVICE: Pulmonary Disease    CHIEF COMPLAINTS: hemoptysis     INTERVAL HPI: Patient seen and examined at bedside, Interval Notes, orders reviewed. Nursing notes noted    Patient report doing well, minimal streaks of blood and improving, no issues over night, no chest pain, no SOB, no fever      Review of system:     GI Abdominal pain No  Skin Rash No    Social History     Tobacco Use    Smoking status: Former Smoker     Types: Cigarettes     Last attempt to quit:      Years since quittin.4    Smokeless tobacco: Never Used   Substance Use Topics    Alcohol use: Yes     Frequency: Never     Comment: socially         Problem Relation Age of Onset    Cancer Mother         lymphoma    Diabetes Mother     No Known Problems Father     Diabetes Maternal Grandmother         questionable female cancer    Heart Disease Maternal Grandmother     Cancer Paternal Grandfather         stomach         OBJECTIVE    Body mass index is 42.12 kg/m². PHYSICAL EXAM:  Vitals:  /68   Pulse 78   Temp 97.3 °F (36.3 °C) (Oral)   Resp 16   Ht 5' 11\" (1.803 m)   Wt (!) 302 lb (137 kg)   SpO2 93%   BMI 42.12 kg/m²     General: alert, cooperative, no distress  Head: normocephalic, atraumatic  Eyes:No gross abnormalities. , PERRL and Sclera nonicteric  ENT:  MMM no lesions  Neck:  supple and no masses  Chest : clear to auscultation bilaterally- no wheezes, rales or rhonchi, normal air movement, no respiratory distress  Heart[de-identified] Heart sounds are normal.  Regular rate and rhythm without murmur, gallop or rub. ABD:  symmetric, liver span normal to percussion, soft, non-tender, spleen non-palpable  Musculoskeletal : no cyanosis, no clubbing and no edema  Neuro:  Grossly normal  Skin: No rashes or nodules noted.   Lymph node:  no cervical nodes  Urology: No Farris   Psychiatric: appropriate    DATA:   No results for input(s): WBC, HGB, HCT, MCV, PLT in the last 72 hours. No results for input(s): NA, K, CL, CO2, BUN, CREATININE, GLUCOSE, CALCIUM, PROT, LABALBU, BILITOT, ALKPHOS, AST, ALT, LABGLOM, GFRAA, AGRATIO, GLOB in the last 72 hours. MV Settings:          No results for input(s): PHART, BLI7VMA, PO2ART, NIR8KMO, BEART, W0SATJAX in the last 72 hours. O2 Device: None (Room air)  O2 Flow Rate (L/min): 3 L/min    DIET CLEAR LIQUID;     MEDICATIONS during current hospitalization:    Continuous Infusions:    Scheduled Meds:   sodium chloride flush  10 mL Intravenous 2 times per day    citalopram  10 mg Oral Daily       PRN Meds:sodium chloride flush, magnesium hydroxide, ondansetron, acetaminophen, benzocaine-menthol, hydrALAZINE    Radiology  Xr Chest Standard (2 Vw)    Result Date: 5/25/2019  XR CHEST (2 VW) : 5/24/2019 CLINICAL HISTORY:  COUGH, PAIN . COMPARISON: Chest CT 3/17/2016. TECHNIQUE: Upright PA and lateral radiographs of the chest were obtained. FINDINGS: Since the previous CT, large masses within the mid to lower lung fields have developed bilaterally, measuring up to 12.5 cm of the mid to inferior right upper lobe. Further evaluation with CT is suggested. There is no significant cardiomegaly, pleural effusion, vascular congestion, pneumothorax, or acute complication identified. LARGE PULMONARY MASSES HAVE DEVELOPED SINCE THE PREVIOUS CT. A FOLLOW-UP CT OF THE CHEST IS SUGGESTED. Ct Chest W Contrast    Result Date: 5/25/2019  EXAMINATION:  CT CHEST WITH IV CONTRAST CLINICAL HISTORY:  METASTATIC UTERINE CARCINOMA, PATIENT WITH INSPIRATORY CHEST PAIN AND PRODUCTIVE COUGH, ABNORMAL CHEST X-RAY COMPARISON:  3/17/2016 TECHNIQUE:  CT chest is obtained following IV injection of 75 mL of Isovue-370. Axial and MPR CT images of the chest are obtained. CT images are viewed with pulmonary and mediastinal window settings.  FINDINGS:  The CT chest with IV contrast demonstrates the following: Pulmonary: There are bilateral large inhomogeneously hypodense pulmonary masses. The largest tumor mass is found in the RUL but there are additional relatively large tumor masses in both lower lobes. The right upper lobe mass/consolidation demonstrates central cavitation which may very well represent tumor necrosis. In view of the patient's history of uterine carcinoma, the bilateral pulmonary mass are most likely a manifestation of extensive metastatic disease to both lungs. There are no pleural effusions. There is no pneumothorax. Heart and mediastinum: The heart is not enlarged and there is no pericardial effusion. There is mediastinal and bilateral hilar adenopathy which likely represent metastatic adenopathy. There is no axillary adenopathy and there is no supraclavicular adenopathy. Thoracic aorta and great vessels appear unremarkable. Upper abdomen unremarkable. Osseous structures: Mild degenerative bone spurring in the thoracic spine. No thoracic spine fracture or malalignment. Sternum and ribs appear intact. No osteolytic or osteoblastic metastases visible in the bony thorax. 1. BILATERALLY PULMONARY MASSES COMPATIBLE WITH EXTENSIVE METASTATIC DISEASE TO THE LUNGS. 2. MEDIASTINAL AND BILATERAL HILAR ADENOPATHY LIKELY REPRESENTS METASTATIC ADENOPATHY. All CT scans at this facility use dose modulation, iterative reconstruction, and/or weight based dosing when appropriate to reduce radiation dose to as low as reasonably achievable. Ct Abdomen Pelvis W Iv Contrast Additional Contrast? Radiologist Recommendation    Result Date: 5/25/2019  CT ABDOMEN PELVIS W IV CONTRAST: 5/25/2019 CLINICAL HISTORY:  ENDOMETRIAL CANCER, ASSESS CERVICAL INVOLVEMENT . COMPARISON: 3/17/2016 and chest CT from earlier 5/25/2019. TECHNIQUE: Spiral images were obtained of the abdomen and pelvis neck Isovue.  All CT scans at this facility use dose modulation, iterative reconstruction, and/or weight based dosing when appropriate to reduce radiation dose to as low as reasonably achievable. FINDINGS: Masses within the inferior hemithoraces appear unchanged from the earlier chest CTA. There has been previous hysterectomy. There are no abnormal masses, lymphadenopathy, ascites, or other significant changes in the abdomen and pelvis from 3/17/2016 identified. A moderate to markedly enlarged fatty liver appears unchanged. The spleen remains mildly enlarged. A small calcified gallstone within an otherwise normal-appearing gallstone is noted. The pancreas, adrenal glands, kidneys, great vessels, bowel loops, nearly decompressed urinary bladder, and additional images of pelvis are unremarkable. NO EVIDENCE OF RECURRENT PELVIC MASSES, OR OTHER SIGNIFICANT CHANGES IN THE ABDOMEN AND PELVIS FROM 3/17/2016 IDENTIFIED. ENLARGED FATTY LIVER. CHOLELITHIASIS. Xr Chest Portable    Result Date: 5/30/2019  EXAMINATION: XR CHEST PORTABLE CLINICAL HISTORY:  post bronch COMPARISONS: CT thorax, May 25, 2019 FINDINGS: Single AP portable view the chest obtained showing unremarkable intubation. The large bilateral lung masses, right greater than left persist. There is no pneumothorax. There is no pleural effusion. The mediastinal silhouette is unremarkable. CONCLUSION: LARGE LUNG MASSES. NO PNEUMOTHORAX FOLLOWING BRONCHOSCOPY. Fluoro For Surgical Procedures    Result Date: 5/30/2019  EXAMINATION: FLUORO FOR SURGICAL PROCEDURES CLINICAL HISTORY:  bronch COMPARISONS: 8/25/2019 CT thorax FINDINGS: 3 fluoroscopic images were saved during bronchoscopy, with a bronchoscope directed into the right mainstem bronchus. CONCLUSION: UNREMARKABLE DOCUMENTATION OF BRONCHOSCOPY X-RAY DOSE SUMMARY: 3 FLUOROSCOPIC IMAGES SAVED TO PACS. 0SPOT FILM WAS EXPOSED. 76.8SECOND FLUOROSCOPY TIME. 17.53MGY CUMULATIVE X-RAY DOSE.         CXR reviewed by me , stable, masses noted, no atelectasis     IMPRESSION AND SUGGESTION:  · Metastatic lung disease, likely from uterine ca, S/P biopsy   · Bleed during biopsy, highly vascular tumor, now subsided   · Obesity , ? RAFAEL will need out patient eval     Plan   · Regular diet   · IS   · OK to DC home , follow up with me in 1 week          Electronically signed by Jose Loera MD,  [facility]   on 5/31/2019 at 8:35 AM Admission Reconciliation is Completed  Discharge Reconciliation is Not Complete Admission Reconciliation is Completed  Discharge Reconciliation is Completed

## 2022-05-07 NOTE — PROGRESS NOTES
PICC flushed and has brisk blood return from both lumens. Vitals stable, no complaints from pt. Rizwana Gómez started. 11

## 2023-10-03 NOTE — OP NOTE
Before you come to the hospital        Arrival time: AS DIRECTED BY OFFICE     YOU MAY TAKE THE FOLLOWING MEDICATION(S) THE MORNING OF SURGERY WITH A SIP OF WATER:   Hydrocodone/Acetaminophen (NORCO) if needed for pain  Metoprolol (LOPRESSOR)    Do NOT take your Lisinopril within 24 hours of surgery as directed by anesthesia           ALL OTHER HOME MEDICATION CHECK WITH YOUR PHYSICIAN (especially if   you are taking diabetes medicines or blood thinners)          If you were given and instructed to use a germ- killing soap, use as directed the night before surgery and again the morning of surgery or as directed by your surgeon. (Use one-half of the bottle with each shower.)   See attached information for How to Use Chlorhexidine for Bathing if applicable.            Eating and drinking restrictions prior to scheduled arrival time    2 Hours before arrival time STOP   Drinking Clear liquids (water, black coffee-NO CREAM,  apple juice-no pulp)      8 Hours before arrival time STOP   All food, full liquids, and dairy products    (It is extremely important that you follow these guidelines to prevent delay or cancelation of your procedure)     Clear Liquids  Water and flavored water                                                                      Clear Fruit juices, such as cranberry juice and apple juice.  Black coffee (NO cream of any kind, including powdered).  Plain tea  Clear bouillon or broth.  Flavored gelatin.  Soda.  Gatorade or Powerade.  Full liquid examples  Juices that have pulp.  Frozen ice pops that contain fruit pieces.  Coffee with creamer  Milk.  Yogurt.                MANAGING PAIN AFTER SURGERY    We know you are probably wondering what your pain will be like after surgery.  Following surgery it is unrealistic to expect you will not have pain.   Pain is how our bodies let us know that something is wrong or cautions us to be careful.  That said, our goal is to make your pain tolerable.    Methods  we may use to treat your pain include (oral or IV medications, PCAs, epidurals, nerve blocks, etc.)   While some procedures require IV pain medications for a short time after surgery, transitioning to pain medications by mouth allows for better management of pain.   Your nurse will encourage you to take oral pain medications whenever possible.  IV medications work almost immediately, but only last a short while.  Taking medications by mouth allows for a more constant level of medication in your blood stream for a longer period of time.      Once your pain is out of control it is harder to get back under control.  It is important you are aware when your next dose of pain medication is due.  If you are admitted, your nurse may write the time of your next dose on the white board in your room to help you remember.      We are interested in your pain and encourage you to inform us about aggravating factors during your visit.   Many times a simple repositioning every few hours can make a big difference.    If your physician says it is okay, do not let your pain prevent you from getting out of bed. Be sure to call your nurse for assistance prior to getting up so you do not fall.      Before surgery, please decide your tolerable pain goal.  These faces help describe the pain ratings we use on a 0-10 scale.   Be prepared to tell us your goal and whether or not you take pain or anxiety medications at home.          Preparing for Surgery  Preparing for surgery is an important part of your care. It can make things go more smoothly and help you avoid complications. The steps leading up to surgery may vary among hospitals. Follow all instructions given to you by your health care providers. Ask questions if you do not understand something. Talk about any concerns that you have.  Here are some questions to consider asking before your surgery:  If my surgery is not an emergency (is elective), when would be the best time to have the  were obtained from the  right upper lobe segment under fluoroscopic guidance. After first biopsy patient had significant bleed requiring epinephrine and frequent suctioning, O2 saturation dropped to mid 80s but responded quickly, however in light of apparent tendency to bleed from this lung mass I elected to proceed with intubation to control airways. Then proceeded with 3 more biopsies controlled bleed with epinephrine and cold normal saline flushes. The bronchoscope was placed into the right upper lobe segment where fluoroscopic guided cytologic brushings X  1  were obtained from the anterior segment. Bronchoalveolar lavage of the  right upper lobe segment was then obtained. Endobronchial biopsy right middle lobe bronchus ×3 was then obtained she did have also bleed controlled with epinephrine    Estimated blood loss almost 50 mL, identified the procedure bleeding was controlled but she did have clots left over in the right upper lobe and right middle lobe area.          ESTIMATED BLOOD LOSS:  less than 60 cc    COMPLICATIONS:  Bleeding as noted above  Postprocedure chest x-ray is unremarkable, patient was kept overnight for observation, will repeat chest x-ray tomorrow    Electronically signed by Ortega De Jesus MD [facility],  on 5/30/2019 at 8:37 PM surgery?  What arrangements do I need to make for work, home, or school?  What will my recovery be like? How long will it be before I can return to normal activities?  Will I need to prepare my home? Will I need to arrange care for me or my children?  Should I expect to have pain after surgery? What are my pain management options? Are there nonmedical options that I can try for pain?  Tell a health care provider about:  Any allergies you have.  All medicines you are taking, including vitamins, herbs, eye drops, creams, and over-the-counter medicines.  Any problems you or family members have had with anesthetic medicines.  Any blood disorders you have.  Any surgeries you have had.  Any medical conditions you have.  Whether you are pregnant or may be pregnant.  What are the risks?  The risks and complications of surgery depend on the specific procedure that you have. Discuss all the risks with your health care providers before your surgery. Ask about common surgical complications, which may include:  Infection.  Bleeding or a need for blood replacement (transfusion).  Allergic reactions to medicines.  Damage to surrounding nerves, tissues, or structures.  A blood clot.  Scarring.  Failure of the surgery to correct the problem.  Follow these instructions before the procedure:  Several days or weeks before your procedure  You may have a physical exam by your primary health care provider to make sure it is safe for you to have surgery.  You may have testing. This may include a chest X-ray, blood and urine tests, electrocardiogram (ECG), or other testing.  Ask your health care provider about:  Changing or stopping your regular medicines. This is especially important if you are taking diabetes medicines or blood thinners.  Taking medicines such as aspirin and ibuprofen. These medicines can thin your blood. Do not take these medicines unless your health care provider tells you to take them.  Taking over-the-counter  medicines, vitamins, herbs, and supplements.  Do not use any products that contain nicotine or tobacco, such as cigarettes and e-cigarettes. If you need help quitting, ask your health care provider.  Avoid alcohol.  Ask your health care provider if there are exercises you can do to prepare for surgery.  Eat a healthy diet.   Plan to have someone 18 years of age or older to take you home from the hospital. We will need to verify your ride on the morning of surgery if you are being discharged home on the same day. Tell your ride to be expecting a call from the hospital prior to your procedure.   Plan to have a responsible adult care for you for at least 24 hours after you leave the hospital or clinic. This is important.  The day before your procedure  You may be given antibiotic medicine to take by mouth to help prevent infection. Take it as told by your health care provider.  You may be asked to shower with a germ-killing soap.  Follow instructions from your health care provider about eating and drinking restrictions. This includes gum, mints and hard candy.  Pack comfortable clothes according to your procedure.   The day of your procedure  You may need to take another shower with a germ-killing soap before you leave home in the morning.  With a small sip of water, take only the medicines that you are told to take.  Remove all jewelry including rings.   Leave anything you consider valuable at home except hearing aids if needed.  You do not need to bring your home medications into the hospital.   Do not wear any makeup, nail polish, powder, deodorant, lotion, hair accessories, or anything on your skin or body except your clothes.  If you will be staying in the hospital, bring a case to hold your glasses, contacts, or dentures. You may also want to bring your robe and non-skid footwear.       (Do not use denture adhesives since you will be asked to remove them during  surgery).   If you wear oxygen at home, bring it  with you the day of surgery.  If instructed by your health care provider, bring your sleep apnea device with you on the day of your surgery (if this applies to you).  You may want to leave your suitcase and sleep apnea device in the car until after surgery.   Arrive at the hospital as scheduled.  Bring a friend or family member with you who can help to answer questions and be present while you meet with your health care provider.  At the hospital  When you arrive at the hospital:  Go to registration located at the main entrance of the hospital. You will be registered and given a beeper and a sticker sheet. Take the stickers to the Outpatient nurses desk and place in the black tray. This is to notify staff that you have arrived. Then return to the lobby to wait.   When your beeper lights up and vibrates proceed through the double doors, under the stairs, and a member of the Outpatient Surgery staff will escort you to your preoperative room.  You may have to wear compression sleeves. These help to prevent blood clots and reduce swelling in your legs.  An IV may be inserted into one of your veins.              In the operating room, you may be given one or more of the following:        A medicine to help you relax (sedative).        A medicine to numb the area (local anesthetic).        A medicine to make you fall asleep (general anesthetic).        A medicine that is injected into an area of your body to numb everything below the                      injection site (regional anesthetic).  You may be given an antibiotic through your IV to help prevent infection.  Your surgical site will be marked or identified.    Contact a health care provider if you:  Develop a fever of more than 100.4°F (38°C) or other feelings of illness during the 48 hours before your surgery.  Have symptoms that get worse.  Have questions or concerns about your surgery.  Summary  Preparing for surgery can make the procedure go more smoothly and  lower your risk of complications.  Before surgery, make a list of questions and concerns to discuss with your surgeon. Ask about the risks and possible complications.  In the days or weeks before your surgery, follow all instructions from your health care provider. You may need to stop smoking, avoid alcohol, follow eating restrictions, and change or stop your regular medicines.  Contact your surgeon if you develop a fever or other signs of illness during the few days before your surgery.  This information is not intended to replace advice given to you by your health care provider. Make sure you discuss any questions you have with your health care provider.  Document Revised: 12/21/2018 Document Reviewed: 10/23/2018  [employer] Patient Education © 2021 [employer]

## (undated) DEVICE — COUNTER NDL 40 COUNT HLD 70 FOAM BLK ADH W/ MAG

## (undated) DEVICE — HYPODERMIC SAFETY NEEDLE: Brand: MAGELLAN

## (undated) DEVICE — CANNULA NSL SM AD L7FT 6LPM CLR 3 CHN CRV TAPR LTWT OVR THE

## (undated) DEVICE — DRAPE SURG W41XL74IN CLR FULL SZ C ARM 3 ADH POLY STRP E

## (undated) DEVICE — SUTURE VCRL SZ 3-0 L27IN ABSRB UD L26MM SH 1/2 CIR J416H

## (undated) DEVICE — MARKER SURG SKIN GENTIAN VLT REG TIP W/ 6IN RUL

## (undated) DEVICE — BRUSH ENDO CLN L90.5IN SHTH DIA1.7MM BRIST DIA5-7MM 2-6MM

## (undated) DEVICE — GLOVE ORANGE PI 7 1/2   MSG9075

## (undated) DEVICE — Z CONVERTED USE 2271043 CONTAINER SPEC COLL 4OZ SCR ON LID PEEL PCH

## (undated) DEVICE — RADIFOCUS GLIDEWIRE: Brand: GLIDEWIRE

## (undated) DEVICE — LABEL MED MINI W/ MARKER

## (undated) DEVICE — PENCIL SMK EVAC 10 FT BLADE ELECTRD ROCKER FOR TELSCP

## (undated) DEVICE — Device: Brand: MEDEX

## (undated) DEVICE — PAD,NON-ADHERENT,3X8,STERILE,LF,1/PK: Brand: MEDLINE

## (undated) DEVICE — TUBING, SUCTION, 1/4" X 10', STRAIGHT: Brand: MEDLINE

## (undated) DEVICE — ELECTRODE PT RET AD L9FT HI MOIST COND ADH HYDRGEL CORDED

## (undated) DEVICE — DISPOSABLE CYTOLOGY BRUSH: Brand: DISPOSABLE CYTOLOGY BRUSH

## (undated) DEVICE — SYRINGE, LUER SLIP, STERILE, 10ML: Brand: MEDLINE

## (undated) DEVICE — TOWEL,OR,DSP,ST,BLUE,STD,4/PK,20PK/CS: Brand: MEDLINE

## (undated) DEVICE — SYRINGE, LUER SLIP, 30ML: Brand: MEDLINE

## (undated) DEVICE — ENDOSCOPIC TRAY TRNSPRT 20.5X16.5X4.1 IN RECYCL SUGAR PULP

## (undated) DEVICE — SYRINGE IRRIG 60ML SFT PLIABLE BLB EZ TO GRP 1 HND USE W/

## (undated) DEVICE — SUTURE VCRL SZ 2-0 L27IN ABSRB UD L26MM CT-2 1/2 CIR J269H

## (undated) DEVICE — TRAP,MUCUS SPECIMEN, 80CC: Brand: MEDLINE

## (undated) DEVICE — NEEDLE HYPO 25GA L1.5IN BLU POLYPR HUB S STL REG BVL STR

## (undated) DEVICE — 6 ML SYRINGE LUER-LOCK TIP: Brand: MONOJECT

## (undated) DEVICE — INTENDED FOR TISSUE SEPARATION, AND OTHER PROCEDURES THAT REQUIRE A SHARP SURGICAL BLADE TO PUNCTURE OR CUT.: Brand: BARD-PARKER ® CARBON RIB-BACK BLADES

## (undated) DEVICE — Z INACTIVE USE 2735373 APPLICATOR FBR LAIN COT WOOD TIP ECONOMICAL

## (undated) DEVICE — CHLORAPREP 26ML ORANGE

## (undated) DEVICE — SYRINGE MED 10ML LUERLOCK TIP W/O SFTY DISP

## (undated) DEVICE — GAUZE,SPONGE,4"X4",16PLY,XRAY,STRL,LF: Brand: MEDLINE

## (undated) DEVICE — AIRLIFE™ OXYGEN TUBING 7 FEET (2.1 M) CRUSH RESISTANT OXYGEN TUBING, VINYL TIPPED: Brand: AIRLIFE™

## (undated) DEVICE — PACK,LAPAROTOMY,NO GOWNS: Brand: MEDLINE

## (undated) DEVICE — COVER US PRB W4XL48IN W O BND AND GEL GEN PURP

## (undated) DEVICE — CANNULA PERF L2IN BLNT TIP 3MM VES CLR RADPQ BODY FEM LUER D

## (undated) DEVICE — GOWN,AURORA,NONREINFORCED,LARGE: Brand: MEDLINE

## (undated) DEVICE — AIRLIFE™ MISTY MAX 10™ NEBULIZER: Brand: AIRLIFE™

## (undated) DEVICE — BANDAGE WND ADH LF FLX CURAD 3/4X3IN

## (undated) DEVICE — GOWN,AURORA,NONRNF,XL,30/CS: Brand: MEDLINE

## (undated) DEVICE — 12" (30.5 CM) ARTERIAL PRESSURE TUBING: Brand: ICU MEDICAL

## (undated) DEVICE — SUTURE MCRYL SZ 4-0 L27IN ABSRB UD L19MM PS-2 1/2 CIR PRIM Y426H